# Patient Record
Sex: MALE | Race: BLACK OR AFRICAN AMERICAN | NOT HISPANIC OR LATINO | URBAN - METROPOLITAN AREA
[De-identification: names, ages, dates, MRNs, and addresses within clinical notes are randomized per-mention and may not be internally consistent; named-entity substitution may affect disease eponyms.]

---

## 2021-01-01 ENCOUNTER — INPATIENT (INPATIENT)
Facility: HOSPITAL | Age: 81
LOS: 8 days | DRG: 377 | End: 2021-06-25
Attending: STUDENT IN AN ORGANIZED HEALTH CARE EDUCATION/TRAINING PROGRAM | Admitting: STUDENT IN AN ORGANIZED HEALTH CARE EDUCATION/TRAINING PROGRAM
Payer: MEDICAID

## 2021-01-01 VITALS
TEMPERATURE: 98 F | HEART RATE: 76 BPM | SYSTOLIC BLOOD PRESSURE: 118 MMHG | RESPIRATION RATE: 35 BRPM | OXYGEN SATURATION: 100 % | WEIGHT: 198.64 LBS | DIASTOLIC BLOOD PRESSURE: 76 MMHG | HEIGHT: 71 IN

## 2021-01-01 DIAGNOSIS — E87.2 ACIDOSIS: ICD-10-CM

## 2021-01-01 DIAGNOSIS — Z87.19 PERSONAL HISTORY OF OTHER DISEASES OF THE DIGESTIVE SYSTEM: Chronic | ICD-10-CM

## 2021-01-01 DIAGNOSIS — Z95.828 PRESENCE OF OTHER VASCULAR IMPLANTS AND GRAFTS: Chronic | ICD-10-CM

## 2021-01-01 DIAGNOSIS — K29.00 ACUTE GASTRITIS WITHOUT BLEEDING: ICD-10-CM

## 2021-01-01 DIAGNOSIS — Z98.890 OTHER SPECIFIED POSTPROCEDURAL STATES: Chronic | ICD-10-CM

## 2021-01-01 DIAGNOSIS — Z51.81 ENCOUNTER FOR THERAPEUTIC DRUG LEVEL MONITORING: ICD-10-CM

## 2021-01-01 DIAGNOSIS — R80.9 PROTEINURIA, UNSPECIFIED: ICD-10-CM

## 2021-01-01 DIAGNOSIS — K92.2 GASTROINTESTINAL HEMORRHAGE, UNSPECIFIED: ICD-10-CM

## 2021-01-01 DIAGNOSIS — N17.9 ACUTE KIDNEY FAILURE, UNSPECIFIED: ICD-10-CM

## 2021-01-01 LAB
ALBUMIN SERPL ELPH-MCNC: 1.3 G/DL — LOW (ref 3.3–5)
ALBUMIN SERPL ELPH-MCNC: 1.6 G/DL — LOW (ref 3.3–5)
ALBUMIN SERPL ELPH-MCNC: 1.6 G/DL — LOW (ref 3.3–5)
ALBUMIN SERPL ELPH-MCNC: 1.7 G/DL — LOW (ref 3.3–5)
ALBUMIN SERPL ELPH-MCNC: 1.8 G/DL — LOW (ref 3.3–5)
ALBUMIN SERPL ELPH-MCNC: 1.9 G/DL — LOW (ref 3.3–5)
ALBUMIN SERPL ELPH-MCNC: 2 G/DL — LOW (ref 3.3–5)
ALBUMIN SERPL ELPH-MCNC: 2.2 G/DL — LOW (ref 3.3–5)
ALP SERPL-CCNC: 129 U/L — HIGH (ref 40–120)
ALP SERPL-CCNC: 176 U/L — HIGH (ref 40–120)
ALP SERPL-CCNC: 183 U/L — HIGH (ref 40–120)
ALP SERPL-CCNC: 35 U/L — LOW (ref 40–120)
ALP SERPL-CCNC: 36 U/L — LOW (ref 40–120)
ALP SERPL-CCNC: 38 U/L — LOW (ref 40–120)
ALP SERPL-CCNC: 40 U/L — SIGNIFICANT CHANGE UP (ref 40–120)
ALP SERPL-CCNC: 40 U/L — SIGNIFICANT CHANGE UP (ref 40–120)
ALP SERPL-CCNC: 43 U/L — SIGNIFICANT CHANGE UP (ref 40–120)
ALP SERPL-CCNC: 44 U/L — SIGNIFICANT CHANGE UP (ref 40–120)
ALP SERPL-CCNC: 47 U/L — SIGNIFICANT CHANGE UP (ref 40–120)
ALP SERPL-CCNC: 49 U/L — SIGNIFICANT CHANGE UP (ref 40–120)
ALP SERPL-CCNC: 50 U/L — SIGNIFICANT CHANGE UP (ref 40–120)
ALP SERPL-CCNC: 52 U/L — SIGNIFICANT CHANGE UP (ref 40–120)
ALP SERPL-CCNC: 62 U/L — SIGNIFICANT CHANGE UP (ref 40–120)
ALP SERPL-CCNC: 68 U/L — SIGNIFICANT CHANGE UP (ref 40–120)
ALP SERPL-CCNC: 75 U/L — SIGNIFICANT CHANGE UP (ref 40–120)
ALP SERPL-CCNC: 89 U/L — SIGNIFICANT CHANGE UP (ref 40–120)
ALT FLD-CCNC: 11 U/L — SIGNIFICANT CHANGE UP (ref 10–45)
ALT FLD-CCNC: 14 U/L — SIGNIFICANT CHANGE UP (ref 10–45)
ALT FLD-CCNC: 247 U/L — HIGH (ref 10–45)
ALT FLD-CCNC: 5 U/L — LOW (ref 10–45)
ALT FLD-CCNC: 6 U/L — LOW (ref 10–45)
ALT FLD-CCNC: 6 U/L — LOW (ref 10–45)
ALT FLD-CCNC: 7 U/L — LOW (ref 10–45)
ALT FLD-CCNC: 7 U/L — LOW (ref 10–45)
ALT FLD-CCNC: 8 U/L — LOW (ref 10–45)
ALT FLD-CCNC: 9 U/L — LOW (ref 10–45)
ANION GAP SERPL CALC-SCNC: 11 MMOL/L — SIGNIFICANT CHANGE UP (ref 5–17)
ANION GAP SERPL CALC-SCNC: 12 MMOL/L — SIGNIFICANT CHANGE UP (ref 5–17)
ANION GAP SERPL CALC-SCNC: 13 MMOL/L — SIGNIFICANT CHANGE UP (ref 5–17)
ANION GAP SERPL CALC-SCNC: 13 MMOL/L — SIGNIFICANT CHANGE UP (ref 5–17)
ANION GAP SERPL CALC-SCNC: 14 MMOL/L — SIGNIFICANT CHANGE UP (ref 5–17)
ANION GAP SERPL CALC-SCNC: 15 MMOL/L — SIGNIFICANT CHANGE UP (ref 5–17)
ANION GAP SERPL CALC-SCNC: 16 MMOL/L — SIGNIFICANT CHANGE UP (ref 5–17)
ANION GAP SERPL CALC-SCNC: 17 MMOL/L — SIGNIFICANT CHANGE UP (ref 5–17)
ANION GAP SERPL CALC-SCNC: 18 MMOL/L — HIGH (ref 5–17)
ANION GAP SERPL CALC-SCNC: 19 MMOL/L — HIGH (ref 5–17)
ANION GAP SERPL CALC-SCNC: 19 MMOL/L — HIGH (ref 5–17)
ANION GAP SERPL CALC-SCNC: 21 MMOL/L — HIGH (ref 5–17)
APPEARANCE UR: ABNORMAL
APTT BLD: 30.9 SEC — SIGNIFICANT CHANGE UP (ref 27.5–35.5)
APTT BLD: 31.1 SEC — SIGNIFICANT CHANGE UP (ref 27.5–35.5)
APTT BLD: 34.4 SEC — SIGNIFICANT CHANGE UP (ref 27.5–35.5)
APTT BLD: 35.3 SEC — SIGNIFICANT CHANGE UP (ref 27.5–35.5)
APTT BLD: 35.9 SEC — HIGH (ref 27.5–35.5)
APTT BLD: 36.3 SEC — HIGH (ref 27.5–35.5)
APTT BLD: 37.1 SEC — HIGH (ref 27.5–35.5)
APTT BLD: 39.2 SEC — HIGH (ref 27.5–35.5)
APTT BLD: 40 SEC — HIGH (ref 27.5–35.5)
APTT BLD: 41.3 SEC — HIGH (ref 27.5–35.5)
APTT BLD: 42.2 SEC — HIGH (ref 27.5–35.5)
AST SERPL-CCNC: 1038 U/L — HIGH (ref 10–40)
AST SERPL-CCNC: 13 U/L — SIGNIFICANT CHANGE UP (ref 10–40)
AST SERPL-CCNC: 15 U/L — SIGNIFICANT CHANGE UP (ref 10–40)
AST SERPL-CCNC: 16 U/L — SIGNIFICANT CHANGE UP (ref 10–40)
AST SERPL-CCNC: 17 U/L — SIGNIFICANT CHANGE UP (ref 10–40)
AST SERPL-CCNC: 17 U/L — SIGNIFICANT CHANGE UP (ref 10–40)
AST SERPL-CCNC: 18 U/L — SIGNIFICANT CHANGE UP (ref 10–40)
AST SERPL-CCNC: 20 U/L — SIGNIFICANT CHANGE UP (ref 10–40)
AST SERPL-CCNC: 22 U/L — SIGNIFICANT CHANGE UP (ref 10–40)
AST SERPL-CCNC: 22 U/L — SIGNIFICANT CHANGE UP (ref 10–40)
AST SERPL-CCNC: 23 U/L — SIGNIFICANT CHANGE UP (ref 10–40)
AST SERPL-CCNC: 25 U/L — SIGNIFICANT CHANGE UP (ref 10–40)
AST SERPL-CCNC: 25 U/L — SIGNIFICANT CHANGE UP (ref 10–40)
AST SERPL-CCNC: 26 U/L — SIGNIFICANT CHANGE UP (ref 10–40)
AST SERPL-CCNC: 26 U/L — SIGNIFICANT CHANGE UP (ref 10–40)
AST SERPL-CCNC: 31 U/L — SIGNIFICANT CHANGE UP (ref 10–40)
BACTERIA # UR AUTO: NEGATIVE — SIGNIFICANT CHANGE UP
BASE EXCESS BLDV CALC-SCNC: -11.3 MMOL/L — LOW (ref -2–2)
BASOPHILS # BLD AUTO: 0.01 K/UL — SIGNIFICANT CHANGE UP (ref 0–0.2)
BASOPHILS # BLD AUTO: 0.02 K/UL — SIGNIFICANT CHANGE UP (ref 0–0.2)
BASOPHILS NFR BLD AUTO: 0.1 % — SIGNIFICANT CHANGE UP (ref 0–2)
BASOPHILS NFR BLD AUTO: 0.2 % — SIGNIFICANT CHANGE UP (ref 0–2)
BILIRUB SERPL-MCNC: 0.2 MG/DL — SIGNIFICANT CHANGE UP (ref 0.2–1.2)
BILIRUB SERPL-MCNC: 0.3 MG/DL — SIGNIFICANT CHANGE UP (ref 0.2–1.2)
BILIRUB SERPL-MCNC: 0.4 MG/DL — SIGNIFICANT CHANGE UP (ref 0.2–1.2)
BILIRUB SERPL-MCNC: 0.5 MG/DL — SIGNIFICANT CHANGE UP (ref 0.2–1.2)
BILIRUB SERPL-MCNC: 0.6 MG/DL — SIGNIFICANT CHANGE UP (ref 0.2–1.2)
BILIRUB SERPL-MCNC: 0.6 MG/DL — SIGNIFICANT CHANGE UP (ref 0.2–1.2)
BILIRUB SERPL-MCNC: 0.7 MG/DL — SIGNIFICANT CHANGE UP (ref 0.2–1.2)
BILIRUB SERPL-MCNC: 2 MG/DL — HIGH (ref 0.2–1.2)
BILIRUB UR-MCNC: NEGATIVE — SIGNIFICANT CHANGE UP
BLD GP AB SCN SERPL QL: NEGATIVE — SIGNIFICANT CHANGE UP
BUN SERPL-MCNC: 17 MG/DL — SIGNIFICANT CHANGE UP (ref 7–23)
BUN SERPL-MCNC: 18 MG/DL — SIGNIFICANT CHANGE UP (ref 7–23)
BUN SERPL-MCNC: 20 MG/DL — SIGNIFICANT CHANGE UP (ref 7–23)
BUN SERPL-MCNC: 22 MG/DL — SIGNIFICANT CHANGE UP (ref 7–23)
BUN SERPL-MCNC: 24 MG/DL — HIGH (ref 7–23)
BUN SERPL-MCNC: 27 MG/DL — HIGH (ref 7–23)
BUN SERPL-MCNC: 28 MG/DL — HIGH (ref 7–23)
BUN SERPL-MCNC: 28 MG/DL — HIGH (ref 7–23)
BUN SERPL-MCNC: 30 MG/DL — HIGH (ref 7–23)
BUN SERPL-MCNC: 31 MG/DL — HIGH (ref 7–23)
BUN SERPL-MCNC: 37 MG/DL — HIGH (ref 7–23)
BUN SERPL-MCNC: 41 MG/DL — HIGH (ref 7–23)
BUN SERPL-MCNC: 46 MG/DL — HIGH (ref 7–23)
BUN SERPL-MCNC: 48 MG/DL — HIGH (ref 7–23)
BUN SERPL-MCNC: 49 MG/DL — HIGH (ref 7–23)
BUN SERPL-MCNC: 50 MG/DL — HIGH (ref 7–23)
BUN SERPL-MCNC: 50 MG/DL — HIGH (ref 7–23)
BUN SERPL-MCNC: 52 MG/DL — HIGH (ref 7–23)
BUN SERPL-MCNC: 52 MG/DL — HIGH (ref 7–23)
BUN SERPL-MCNC: 53 MG/DL — HIGH (ref 7–23)
BUN SERPL-MCNC: 55 MG/DL — HIGH (ref 7–23)
BUN SERPL-MCNC: 55 MG/DL — HIGH (ref 7–23)
BUN SERPL-MCNC: 56 MG/DL — HIGH (ref 7–23)
BUN SERPL-MCNC: 57 MG/DL — HIGH (ref 7–23)
BUN SERPL-MCNC: 59 MG/DL — HIGH (ref 7–23)
BUN SERPL-MCNC: 59 MG/DL — HIGH (ref 7–23)
BUN SERPL-MCNC: 60 MG/DL — HIGH (ref 7–23)
BUN SERPL-MCNC: 60 MG/DL — HIGH (ref 7–23)
BUN SERPL-MCNC: 62 MG/DL — HIGH (ref 7–23)
BUN SERPL-MCNC: 63 MG/DL — HIGH (ref 7–23)
BUN SERPL-MCNC: 63 MG/DL — HIGH (ref 7–23)
BUN SERPL-MCNC: 65 MG/DL — HIGH (ref 7–23)
BUN SERPL-MCNC: 67 MG/DL — HIGH (ref 7–23)
BUN SERPL-MCNC: 67 MG/DL — HIGH (ref 7–23)
CA-I SERPL-SCNC: 1.08 MMOL/L — LOW (ref 1.12–1.3)
CALCIUM SERPL-MCNC: 4.8 MG/DL — CRITICAL LOW (ref 8.4–10.5)
CALCIUM SERPL-MCNC: 6.8 MG/DL — LOW (ref 8.4–10.5)
CALCIUM SERPL-MCNC: 6.8 MG/DL — LOW (ref 8.4–10.5)
CALCIUM SERPL-MCNC: 6.9 MG/DL — LOW (ref 8.4–10.5)
CALCIUM SERPL-MCNC: 6.9 MG/DL — LOW (ref 8.4–10.5)
CALCIUM SERPL-MCNC: 7 MG/DL — LOW (ref 8.4–10.5)
CALCIUM SERPL-MCNC: 7.1 MG/DL — LOW (ref 8.4–10.5)
CALCIUM SERPL-MCNC: 7.2 MG/DL — LOW (ref 8.4–10.5)
CALCIUM SERPL-MCNC: 7.3 MG/DL — LOW (ref 8.4–10.5)
CALCIUM SERPL-MCNC: 7.4 MG/DL — LOW (ref 8.4–10.5)
CALCIUM SERPL-MCNC: 7.5 MG/DL — LOW (ref 8.4–10.5)
CALCIUM SERPL-MCNC: 7.5 MG/DL — LOW (ref 8.4–10.5)
CALCIUM SERPL-MCNC: 7.9 MG/DL — LOW (ref 8.4–10.5)
CHLORIDE BLDV-SCNC: >120 MMOL/L — HIGH (ref 96–108)
CHLORIDE SERPL-SCNC: 105 MMOL/L — SIGNIFICANT CHANGE UP (ref 96–108)
CHLORIDE SERPL-SCNC: 107 MMOL/L — SIGNIFICANT CHANGE UP (ref 96–108)
CHLORIDE SERPL-SCNC: 108 MMOL/L — SIGNIFICANT CHANGE UP (ref 96–108)
CHLORIDE SERPL-SCNC: 109 MMOL/L — HIGH (ref 96–108)
CHLORIDE SERPL-SCNC: 109 MMOL/L — HIGH (ref 96–108)
CHLORIDE SERPL-SCNC: 110 MMOL/L — HIGH (ref 96–108)
CHLORIDE SERPL-SCNC: 110 MMOL/L — HIGH (ref 96–108)
CHLORIDE SERPL-SCNC: 111 MMOL/L — HIGH (ref 96–108)
CHLORIDE SERPL-SCNC: 112 MMOL/L — HIGH (ref 96–108)
CHLORIDE SERPL-SCNC: 112 MMOL/L — HIGH (ref 96–108)
CHLORIDE SERPL-SCNC: 113 MMOL/L — HIGH (ref 96–108)
CHLORIDE SERPL-SCNC: 115 MMOL/L — HIGH (ref 96–108)
CHLORIDE SERPL-SCNC: 116 MMOL/L — HIGH (ref 96–108)
CHLORIDE SERPL-SCNC: 117 MMOL/L — HIGH (ref 96–108)
CHLORIDE SERPL-SCNC: 118 MMOL/L — HIGH (ref 96–108)
CHLORIDE SERPL-SCNC: 118 MMOL/L — HIGH (ref 96–108)
CHLORIDE SERPL-SCNC: 119 MMOL/L — HIGH (ref 96–108)
CHLORIDE SERPL-SCNC: 120 MMOL/L — HIGH (ref 96–108)
CHLORIDE SERPL-SCNC: 121 MMOL/L — HIGH (ref 96–108)
CHLORIDE SERPL-SCNC: 129 MMOL/L — HIGH (ref 96–108)
CO2 BLDV-SCNC: 17 MMOL/L — LOW (ref 22–30)
CO2 SERPL-SCNC: 10 MMOL/L — CRITICAL LOW (ref 22–31)
CO2 SERPL-SCNC: 12 MMOL/L — LOW (ref 22–31)
CO2 SERPL-SCNC: 13 MMOL/L — LOW (ref 22–31)
CO2 SERPL-SCNC: 14 MMOL/L — LOW (ref 22–31)
CO2 SERPL-SCNC: 15 MMOL/L — LOW (ref 22–31)
CO2 SERPL-SCNC: 16 MMOL/L — LOW (ref 22–31)
CO2 SERPL-SCNC: 16 MMOL/L — LOW (ref 22–31)
CO2 SERPL-SCNC: 17 MMOL/L — LOW (ref 22–31)
CO2 SERPL-SCNC: 18 MMOL/L — LOW (ref 22–31)
CO2 SERPL-SCNC: 19 MMOL/L — LOW (ref 22–31)
CO2 SERPL-SCNC: 19 MMOL/L — LOW (ref 22–31)
COLOR SPEC: YELLOW — SIGNIFICANT CHANGE UP
COMMENT - URINE: SIGNIFICANT CHANGE UP
COMMENT - URINE: SIGNIFICANT CHANGE UP
COVID-19 SPIKE DOMAIN AB INTERP: POSITIVE
COVID-19 SPIKE DOMAIN ANTIBODY RESULT: 69.7 U/ML — HIGH
CREAT ?TM UR-MCNC: 55 MG/DL — SIGNIFICANT CHANGE UP
CREAT SERPL-MCNC: 1.4 MG/DL — HIGH (ref 0.5–1.3)
CREAT SERPL-MCNC: 1.44 MG/DL — HIGH (ref 0.5–1.3)
CREAT SERPL-MCNC: 1.45 MG/DL — HIGH (ref 0.5–1.3)
CREAT SERPL-MCNC: 1.49 MG/DL — HIGH (ref 0.5–1.3)
CREAT SERPL-MCNC: 1.65 MG/DL — HIGH (ref 0.5–1.3)
CREAT SERPL-MCNC: 1.75 MG/DL — HIGH (ref 0.5–1.3)
CREAT SERPL-MCNC: 1.84 MG/DL — HIGH (ref 0.5–1.3)
CREAT SERPL-MCNC: 1.86 MG/DL — HIGH (ref 0.5–1.3)
CREAT SERPL-MCNC: 1.92 MG/DL — HIGH (ref 0.5–1.3)
CREAT SERPL-MCNC: 1.96 MG/DL — HIGH (ref 0.5–1.3)
CREAT SERPL-MCNC: 2.27 MG/DL — HIGH (ref 0.5–1.3)
CREAT SERPL-MCNC: 2.61 MG/DL — HIGH (ref 0.5–1.3)
CREAT SERPL-MCNC: 2.65 MG/DL — HIGH (ref 0.5–1.3)
CREAT SERPL-MCNC: 2.71 MG/DL — HIGH (ref 0.5–1.3)
CREAT SERPL-MCNC: 2.72 MG/DL — HIGH (ref 0.5–1.3)
CREAT SERPL-MCNC: 2.77 MG/DL — HIGH (ref 0.5–1.3)
CREAT SERPL-MCNC: 2.79 MG/DL — HIGH (ref 0.5–1.3)
CREAT SERPL-MCNC: 2.8 MG/DL — HIGH (ref 0.5–1.3)
CREAT SERPL-MCNC: 2.82 MG/DL — HIGH (ref 0.5–1.3)
CREAT SERPL-MCNC: 2.92 MG/DL — HIGH (ref 0.5–1.3)
CREAT SERPL-MCNC: 2.93 MG/DL — HIGH (ref 0.5–1.3)
CREAT SERPL-MCNC: 3.01 MG/DL — HIGH (ref 0.5–1.3)
CREAT SERPL-MCNC: 3.17 MG/DL — HIGH (ref 0.5–1.3)
CREAT SERPL-MCNC: 3.23 MG/DL — HIGH (ref 0.5–1.3)
CREAT SERPL-MCNC: 3.29 MG/DL — HIGH (ref 0.5–1.3)
CREAT SERPL-MCNC: 3.33 MG/DL — HIGH (ref 0.5–1.3)
CREAT SERPL-MCNC: 3.36 MG/DL — HIGH (ref 0.5–1.3)
CREAT SERPL-MCNC: 3.5 MG/DL — HIGH (ref 0.5–1.3)
CREAT SERPL-MCNC: 3.51 MG/DL — HIGH (ref 0.5–1.3)
CREAT SERPL-MCNC: 3.58 MG/DL — HIGH (ref 0.5–1.3)
CREAT SERPL-MCNC: 3.85 MG/DL — HIGH (ref 0.5–1.3)
CREAT SERPL-MCNC: 3.86 MG/DL — HIGH (ref 0.5–1.3)
CREAT SERPL-MCNC: 3.98 MG/DL — HIGH (ref 0.5–1.3)
CREAT SERPL-MCNC: 4.07 MG/DL — HIGH (ref 0.5–1.3)
CULTURE RESULTS: SIGNIFICANT CHANGE UP
DIFF PNL FLD: ABNORMAL
EOSINOPHIL # BLD AUTO: 0.01 K/UL — SIGNIFICANT CHANGE UP (ref 0–0.5)
EOSINOPHIL # BLD AUTO: 0.1 K/UL — SIGNIFICANT CHANGE UP (ref 0–0.5)
EOSINOPHIL NFR BLD AUTO: 0 % — SIGNIFICANT CHANGE UP (ref 0–6)
EOSINOPHIL NFR BLD AUTO: 1.8 % — SIGNIFICANT CHANGE UP (ref 0–6)
EPI CELLS # UR: 6 — HIGH
FIBRINOGEN PPP-MCNC: 281 MG/DL — LOW (ref 290–520)
GAS PNL BLDA: SIGNIFICANT CHANGE UP
GAS PNL BLDV: 143 MMOL/L — SIGNIFICANT CHANGE UP (ref 135–145)
GAS PNL BLDV: SIGNIFICANT CHANGE UP
GASTRIN SERPL-MCNC: 54 PG/ML — SIGNIFICANT CHANGE UP (ref 0–115)
GLUCOSE BLDC GLUCOMTR-MCNC: 100 MG/DL — HIGH (ref 70–99)
GLUCOSE BLDC GLUCOMTR-MCNC: 101 MG/DL — HIGH (ref 70–99)
GLUCOSE BLDC GLUCOMTR-MCNC: 101 MG/DL — HIGH (ref 70–99)
GLUCOSE BLDC GLUCOMTR-MCNC: 109 MG/DL — HIGH (ref 70–99)
GLUCOSE BLDC GLUCOMTR-MCNC: 113 MG/DL — HIGH (ref 70–99)
GLUCOSE BLDC GLUCOMTR-MCNC: 115 MG/DL — HIGH (ref 70–99)
GLUCOSE BLDC GLUCOMTR-MCNC: 123 MG/DL — HIGH (ref 70–99)
GLUCOSE BLDC GLUCOMTR-MCNC: 129 MG/DL — HIGH (ref 70–99)
GLUCOSE BLDC GLUCOMTR-MCNC: 133 MG/DL — HIGH (ref 70–99)
GLUCOSE BLDC GLUCOMTR-MCNC: 145 MG/DL — HIGH (ref 70–99)
GLUCOSE BLDC GLUCOMTR-MCNC: 154 MG/DL — HIGH (ref 70–99)
GLUCOSE BLDC GLUCOMTR-MCNC: 156 MG/DL — HIGH (ref 70–99)
GLUCOSE BLDC GLUCOMTR-MCNC: 158 MG/DL — HIGH (ref 70–99)
GLUCOSE BLDC GLUCOMTR-MCNC: 16 MG/DL — CRITICAL LOW (ref 70–99)
GLUCOSE BLDC GLUCOMTR-MCNC: 163 MG/DL — HIGH (ref 70–99)
GLUCOSE BLDC GLUCOMTR-MCNC: 165 MG/DL — HIGH (ref 70–99)
GLUCOSE BLDC GLUCOMTR-MCNC: 167 MG/DL — HIGH (ref 70–99)
GLUCOSE BLDC GLUCOMTR-MCNC: 22 MG/DL — CRITICAL LOW (ref 70–99)
GLUCOSE BLDC GLUCOMTR-MCNC: 45 MG/DL — CRITICAL LOW (ref 70–99)
GLUCOSE BLDC GLUCOMTR-MCNC: 57 MG/DL — LOW (ref 70–99)
GLUCOSE BLDC GLUCOMTR-MCNC: 80 MG/DL — SIGNIFICANT CHANGE UP (ref 70–99)
GLUCOSE BLDC GLUCOMTR-MCNC: 82 MG/DL — SIGNIFICANT CHANGE UP (ref 70–99)
GLUCOSE BLDC GLUCOMTR-MCNC: 87 MG/DL — SIGNIFICANT CHANGE UP (ref 70–99)
GLUCOSE BLDC GLUCOMTR-MCNC: 88 MG/DL — SIGNIFICANT CHANGE UP (ref 70–99)
GLUCOSE BLDC GLUCOMTR-MCNC: 88 MG/DL — SIGNIFICANT CHANGE UP (ref 70–99)
GLUCOSE BLDC GLUCOMTR-MCNC: 90 MG/DL — SIGNIFICANT CHANGE UP (ref 70–99)
GLUCOSE BLDC GLUCOMTR-MCNC: 91 MG/DL — SIGNIFICANT CHANGE UP (ref 70–99)
GLUCOSE BLDC GLUCOMTR-MCNC: 91 MG/DL — SIGNIFICANT CHANGE UP (ref 70–99)
GLUCOSE BLDC GLUCOMTR-MCNC: 93 MG/DL — SIGNIFICANT CHANGE UP (ref 70–99)
GLUCOSE BLDC GLUCOMTR-MCNC: 96 MG/DL — SIGNIFICANT CHANGE UP (ref 70–99)
GLUCOSE BLDC GLUCOMTR-MCNC: 97 MG/DL — SIGNIFICANT CHANGE UP (ref 70–99)
GLUCOSE BLDC GLUCOMTR-MCNC: 98 MG/DL — SIGNIFICANT CHANGE UP (ref 70–99)
GLUCOSE BLDC GLUCOMTR-MCNC: 98 MG/DL — SIGNIFICANT CHANGE UP (ref 70–99)
GLUCOSE BLDV-MCNC: 125 MG/DL — HIGH (ref 70–99)
GLUCOSE SERPL-MCNC: 102 MG/DL — HIGH (ref 70–99)
GLUCOSE SERPL-MCNC: 105 MG/DL — HIGH (ref 70–99)
GLUCOSE SERPL-MCNC: 111 MG/DL — HIGH (ref 70–99)
GLUCOSE SERPL-MCNC: 113 MG/DL — HIGH (ref 70–99)
GLUCOSE SERPL-MCNC: 115 MG/DL — HIGH (ref 70–99)
GLUCOSE SERPL-MCNC: 127 MG/DL — HIGH (ref 70–99)
GLUCOSE SERPL-MCNC: 128 MG/DL — HIGH (ref 70–99)
GLUCOSE SERPL-MCNC: 130 MG/DL — HIGH (ref 70–99)
GLUCOSE SERPL-MCNC: 130 MG/DL — HIGH (ref 70–99)
GLUCOSE SERPL-MCNC: 135 MG/DL — HIGH (ref 70–99)
GLUCOSE SERPL-MCNC: 143 MG/DL — HIGH (ref 70–99)
GLUCOSE SERPL-MCNC: 146 MG/DL — HIGH (ref 70–99)
GLUCOSE SERPL-MCNC: 146 MG/DL — HIGH (ref 70–99)
GLUCOSE SERPL-MCNC: 149 MG/DL — HIGH (ref 70–99)
GLUCOSE SERPL-MCNC: 150 MG/DL — HIGH (ref 70–99)
GLUCOSE SERPL-MCNC: 151 MG/DL — HIGH (ref 70–99)
GLUCOSE SERPL-MCNC: 163 MG/DL — HIGH (ref 70–99)
GLUCOSE SERPL-MCNC: 166 MG/DL — HIGH (ref 70–99)
GLUCOSE SERPL-MCNC: 172 MG/DL — HIGH (ref 70–99)
GLUCOSE SERPL-MCNC: 180 MG/DL — HIGH (ref 70–99)
GLUCOSE SERPL-MCNC: 181 MG/DL — HIGH (ref 70–99)
GLUCOSE SERPL-MCNC: 193 MG/DL — HIGH (ref 70–99)
GLUCOSE SERPL-MCNC: 193 MG/DL — HIGH (ref 70–99)
GLUCOSE SERPL-MCNC: 217 MG/DL — HIGH (ref 70–99)
GLUCOSE SERPL-MCNC: 234 MG/DL — HIGH (ref 70–99)
GLUCOSE SERPL-MCNC: 78 MG/DL — SIGNIFICANT CHANGE UP (ref 70–99)
GLUCOSE SERPL-MCNC: 86 MG/DL — SIGNIFICANT CHANGE UP (ref 70–99)
GLUCOSE SERPL-MCNC: 89 MG/DL — SIGNIFICANT CHANGE UP (ref 70–99)
GLUCOSE SERPL-MCNC: 93 MG/DL — SIGNIFICANT CHANGE UP (ref 70–99)
GLUCOSE SERPL-MCNC: 93 MG/DL — SIGNIFICANT CHANGE UP (ref 70–99)
GLUCOSE SERPL-MCNC: 95 MG/DL — SIGNIFICANT CHANGE UP (ref 70–99)
GLUCOSE SERPL-MCNC: 96 MG/DL — SIGNIFICANT CHANGE UP (ref 70–99)
GLUCOSE SERPL-MCNC: 98 MG/DL — SIGNIFICANT CHANGE UP (ref 70–99)
GLUCOSE SERPL-MCNC: 99 MG/DL — SIGNIFICANT CHANGE UP (ref 70–99)
GLUCOSE UR QL: NEGATIVE — SIGNIFICANT CHANGE UP
GRAM STN FLD: SIGNIFICANT CHANGE UP
H PYLORI AB SER-ACNC: 9 UNITS — SIGNIFICANT CHANGE UP
HCO3 BLDV-SCNC: 15 MMOL/L — LOW (ref 21–29)
HCT VFR BLD CALC: 19.7 % — CRITICAL LOW (ref 39–50)
HCT VFR BLD CALC: 20.6 % — CRITICAL LOW (ref 39–50)
HCT VFR BLD CALC: 20.8 % — CRITICAL LOW (ref 39–50)
HCT VFR BLD CALC: 20.8 % — CRITICAL LOW (ref 39–50)
HCT VFR BLD CALC: 21 % — CRITICAL LOW (ref 39–50)
HCT VFR BLD CALC: 21.4 % — LOW (ref 39–50)
HCT VFR BLD CALC: 21.5 % — LOW (ref 39–50)
HCT VFR BLD CALC: 21.6 % — LOW (ref 39–50)
HCT VFR BLD CALC: 21.8 % — LOW (ref 39–50)
HCT VFR BLD CALC: 21.9 % — LOW (ref 39–50)
HCT VFR BLD CALC: 22.1 % — LOW (ref 39–50)
HCT VFR BLD CALC: 22.1 % — LOW (ref 39–50)
HCT VFR BLD CALC: 22.3 % — LOW (ref 39–50)
HCT VFR BLD CALC: 22.5 % — LOW (ref 39–50)
HCT VFR BLD CALC: 22.5 % — LOW (ref 39–50)
HCT VFR BLD CALC: 22.7 % — LOW (ref 39–50)
HCT VFR BLD CALC: 22.9 % — LOW (ref 39–50)
HCT VFR BLD CALC: 23.2 % — LOW (ref 39–50)
HCT VFR BLD CALC: 23.5 % — LOW (ref 39–50)
HCT VFR BLD CALC: 23.7 % — LOW (ref 39–50)
HCT VFR BLD CALC: 23.7 % — LOW (ref 39–50)
HCT VFR BLD CALC: 23.8 % — LOW (ref 39–50)
HCT VFR BLD CALC: 24.1 % — LOW (ref 39–50)
HCT VFR BLD CALC: 24.3 % — LOW (ref 39–50)
HCT VFR BLD CALC: 24.4 % — LOW (ref 39–50)
HCT VFR BLD CALC: 25.3 % — LOW (ref 39–50)
HCT VFR BLD CALC: 25.6 % — LOW (ref 39–50)
HCT VFR BLD CALC: 25.7 % — LOW (ref 39–50)
HCT VFR BLD CALC: 25.9 % — LOW (ref 39–50)
HCT VFR BLD CALC: 26.5 % — LOW (ref 39–50)
HCT VFR BLD CALC: 26.7 % — LOW (ref 39–50)
HCT VFR BLD CALC: 26.8 % — LOW (ref 39–50)
HCT VFR BLD CALC: 26.9 % — LOW (ref 39–50)
HCT VFR BLD CALC: 27 % — LOW (ref 39–50)
HCT VFR BLD CALC: 28.4 % — LOW (ref 39–50)
HCT VFR BLD CALC: 29.3 % — LOW (ref 39–50)
HCT VFR BLD CALC: 30 % — LOW (ref 39–50)
HCT VFR BLD CALC: 31.3 % — LOW (ref 39–50)
HCT VFR BLDA CALC: 26 % — LOW (ref 39–50)
HEPARINASE TEG R TIME: 4.5 MIN — SIGNIFICANT CHANGE UP (ref 4.3–8.3)
HGB BLD CALC-MCNC: 8.2 G/DL — LOW (ref 13–17)
HGB BLD-MCNC: 10 G/DL — LOW (ref 13–17)
HGB BLD-MCNC: 6.6 G/DL — CRITICAL LOW (ref 13–17)
HGB BLD-MCNC: 6.7 G/DL — CRITICAL LOW (ref 13–17)
HGB BLD-MCNC: 6.9 G/DL — CRITICAL LOW (ref 13–17)
HGB BLD-MCNC: 7 G/DL — CRITICAL LOW (ref 13–17)
HGB BLD-MCNC: 7.1 G/DL — LOW (ref 13–17)
HGB BLD-MCNC: 7.1 G/DL — LOW (ref 13–17)
HGB BLD-MCNC: 7.2 G/DL — LOW (ref 13–17)
HGB BLD-MCNC: 7.3 G/DL — LOW (ref 13–17)
HGB BLD-MCNC: 7.4 G/DL — LOW (ref 13–17)
HGB BLD-MCNC: 7.5 G/DL — LOW (ref 13–17)
HGB BLD-MCNC: 7.5 G/DL — LOW (ref 13–17)
HGB BLD-MCNC: 7.6 G/DL — LOW (ref 13–17)
HGB BLD-MCNC: 7.7 G/DL — LOW (ref 13–17)
HGB BLD-MCNC: 7.7 G/DL — LOW (ref 13–17)
HGB BLD-MCNC: 7.8 G/DL — LOW (ref 13–17)
HGB BLD-MCNC: 7.9 G/DL — LOW (ref 13–17)
HGB BLD-MCNC: 8 G/DL — LOW (ref 13–17)
HGB BLD-MCNC: 8 G/DL — LOW (ref 13–17)
HGB BLD-MCNC: 8.2 G/DL — LOW (ref 13–17)
HGB BLD-MCNC: 8.2 G/DL — LOW (ref 13–17)
HGB BLD-MCNC: 8.4 G/DL — LOW (ref 13–17)
HGB BLD-MCNC: 8.4 G/DL — LOW (ref 13–17)
HGB BLD-MCNC: 8.5 G/DL — LOW (ref 13–17)
HGB BLD-MCNC: 8.5 G/DL — LOW (ref 13–17)
HGB BLD-MCNC: 8.8 G/DL — LOW (ref 13–17)
HGB BLD-MCNC: 9 G/DL — LOW (ref 13–17)
HGB BLD-MCNC: 9.3 G/DL — LOW (ref 13–17)
HGB BLD-MCNC: 9.5 G/DL — LOW (ref 13–17)
HGB BLD-MCNC: 9.7 G/DL — LOW (ref 13–17)
HYALINE CASTS # UR AUTO: 11 /LPF — HIGH (ref 0–7)
IMM GRANULOCYTES NFR BLD AUTO: 0.7 % — SIGNIFICANT CHANGE UP (ref 0–1.5)
IMM GRANULOCYTES NFR BLD AUTO: 0.8 % — SIGNIFICANT CHANGE UP (ref 0–1.5)
INR BLD: 1.14 RATIO — SIGNIFICANT CHANGE UP (ref 0.88–1.16)
INR BLD: 1.23 RATIO — HIGH (ref 0.88–1.16)
INR BLD: 1.31 RATIO — HIGH (ref 0.88–1.16)
INR BLD: 1.31 RATIO — HIGH (ref 0.88–1.16)
INR BLD: 1.33 RATIO — HIGH (ref 0.88–1.16)
INR BLD: 1.36 RATIO — HIGH (ref 0.88–1.16)
INR BLD: 1.38 RATIO — HIGH (ref 0.88–1.16)
INR BLD: 1.39 RATIO — HIGH (ref 0.88–1.16)
INR BLD: 1.41 RATIO — HIGH (ref 0.88–1.16)
INR BLD: 1.43 RATIO — HIGH (ref 0.88–1.16)
INR BLD: 1.94 RATIO — HIGH (ref 0.88–1.16)
KETONES UR-MCNC: NEGATIVE — SIGNIFICANT CHANGE UP
LACTATE BLDV-MCNC: 0.8 MMOL/L — SIGNIFICANT CHANGE UP (ref 0.7–2)
LEUKOCYTE ESTERASE UR-ACNC: ABNORMAL
LYMPHOCYTES # BLD AUTO: 0.59 K/UL — LOW (ref 1–3.3)
LYMPHOCYTES # BLD AUTO: 0.82 K/UL — LOW (ref 1–3.3)
LYMPHOCYTES # BLD AUTO: 15.1 % — SIGNIFICANT CHANGE UP (ref 13–44)
LYMPHOCYTES # BLD AUTO: 2.7 % — LOW (ref 13–44)
MAGNESIUM SERPL-MCNC: 1.2 MG/DL — LOW (ref 1.6–2.6)
MAGNESIUM SERPL-MCNC: 1.6 MG/DL — SIGNIFICANT CHANGE UP (ref 1.6–2.6)
MAGNESIUM SERPL-MCNC: 1.7 MG/DL — SIGNIFICANT CHANGE UP (ref 1.6–2.6)
MAGNESIUM SERPL-MCNC: 1.8 MG/DL — SIGNIFICANT CHANGE UP (ref 1.6–2.6)
MAGNESIUM SERPL-MCNC: 1.9 MG/DL — SIGNIFICANT CHANGE UP (ref 1.6–2.6)
MAGNESIUM SERPL-MCNC: 2 MG/DL — SIGNIFICANT CHANGE UP (ref 1.6–2.6)
MAGNESIUM SERPL-MCNC: 2.1 MG/DL — SIGNIFICANT CHANGE UP (ref 1.6–2.6)
MAGNESIUM SERPL-MCNC: 2.1 MG/DL — SIGNIFICANT CHANGE UP (ref 1.6–2.6)
MAGNESIUM SERPL-MCNC: 2.2 MG/DL — SIGNIFICANT CHANGE UP (ref 1.6–2.6)
MAGNESIUM SERPL-MCNC: 2.3 MG/DL — SIGNIFICANT CHANGE UP (ref 1.6–2.6)
MCHC RBC-ENTMCNC: 27 PG — SIGNIFICANT CHANGE UP (ref 27–34)
MCHC RBC-ENTMCNC: 27 PG — SIGNIFICANT CHANGE UP (ref 27–34)
MCHC RBC-ENTMCNC: 27.1 PG — SIGNIFICANT CHANGE UP (ref 27–34)
MCHC RBC-ENTMCNC: 27.4 PG — SIGNIFICANT CHANGE UP (ref 27–34)
MCHC RBC-ENTMCNC: 27.4 PG — SIGNIFICANT CHANGE UP (ref 27–34)
MCHC RBC-ENTMCNC: 27.7 PG — SIGNIFICANT CHANGE UP (ref 27–34)
MCHC RBC-ENTMCNC: 28 PG — SIGNIFICANT CHANGE UP (ref 27–34)
MCHC RBC-ENTMCNC: 28.8 PG — SIGNIFICANT CHANGE UP (ref 27–34)
MCHC RBC-ENTMCNC: 28.9 PG — SIGNIFICANT CHANGE UP (ref 27–34)
MCHC RBC-ENTMCNC: 29.1 PG — SIGNIFICANT CHANGE UP (ref 27–34)
MCHC RBC-ENTMCNC: 29.2 PG — SIGNIFICANT CHANGE UP (ref 27–34)
MCHC RBC-ENTMCNC: 29.3 PG — SIGNIFICANT CHANGE UP (ref 27–34)
MCHC RBC-ENTMCNC: 29.4 PG — SIGNIFICANT CHANGE UP (ref 27–34)
MCHC RBC-ENTMCNC: 29.5 PG — SIGNIFICANT CHANGE UP (ref 27–34)
MCHC RBC-ENTMCNC: 29.6 PG — SIGNIFICANT CHANGE UP (ref 27–34)
MCHC RBC-ENTMCNC: 29.7 PG — SIGNIFICANT CHANGE UP (ref 27–34)
MCHC RBC-ENTMCNC: 29.7 PG — SIGNIFICANT CHANGE UP (ref 27–34)
MCHC RBC-ENTMCNC: 29.8 PG — SIGNIFICANT CHANGE UP (ref 27–34)
MCHC RBC-ENTMCNC: 30 PG — SIGNIFICANT CHANGE UP (ref 27–34)
MCHC RBC-ENTMCNC: 30.1 PG — SIGNIFICANT CHANGE UP (ref 27–34)
MCHC RBC-ENTMCNC: 30.2 PG — SIGNIFICANT CHANGE UP (ref 27–34)
MCHC RBC-ENTMCNC: 30.2 PG — SIGNIFICANT CHANGE UP (ref 27–34)
MCHC RBC-ENTMCNC: 30.5 PG — SIGNIFICANT CHANGE UP (ref 27–34)
MCHC RBC-ENTMCNC: 30.5 PG — SIGNIFICANT CHANGE UP (ref 27–34)
MCHC RBC-ENTMCNC: 30.6 PG — SIGNIFICANT CHANGE UP (ref 27–34)
MCHC RBC-ENTMCNC: 30.6 PG — SIGNIFICANT CHANGE UP (ref 27–34)
MCHC RBC-ENTMCNC: 30.7 GM/DL — LOW (ref 32–36)
MCHC RBC-ENTMCNC: 30.9 GM/DL — LOW (ref 32–36)
MCHC RBC-ENTMCNC: 31.1 GM/DL — LOW (ref 32–36)
MCHC RBC-ENTMCNC: 31.1 GM/DL — LOW (ref 32–36)
MCHC RBC-ENTMCNC: 31.4 GM/DL — LOW (ref 32–36)
MCHC RBC-ENTMCNC: 31.5 GM/DL — LOW (ref 32–36)
MCHC RBC-ENTMCNC: 31.6 GM/DL — LOW (ref 32–36)
MCHC RBC-ENTMCNC: 31.6 GM/DL — LOW (ref 32–36)
MCHC RBC-ENTMCNC: 31.7 GM/DL — LOW (ref 32–36)
MCHC RBC-ENTMCNC: 31.8 GM/DL — LOW (ref 32–36)
MCHC RBC-ENTMCNC: 31.9 GM/DL — LOW (ref 32–36)
MCHC RBC-ENTMCNC: 32.1 GM/DL — SIGNIFICANT CHANGE UP (ref 32–36)
MCHC RBC-ENTMCNC: 32.1 GM/DL — SIGNIFICANT CHANGE UP (ref 32–36)
MCHC RBC-ENTMCNC: 32.3 GM/DL — SIGNIFICANT CHANGE UP (ref 32–36)
MCHC RBC-ENTMCNC: 32.4 GM/DL — SIGNIFICANT CHANGE UP (ref 32–36)
MCHC RBC-ENTMCNC: 32.6 GM/DL — SIGNIFICANT CHANGE UP (ref 32–36)
MCHC RBC-ENTMCNC: 32.7 GM/DL — SIGNIFICANT CHANGE UP (ref 32–36)
MCHC RBC-ENTMCNC: 32.8 GM/DL — SIGNIFICANT CHANGE UP (ref 32–36)
MCHC RBC-ENTMCNC: 32.9 GM/DL — SIGNIFICANT CHANGE UP (ref 32–36)
MCHC RBC-ENTMCNC: 33.2 GM/DL — SIGNIFICANT CHANGE UP (ref 32–36)
MCHC RBC-ENTMCNC: 33.2 GM/DL — SIGNIFICANT CHANGE UP (ref 32–36)
MCHC RBC-ENTMCNC: 33.3 GM/DL — SIGNIFICANT CHANGE UP (ref 32–36)
MCHC RBC-ENTMCNC: 33.3 GM/DL — SIGNIFICANT CHANGE UP (ref 32–36)
MCHC RBC-ENTMCNC: 33.6 GM/DL — SIGNIFICANT CHANGE UP (ref 32–36)
MCHC RBC-ENTMCNC: 33.9 GM/DL — SIGNIFICANT CHANGE UP (ref 32–36)
MCHC RBC-ENTMCNC: 34 GM/DL — SIGNIFICANT CHANGE UP (ref 32–36)
MCHC RBC-ENTMCNC: 34.1 GM/DL — SIGNIFICANT CHANGE UP (ref 32–36)
MCHC RBC-ENTMCNC: 34.5 GM/DL — SIGNIFICANT CHANGE UP (ref 32–36)
MCHC RBC-ENTMCNC: 34.6 GM/DL — SIGNIFICANT CHANGE UP (ref 32–36)
MCHC RBC-ENTMCNC: 34.9 GM/DL — SIGNIFICANT CHANGE UP (ref 32–36)
MCV RBC AUTO: 85.3 FL — SIGNIFICANT CHANGE UP (ref 80–100)
MCV RBC AUTO: 86.8 FL — SIGNIFICANT CHANGE UP (ref 80–100)
MCV RBC AUTO: 86.9 FL — SIGNIFICANT CHANGE UP (ref 80–100)
MCV RBC AUTO: 87 FL — SIGNIFICANT CHANGE UP (ref 80–100)
MCV RBC AUTO: 87.1 FL — SIGNIFICANT CHANGE UP (ref 80–100)
MCV RBC AUTO: 87.4 FL — SIGNIFICANT CHANGE UP (ref 80–100)
MCV RBC AUTO: 87.9 FL — SIGNIFICANT CHANGE UP (ref 80–100)
MCV RBC AUTO: 88.1 FL — SIGNIFICANT CHANGE UP (ref 80–100)
MCV RBC AUTO: 88.2 FL — SIGNIFICANT CHANGE UP (ref 80–100)
MCV RBC AUTO: 88.3 FL — SIGNIFICANT CHANGE UP (ref 80–100)
MCV RBC AUTO: 88.3 FL — SIGNIFICANT CHANGE UP (ref 80–100)
MCV RBC AUTO: 88.4 FL — SIGNIFICANT CHANGE UP (ref 80–100)
MCV RBC AUTO: 88.4 FL — SIGNIFICANT CHANGE UP (ref 80–100)
MCV RBC AUTO: 88.9 FL — SIGNIFICANT CHANGE UP (ref 80–100)
MCV RBC AUTO: 89 FL — SIGNIFICANT CHANGE UP (ref 80–100)
MCV RBC AUTO: 89 FL — SIGNIFICANT CHANGE UP (ref 80–100)
MCV RBC AUTO: 89.3 FL — SIGNIFICANT CHANGE UP (ref 80–100)
MCV RBC AUTO: 89.6 FL — SIGNIFICANT CHANGE UP (ref 80–100)
MCV RBC AUTO: 89.6 FL — SIGNIFICANT CHANGE UP (ref 80–100)
MCV RBC AUTO: 90.1 FL — SIGNIFICANT CHANGE UP (ref 80–100)
MCV RBC AUTO: 90.2 FL — SIGNIFICANT CHANGE UP (ref 80–100)
MCV RBC AUTO: 90.4 FL — SIGNIFICANT CHANGE UP (ref 80–100)
MCV RBC AUTO: 90.5 FL — SIGNIFICANT CHANGE UP (ref 80–100)
MCV RBC AUTO: 90.9 FL — SIGNIFICANT CHANGE UP (ref 80–100)
MCV RBC AUTO: 90.9 FL — SIGNIFICANT CHANGE UP (ref 80–100)
MCV RBC AUTO: 91 FL — SIGNIFICANT CHANGE UP (ref 80–100)
MCV RBC AUTO: 91 FL — SIGNIFICANT CHANGE UP (ref 80–100)
MCV RBC AUTO: 91.1 FL — SIGNIFICANT CHANGE UP (ref 80–100)
MCV RBC AUTO: 91.2 FL — SIGNIFICANT CHANGE UP (ref 80–100)
MCV RBC AUTO: 91.4 FL — SIGNIFICANT CHANGE UP (ref 80–100)
MCV RBC AUTO: 91.8 FL — SIGNIFICANT CHANGE UP (ref 80–100)
MCV RBC AUTO: 91.9 FL — SIGNIFICANT CHANGE UP (ref 80–100)
MCV RBC AUTO: 92.1 FL — SIGNIFICANT CHANGE UP (ref 80–100)
MCV RBC AUTO: 92.3 FL — SIGNIFICANT CHANGE UP (ref 80–100)
MCV RBC AUTO: 92.4 FL — SIGNIFICANT CHANGE UP (ref 80–100)
MCV RBC AUTO: 93 FL — SIGNIFICANT CHANGE UP (ref 80–100)
MONOCYTES # BLD AUTO: 0.72 K/UL — SIGNIFICANT CHANGE UP (ref 0–0.9)
MONOCYTES # BLD AUTO: 3.85 K/UL — HIGH (ref 0–0.9)
MONOCYTES NFR BLD AUTO: 13.3 % — SIGNIFICANT CHANGE UP (ref 2–14)
MONOCYTES NFR BLD AUTO: 17.6 % — HIGH (ref 2–14)
NEUTROPHILS # BLD AUTO: 17.23 K/UL — HIGH (ref 1.8–7.4)
NEUTROPHILS # BLD AUTO: 3.74 K/UL — SIGNIFICANT CHANGE UP (ref 1.8–7.4)
NEUTROPHILS NFR BLD AUTO: 68.9 % — SIGNIFICANT CHANGE UP (ref 43–77)
NEUTROPHILS NFR BLD AUTO: 78.8 % — HIGH (ref 43–77)
NITRITE UR-MCNC: NEGATIVE — SIGNIFICANT CHANGE UP
NRBC # BLD: 0 /100 WBCS — SIGNIFICANT CHANGE UP (ref 0–0)
NRBC # BLD: 1 /100 WBCS — HIGH (ref 0–0)
NRBC # BLD: 2 /100 WBCS — HIGH (ref 0–0)
NRBC # BLD: 3 /100 WBCS — HIGH (ref 0–0)
NRBC # BLD: 4 /100 WBCS — HIGH (ref 0–0)
NRBC # BLD: 4 /100 WBCS — HIGH (ref 0–0)
NRBC # BLD: 5 /100 WBCS — HIGH (ref 0–0)
NRBC # BLD: 6 /100 WBCS — HIGH (ref 0–0)
OTHER CELLS CSF MANUAL: 9 ML/DL — LOW (ref 18–22)
PCO2 BLDV: 40 MMHG — SIGNIFICANT CHANGE UP (ref 35–50)
PH BLDV: 7.21 — LOW (ref 7.35–7.45)
PH UR: 5.5 — SIGNIFICANT CHANGE UP (ref 5–8)
PHOSPHATE SERPL-MCNC: 1.3 MG/DL — LOW (ref 2.5–4.5)
PHOSPHATE SERPL-MCNC: 1.5 MG/DL — LOW (ref 2.5–4.5)
PHOSPHATE SERPL-MCNC: 1.6 MG/DL — LOW (ref 2.5–4.5)
PHOSPHATE SERPL-MCNC: 2.2 MG/DL — LOW (ref 2.5–4.5)
PHOSPHATE SERPL-MCNC: 2.6 MG/DL — SIGNIFICANT CHANGE UP (ref 2.5–4.5)
PHOSPHATE SERPL-MCNC: 2.6 MG/DL — SIGNIFICANT CHANGE UP (ref 2.5–4.5)
PHOSPHATE SERPL-MCNC: 2.7 MG/DL — SIGNIFICANT CHANGE UP (ref 2.5–4.5)
PHOSPHATE SERPL-MCNC: 2.8 MG/DL — SIGNIFICANT CHANGE UP (ref 2.5–4.5)
PHOSPHATE SERPL-MCNC: 3.2 MG/DL — SIGNIFICANT CHANGE UP (ref 2.5–4.5)
PHOSPHATE SERPL-MCNC: 3.4 MG/DL — SIGNIFICANT CHANGE UP (ref 2.5–4.5)
PHOSPHATE SERPL-MCNC: 3.5 MG/DL — SIGNIFICANT CHANGE UP (ref 2.5–4.5)
PHOSPHATE SERPL-MCNC: 3.6 MG/DL — SIGNIFICANT CHANGE UP (ref 2.5–4.5)
PHOSPHATE SERPL-MCNC: 3.8 MG/DL — SIGNIFICANT CHANGE UP (ref 2.5–4.5)
PHOSPHATE SERPL-MCNC: 3.9 MG/DL — SIGNIFICANT CHANGE UP (ref 2.5–4.5)
PHOSPHATE SERPL-MCNC: 4 MG/DL — SIGNIFICANT CHANGE UP (ref 2.5–4.5)
PHOSPHATE SERPL-MCNC: 4.2 MG/DL — SIGNIFICANT CHANGE UP (ref 2.5–4.5)
PHOSPHATE SERPL-MCNC: 4.3 MG/DL — SIGNIFICANT CHANGE UP (ref 2.5–4.5)
PHOSPHATE SERPL-MCNC: 4.3 MG/DL — SIGNIFICANT CHANGE UP (ref 2.5–4.5)
PHOSPHATE SERPL-MCNC: 5 MG/DL — HIGH (ref 2.5–4.5)
PHOSPHATE SERPL-MCNC: 5.2 MG/DL — HIGH (ref 2.5–4.5)
PHOSPHATE SERPL-MCNC: 5.2 MG/DL — HIGH (ref 2.5–4.5)
PHOSPHATE SERPL-MCNC: 5.5 MG/DL — HIGH (ref 2.5–4.5)
PHOSPHATE SERPL-MCNC: 5.6 MG/DL — HIGH (ref 2.5–4.5)
PHOSPHATE SERPL-MCNC: 5.7 MG/DL — HIGH (ref 2.5–4.5)
PHOSPHATE SERPL-MCNC: 5.7 MG/DL — HIGH (ref 2.5–4.5)
PHOSPHATE SERPL-MCNC: 5.8 MG/DL — HIGH (ref 2.5–4.5)
PHOSPHATE SERPL-MCNC: 5.8 MG/DL — HIGH (ref 2.5–4.5)
PHOSPHATE SERPL-MCNC: 5.9 MG/DL — HIGH (ref 2.5–4.5)
PHOSPHATE SERPL-MCNC: 6 MG/DL — HIGH (ref 2.5–4.5)
PHOSPHATE SERPL-MCNC: 6 MG/DL — HIGH (ref 2.5–4.5)
PLATELET # BLD AUTO: 101 K/UL — LOW (ref 150–400)
PLATELET # BLD AUTO: 30 K/UL — LOW (ref 150–400)
PLATELET # BLD AUTO: 33 K/UL — LOW (ref 150–400)
PLATELET # BLD AUTO: 33 K/UL — LOW (ref 150–400)
PLATELET # BLD AUTO: 34 K/UL — LOW (ref 150–400)
PLATELET # BLD AUTO: 34 K/UL — LOW (ref 150–400)
PLATELET # BLD AUTO: 35 K/UL — LOW (ref 150–400)
PLATELET # BLD AUTO: 37 K/UL — LOW (ref 150–400)
PLATELET # BLD AUTO: 38 K/UL — LOW (ref 150–400)
PLATELET # BLD AUTO: 40 K/UL — LOW (ref 150–400)
PLATELET # BLD AUTO: 41 K/UL — LOW (ref 150–400)
PLATELET # BLD AUTO: 42 K/UL — LOW (ref 150–400)
PLATELET # BLD AUTO: 42 K/UL — LOW (ref 150–400)
PLATELET # BLD AUTO: 46 K/UL — LOW (ref 150–400)
PLATELET # BLD AUTO: 49 K/UL — LOW (ref 150–400)
PLATELET # BLD AUTO: 53 K/UL — LOW (ref 150–400)
PLATELET # BLD AUTO: 58 K/UL — LOW (ref 150–400)
PLATELET # BLD AUTO: 58 K/UL — LOW (ref 150–400)
PLATELET # BLD AUTO: 59 K/UL — LOW (ref 150–400)
PLATELET # BLD AUTO: 61 K/UL — LOW (ref 150–400)
PLATELET # BLD AUTO: 61 K/UL — LOW (ref 150–400)
PLATELET # BLD AUTO: 64 K/UL — LOW (ref 150–400)
PLATELET # BLD AUTO: 64 K/UL — LOW (ref 150–400)
PLATELET # BLD AUTO: 65 K/UL — LOW (ref 150–400)
PLATELET # BLD AUTO: 66 K/UL — LOW (ref 150–400)
PLATELET # BLD AUTO: 69 K/UL — LOW (ref 150–400)
PLATELET # BLD AUTO: 75 K/UL — LOW (ref 150–400)
PLATELET # BLD AUTO: 85 K/UL — LOW (ref 150–400)
PLATELET # BLD AUTO: 86 K/UL — LOW (ref 150–400)
PLATELET # BLD AUTO: 92 K/UL — LOW (ref 150–400)
PLATELET # BLD AUTO: 94 K/UL — LOW (ref 150–400)
PO2 BLDV: 45 MMHG — SIGNIFICANT CHANGE UP (ref 25–45)
POTASSIUM BLDV-SCNC: 4.3 MMOL/L — SIGNIFICANT CHANGE UP (ref 3.5–5.3)
POTASSIUM SERPL-MCNC: 3.2 MMOL/L — LOW (ref 3.5–5.3)
POTASSIUM SERPL-MCNC: 3.2 MMOL/L — LOW (ref 3.5–5.3)
POTASSIUM SERPL-MCNC: 3.3 MMOL/L — LOW (ref 3.5–5.3)
POTASSIUM SERPL-MCNC: 3.5 MMOL/L — SIGNIFICANT CHANGE UP (ref 3.5–5.3)
POTASSIUM SERPL-MCNC: 3.6 MMOL/L — SIGNIFICANT CHANGE UP (ref 3.5–5.3)
POTASSIUM SERPL-MCNC: 3.7 MMOL/L — SIGNIFICANT CHANGE UP (ref 3.5–5.3)
POTASSIUM SERPL-MCNC: 3.9 MMOL/L — SIGNIFICANT CHANGE UP (ref 3.5–5.3)
POTASSIUM SERPL-MCNC: 3.9 MMOL/L — SIGNIFICANT CHANGE UP (ref 3.5–5.3)
POTASSIUM SERPL-MCNC: 4 MMOL/L — SIGNIFICANT CHANGE UP (ref 3.5–5.3)
POTASSIUM SERPL-MCNC: 4.1 MMOL/L — SIGNIFICANT CHANGE UP (ref 3.5–5.3)
POTASSIUM SERPL-MCNC: 4.2 MMOL/L — SIGNIFICANT CHANGE UP (ref 3.5–5.3)
POTASSIUM SERPL-MCNC: 4.3 MMOL/L — SIGNIFICANT CHANGE UP (ref 3.5–5.3)
POTASSIUM SERPL-MCNC: 4.4 MMOL/L — SIGNIFICANT CHANGE UP (ref 3.5–5.3)
POTASSIUM SERPL-MCNC: 4.4 MMOL/L — SIGNIFICANT CHANGE UP (ref 3.5–5.3)
POTASSIUM SERPL-MCNC: 4.5 MMOL/L — SIGNIFICANT CHANGE UP (ref 3.5–5.3)
POTASSIUM SERPL-MCNC: 4.6 MMOL/L — SIGNIFICANT CHANGE UP (ref 3.5–5.3)
POTASSIUM SERPL-MCNC: 4.7 MMOL/L — SIGNIFICANT CHANGE UP (ref 3.5–5.3)
POTASSIUM SERPL-MCNC: 4.7 MMOL/L — SIGNIFICANT CHANGE UP (ref 3.5–5.3)
POTASSIUM SERPL-MCNC: 4.8 MMOL/L — SIGNIFICANT CHANGE UP (ref 3.5–5.3)
POTASSIUM SERPL-SCNC: 3.2 MMOL/L — LOW (ref 3.5–5.3)
POTASSIUM SERPL-SCNC: 3.2 MMOL/L — LOW (ref 3.5–5.3)
POTASSIUM SERPL-SCNC: 3.3 MMOL/L — LOW (ref 3.5–5.3)
POTASSIUM SERPL-SCNC: 3.5 MMOL/L — SIGNIFICANT CHANGE UP (ref 3.5–5.3)
POTASSIUM SERPL-SCNC: 3.6 MMOL/L — SIGNIFICANT CHANGE UP (ref 3.5–5.3)
POTASSIUM SERPL-SCNC: 3.7 MMOL/L — SIGNIFICANT CHANGE UP (ref 3.5–5.3)
POTASSIUM SERPL-SCNC: 3.9 MMOL/L — SIGNIFICANT CHANGE UP (ref 3.5–5.3)
POTASSIUM SERPL-SCNC: 3.9 MMOL/L — SIGNIFICANT CHANGE UP (ref 3.5–5.3)
POTASSIUM SERPL-SCNC: 4 MMOL/L — SIGNIFICANT CHANGE UP (ref 3.5–5.3)
POTASSIUM SERPL-SCNC: 4.1 MMOL/L — SIGNIFICANT CHANGE UP (ref 3.5–5.3)
POTASSIUM SERPL-SCNC: 4.2 MMOL/L — SIGNIFICANT CHANGE UP (ref 3.5–5.3)
POTASSIUM SERPL-SCNC: 4.3 MMOL/L — SIGNIFICANT CHANGE UP (ref 3.5–5.3)
POTASSIUM SERPL-SCNC: 4.4 MMOL/L — SIGNIFICANT CHANGE UP (ref 3.5–5.3)
POTASSIUM SERPL-SCNC: 4.4 MMOL/L — SIGNIFICANT CHANGE UP (ref 3.5–5.3)
POTASSIUM SERPL-SCNC: 4.5 MMOL/L — SIGNIFICANT CHANGE UP (ref 3.5–5.3)
POTASSIUM SERPL-SCNC: 4.6 MMOL/L — SIGNIFICANT CHANGE UP (ref 3.5–5.3)
POTASSIUM SERPL-SCNC: 4.7 MMOL/L — SIGNIFICANT CHANGE UP (ref 3.5–5.3)
POTASSIUM SERPL-SCNC: 4.7 MMOL/L — SIGNIFICANT CHANGE UP (ref 3.5–5.3)
POTASSIUM SERPL-SCNC: 4.8 MMOL/L — SIGNIFICANT CHANGE UP (ref 3.5–5.3)
PROCALCITONIN SERPL-MCNC: 0.68 NG/ML — HIGH (ref 0.02–0.1)
PROT ?TM UR-MCNC: 142 MG/DL — HIGH (ref 0–12)
PROT SERPL-MCNC: 3 G/DL — LOW (ref 6–8.3)
PROT SERPL-MCNC: 3.8 G/DL — LOW (ref 6–8.3)
PROT SERPL-MCNC: 4 G/DL — LOW (ref 6–8.3)
PROT SERPL-MCNC: 4.1 G/DL — LOW (ref 6–8.3)
PROT SERPL-MCNC: 4.1 G/DL — LOW (ref 6–8.3)
PROT SERPL-MCNC: 4.2 G/DL — LOW (ref 6–8.3)
PROT SERPL-MCNC: 4.3 G/DL — LOW (ref 6–8.3)
PROT SERPL-MCNC: 4.4 G/DL — LOW (ref 6–8.3)
PROT SERPL-MCNC: 4.6 G/DL — LOW (ref 6–8.3)
PROT SERPL-MCNC: 5.1 G/DL — LOW (ref 6–8.3)
PROT SERPL-MCNC: 5.2 G/DL — LOW (ref 6–8.3)
PROT UR-MCNC: 100 — SIGNIFICANT CHANGE UP
PROT/CREAT UR-RTO: 2.6 RATIO — HIGH (ref 0–0.2)
PROTHROM AB SERPL-ACNC: 13.6 SEC — SIGNIFICANT CHANGE UP (ref 10.6–13.6)
PROTHROM AB SERPL-ACNC: 14.6 SEC — HIGH (ref 10.6–13.6)
PROTHROM AB SERPL-ACNC: 15.5 SEC — HIGH (ref 10.6–13.6)
PROTHROM AB SERPL-ACNC: 15.5 SEC — HIGH (ref 10.6–13.6)
PROTHROM AB SERPL-ACNC: 15.7 SEC — HIGH (ref 10.6–13.6)
PROTHROM AB SERPL-ACNC: 16.1 SEC — HIGH (ref 10.6–13.6)
PROTHROM AB SERPL-ACNC: 16.3 SEC — HIGH (ref 10.6–13.6)
PROTHROM AB SERPL-ACNC: 16.4 SEC — HIGH (ref 10.6–13.6)
PROTHROM AB SERPL-ACNC: 16.7 SEC — HIGH (ref 10.6–13.6)
PROTHROM AB SERPL-ACNC: 16.9 SEC — HIGH (ref 10.6–13.6)
PROTHROM AB SERPL-ACNC: 22.6 SEC — HIGH (ref 10.6–13.6)
RAPIDTEG MAXIMUM AMPLITUDE: 54.7 MM — SIGNIFICANT CHANGE UP (ref 52–70)
RBC # BLD: 2.27 M/UL — LOW (ref 4.2–5.8)
RBC # BLD: 2.3 M/UL — LOW (ref 4.2–5.8)
RBC # BLD: 2.33 M/UL — LOW (ref 4.2–5.8)
RBC # BLD: 2.33 M/UL — LOW (ref 4.2–5.8)
RBC # BLD: 2.36 M/UL — LOW (ref 4.2–5.8)
RBC # BLD: 2.37 M/UL — LOW (ref 4.2–5.8)
RBC # BLD: 2.4 M/UL — LOW (ref 4.2–5.8)
RBC # BLD: 2.43 M/UL — LOW (ref 4.2–5.8)
RBC # BLD: 2.46 M/UL — LOW (ref 4.2–5.8)
RBC # BLD: 2.47 M/UL — LOW (ref 4.2–5.8)
RBC # BLD: 2.52 M/UL — LOW (ref 4.2–5.8)
RBC # BLD: 2.52 M/UL — LOW (ref 4.2–5.8)
RBC # BLD: 2.53 M/UL — LOW (ref 4.2–5.8)
RBC # BLD: 2.53 M/UL — LOW (ref 4.2–5.8)
RBC # BLD: 2.55 M/UL — LOW (ref 4.2–5.8)
RBC # BLD: 2.55 M/UL — LOW (ref 4.2–5.8)
RBC # BLD: 2.58 M/UL — LOW (ref 4.2–5.8)
RBC # BLD: 2.59 M/UL — LOW (ref 4.2–5.8)
RBC # BLD: 2.63 M/UL — LOW (ref 4.2–5.8)
RBC # BLD: 2.65 M/UL — LOW (ref 4.2–5.8)
RBC # BLD: 2.67 M/UL — LOW (ref 4.2–5.8)
RBC # BLD: 2.67 M/UL — LOW (ref 4.2–5.8)
RBC # BLD: 2.68 M/UL — LOW (ref 4.2–5.8)
RBC # BLD: 2.69 M/UL — LOW (ref 4.2–5.8)
RBC # BLD: 2.7 M/UL — LOW (ref 4.2–5.8)
RBC # BLD: 2.72 M/UL — LOW (ref 4.2–5.8)
RBC # BLD: 2.84 M/UL — LOW (ref 4.2–5.8)
RBC # BLD: 2.87 M/UL — LOW (ref 4.2–5.8)
RBC # BLD: 2.89 M/UL — LOW (ref 4.2–5.8)
RBC # BLD: 2.91 M/UL — LOW (ref 4.2–5.8)
RBC # BLD: 2.93 M/UL — LOW (ref 4.2–5.8)
RBC # BLD: 2.96 M/UL — LOW (ref 4.2–5.8)
RBC # BLD: 2.99 M/UL — LOW (ref 4.2–5.8)
RBC # BLD: 3.07 M/UL — LOW (ref 4.2–5.8)
RBC # BLD: 3.12 M/UL — LOW (ref 4.2–5.8)
RBC # BLD: 3.25 M/UL — LOW (ref 4.2–5.8)
RBC # BLD: 3.33 M/UL — LOW (ref 4.2–5.8)
RBC # BLD: 3.46 M/UL — LOW (ref 4.2–5.8)
RBC # FLD: 15.3 % — HIGH (ref 10.3–14.5)
RBC # FLD: 15.4 % — HIGH (ref 10.3–14.5)
RBC # FLD: 15.6 % — HIGH (ref 10.3–14.5)
RBC # FLD: 15.6 % — HIGH (ref 10.3–14.5)
RBC # FLD: 15.9 % — HIGH (ref 10.3–14.5)
RBC # FLD: 15.9 % — HIGH (ref 10.3–14.5)
RBC # FLD: 16 % — HIGH (ref 10.3–14.5)
RBC # FLD: 16.2 % — HIGH (ref 10.3–14.5)
RBC # FLD: 16.3 % — HIGH (ref 10.3–14.5)
RBC # FLD: 16.3 % — HIGH (ref 10.3–14.5)
RBC # FLD: 16.4 % — HIGH (ref 10.3–14.5)
RBC # FLD: 16.4 % — HIGH (ref 10.3–14.5)
RBC # FLD: 16.5 % — HIGH (ref 10.3–14.5)
RBC # FLD: 16.6 % — HIGH (ref 10.3–14.5)
RBC # FLD: 16.6 % — HIGH (ref 10.3–14.5)
RBC # FLD: 16.7 % — HIGH (ref 10.3–14.5)
RBC # FLD: 16.7 % — HIGH (ref 10.3–14.5)
RBC # FLD: 16.8 % — HIGH (ref 10.3–14.5)
RBC # FLD: 16.9 % — HIGH (ref 10.3–14.5)
RBC # FLD: 16.9 % — HIGH (ref 10.3–14.5)
RBC # FLD: 17.3 % — HIGH (ref 10.3–14.5)
RBC # FLD: 17.4 % — HIGH (ref 10.3–14.5)
RBC # FLD: 18 % — HIGH (ref 10.3–14.5)
RBC # FLD: 18.6 % — HIGH (ref 10.3–14.5)
RBC # FLD: 18.8 % — HIGH (ref 10.3–14.5)
RBC # FLD: 18.9 % — HIGH (ref 10.3–14.5)
RBC # FLD: 19 % — HIGH (ref 10.3–14.5)
RBC # FLD: 19.6 % — HIGH (ref 10.3–14.5)
RBC # FLD: 19.6 % — HIGH (ref 10.3–14.5)
RBC CASTS # UR COMP ASSIST: 7 /HPF — HIGH (ref 0–4)
RH IG SCN BLD-IMP: POSITIVE — SIGNIFICANT CHANGE UP
SAO2 % BLDV: 82 % — SIGNIFICANT CHANGE UP (ref 67–88)
SARS-COV-2 IGG+IGM SERPL QL IA: 69.7 U/ML — HIGH
SARS-COV-2 IGG+IGM SERPL QL IA: POSITIVE
SODIUM SERPL-SCNC: 138 MMOL/L — SIGNIFICANT CHANGE UP (ref 135–145)
SODIUM SERPL-SCNC: 138 MMOL/L — SIGNIFICANT CHANGE UP (ref 135–145)
SODIUM SERPL-SCNC: 139 MMOL/L — SIGNIFICANT CHANGE UP (ref 135–145)
SODIUM SERPL-SCNC: 139 MMOL/L — SIGNIFICANT CHANGE UP (ref 135–145)
SODIUM SERPL-SCNC: 140 MMOL/L — SIGNIFICANT CHANGE UP (ref 135–145)
SODIUM SERPL-SCNC: 141 MMOL/L — SIGNIFICANT CHANGE UP (ref 135–145)
SODIUM SERPL-SCNC: 142 MMOL/L — SIGNIFICANT CHANGE UP (ref 135–145)
SODIUM SERPL-SCNC: 143 MMOL/L — SIGNIFICANT CHANGE UP (ref 135–145)
SODIUM SERPL-SCNC: 143 MMOL/L — SIGNIFICANT CHANGE UP (ref 135–145)
SODIUM SERPL-SCNC: 144 MMOL/L — SIGNIFICANT CHANGE UP (ref 135–145)
SODIUM SERPL-SCNC: 144 MMOL/L — SIGNIFICANT CHANGE UP (ref 135–145)
SODIUM SERPL-SCNC: 145 MMOL/L — SIGNIFICANT CHANGE UP (ref 135–145)
SODIUM SERPL-SCNC: 146 MMOL/L — HIGH (ref 135–145)
SODIUM SERPL-SCNC: 147 MMOL/L — HIGH (ref 135–145)
SODIUM SERPL-SCNC: 147 MMOL/L — HIGH (ref 135–145)
SODIUM SERPL-SCNC: 148 MMOL/L — HIGH (ref 135–145)
SODIUM SERPL-SCNC: 150 MMOL/L — HIGH (ref 135–145)
SP GR SPEC: 1.02 — SIGNIFICANT CHANGE UP (ref 1.01–1.02)
SPECIMEN SOURCE: SIGNIFICANT CHANGE UP
TEG FUNCTIONAL FIBRINOGEN: 16.4 MM — SIGNIFICANT CHANGE UP (ref 15–32)
TEG MAXIMUM AMPLITUDE: 56.1 MM — SIGNIFICANT CHANGE UP (ref 52–69)
TEG REACTION TIME: 4.7 MIN — SIGNIFICANT CHANGE UP (ref 4.6–9.1)
TROPONIN T, HIGH SENSITIVITY RESULT: 1211 NG/L — HIGH (ref 0–51)
TROPONIN T, HIGH SENSITIVITY RESULT: 1305 NG/L — HIGH (ref 0–51)
TROPONIN T, HIGH SENSITIVITY RESULT: 1506 NG/L — HIGH (ref 0–51)
TROPONIN T, HIGH SENSITIVITY RESULT: 1576 NG/L — HIGH (ref 0–51)
TROPONIN T, HIGH SENSITIVITY RESULT: 1669 NG/L — HIGH (ref 0–51)
TROPONIN T, HIGH SENSITIVITY RESULT: 1858 NG/L — HIGH (ref 0–51)
TROPONIN T, HIGH SENSITIVITY RESULT: 775 NG/L — HIGH (ref 0–51)
UROBILINOGEN FLD QL: NEGATIVE — SIGNIFICANT CHANGE UP
WBC # BLD: 14.16 K/UL — HIGH (ref 3.8–10.5)
WBC # BLD: 14.74 K/UL — HIGH (ref 3.8–10.5)
WBC # BLD: 14.78 K/UL — HIGH (ref 3.8–10.5)
WBC # BLD: 15 K/UL — HIGH (ref 3.8–10.5)
WBC # BLD: 15.28 K/UL — HIGH (ref 3.8–10.5)
WBC # BLD: 15.37 K/UL — HIGH (ref 3.8–10.5)
WBC # BLD: 15.55 K/UL — HIGH (ref 3.8–10.5)
WBC # BLD: 15.58 K/UL — HIGH (ref 3.8–10.5)
WBC # BLD: 15.62 K/UL — HIGH (ref 3.8–10.5)
WBC # BLD: 16.02 K/UL — HIGH (ref 3.8–10.5)
WBC # BLD: 16.45 K/UL — HIGH (ref 3.8–10.5)
WBC # BLD: 16.46 K/UL — HIGH (ref 3.8–10.5)
WBC # BLD: 16.73 K/UL — HIGH (ref 3.8–10.5)
WBC # BLD: 16.76 K/UL — HIGH (ref 3.8–10.5)
WBC # BLD: 17.03 K/UL — HIGH (ref 3.8–10.5)
WBC # BLD: 17.21 K/UL — HIGH (ref 3.8–10.5)
WBC # BLD: 17.88 K/UL — HIGH (ref 3.8–10.5)
WBC # BLD: 18.55 K/UL — HIGH (ref 3.8–10.5)
WBC # BLD: 18.96 K/UL — HIGH (ref 3.8–10.5)
WBC # BLD: 19.74 K/UL — HIGH (ref 3.8–10.5)
WBC # BLD: 19.89 K/UL — HIGH (ref 3.8–10.5)
WBC # BLD: 19.96 K/UL — HIGH (ref 3.8–10.5)
WBC # BLD: 21.07 K/UL — HIGH (ref 3.8–10.5)
WBC # BLD: 21.87 K/UL — HIGH (ref 3.8–10.5)
WBC # BLD: 21.87 K/UL — HIGH (ref 3.8–10.5)
WBC # BLD: 22.18 K/UL — HIGH (ref 3.8–10.5)
WBC # BLD: 22.56 K/UL — HIGH (ref 3.8–10.5)
WBC # BLD: 23.26 K/UL — HIGH (ref 3.8–10.5)
WBC # BLD: 26.61 K/UL — HIGH (ref 3.8–10.5)
WBC # BLD: 4.64 K/UL — SIGNIFICANT CHANGE UP (ref 3.8–10.5)
WBC # BLD: 4.95 K/UL — SIGNIFICANT CHANGE UP (ref 3.8–10.5)
WBC # BLD: 5.01 K/UL — SIGNIFICANT CHANGE UP (ref 3.8–10.5)
WBC # BLD: 5.43 K/UL — SIGNIFICANT CHANGE UP (ref 3.8–10.5)
WBC # BLD: 6.36 K/UL — SIGNIFICANT CHANGE UP (ref 3.8–10.5)
WBC # BLD: 6.4 K/UL — SIGNIFICANT CHANGE UP (ref 3.8–10.5)
WBC # BLD: 6.62 K/UL — SIGNIFICANT CHANGE UP (ref 3.8–10.5)
WBC # BLD: 7.94 K/UL — SIGNIFICANT CHANGE UP (ref 3.8–10.5)
WBC # BLD: 9.88 K/UL — SIGNIFICANT CHANGE UP (ref 3.8–10.5)
WBC # FLD AUTO: 14.16 K/UL — HIGH (ref 3.8–10.5)
WBC # FLD AUTO: 14.74 K/UL — HIGH (ref 3.8–10.5)
WBC # FLD AUTO: 14.78 K/UL — HIGH (ref 3.8–10.5)
WBC # FLD AUTO: 15 K/UL — HIGH (ref 3.8–10.5)
WBC # FLD AUTO: 15.28 K/UL — HIGH (ref 3.8–10.5)
WBC # FLD AUTO: 15.37 K/UL — HIGH (ref 3.8–10.5)
WBC # FLD AUTO: 15.55 K/UL — HIGH (ref 3.8–10.5)
WBC # FLD AUTO: 15.58 K/UL — HIGH (ref 3.8–10.5)
WBC # FLD AUTO: 15.62 K/UL — HIGH (ref 3.8–10.5)
WBC # FLD AUTO: 16.02 K/UL — HIGH (ref 3.8–10.5)
WBC # FLD AUTO: 16.45 K/UL — HIGH (ref 3.8–10.5)
WBC # FLD AUTO: 16.46 K/UL — HIGH (ref 3.8–10.5)
WBC # FLD AUTO: 16.73 K/UL — HIGH (ref 3.8–10.5)
WBC # FLD AUTO: 16.76 K/UL — HIGH (ref 3.8–10.5)
WBC # FLD AUTO: 17.03 K/UL — HIGH (ref 3.8–10.5)
WBC # FLD AUTO: 17.21 K/UL — HIGH (ref 3.8–10.5)
WBC # FLD AUTO: 17.88 K/UL — HIGH (ref 3.8–10.5)
WBC # FLD AUTO: 18.55 K/UL — HIGH (ref 3.8–10.5)
WBC # FLD AUTO: 18.96 K/UL — HIGH (ref 3.8–10.5)
WBC # FLD AUTO: 19.74 K/UL — HIGH (ref 3.8–10.5)
WBC # FLD AUTO: 19.89 K/UL — HIGH (ref 3.8–10.5)
WBC # FLD AUTO: 19.96 K/UL — HIGH (ref 3.8–10.5)
WBC # FLD AUTO: 21.07 K/UL — HIGH (ref 3.8–10.5)
WBC # FLD AUTO: 21.87 K/UL — HIGH (ref 3.8–10.5)
WBC # FLD AUTO: 21.87 K/UL — HIGH (ref 3.8–10.5)
WBC # FLD AUTO: 22.18 K/UL — HIGH (ref 3.8–10.5)
WBC # FLD AUTO: 22.56 K/UL — HIGH (ref 3.8–10.5)
WBC # FLD AUTO: 23.26 K/UL — HIGH (ref 3.8–10.5)
WBC # FLD AUTO: 26.61 K/UL — HIGH (ref 3.8–10.5)
WBC # FLD AUTO: 4.64 K/UL — SIGNIFICANT CHANGE UP (ref 3.8–10.5)
WBC # FLD AUTO: 4.95 K/UL — SIGNIFICANT CHANGE UP (ref 3.8–10.5)
WBC # FLD AUTO: 5.01 K/UL — SIGNIFICANT CHANGE UP (ref 3.8–10.5)
WBC # FLD AUTO: 5.43 K/UL — SIGNIFICANT CHANGE UP (ref 3.8–10.5)
WBC # FLD AUTO: 6.36 K/UL — SIGNIFICANT CHANGE UP (ref 3.8–10.5)
WBC # FLD AUTO: 6.4 K/UL — SIGNIFICANT CHANGE UP (ref 3.8–10.5)
WBC # FLD AUTO: 6.62 K/UL — SIGNIFICANT CHANGE UP (ref 3.8–10.5)
WBC # FLD AUTO: 7.94 K/UL — SIGNIFICANT CHANGE UP (ref 3.8–10.5)
WBC # FLD AUTO: 9.88 K/UL — SIGNIFICANT CHANGE UP (ref 3.8–10.5)
WBC UR QL: 15 /HPF — HIGH (ref 0–5)

## 2021-01-01 PROCEDURE — 99291 CRITICAL CARE FIRST HOUR: CPT

## 2021-01-01 PROCEDURE — 36556 INSERT NON-TUNNEL CV CATH: CPT

## 2021-01-01 PROCEDURE — 85730 THROMBOPLASTIN TIME PARTIAL: CPT

## 2021-01-01 PROCEDURE — 71045 X-RAY EXAM CHEST 1 VIEW: CPT | Mod: 26

## 2021-01-01 PROCEDURE — 84484 ASSAY OF TROPONIN QUANT: CPT

## 2021-01-01 PROCEDURE — 93970 EXTREMITY STUDY: CPT | Mod: 26

## 2021-01-01 PROCEDURE — 85396 CLOTTING ASSAY WHOLE BLOOD: CPT

## 2021-01-01 PROCEDURE — 81001 URINALYSIS AUTO W/SCOPE: CPT

## 2021-01-01 PROCEDURE — 99233 SBSQ HOSP IP/OBS HIGH 50: CPT | Mod: GC,25

## 2021-01-01 PROCEDURE — 87086 URINE CULTURE/COLONY COUNT: CPT

## 2021-01-01 PROCEDURE — 82941 ASSAY OF GASTRIN: CPT

## 2021-01-01 PROCEDURE — 83605 ASSAY OF LACTIC ACID: CPT

## 2021-01-01 PROCEDURE — 86901 BLOOD TYPING SEROLOGIC RH(D): CPT

## 2021-01-01 PROCEDURE — 74174 CTA ABD&PLVS W/CONTRAST: CPT

## 2021-01-01 PROCEDURE — 74018 RADEX ABDOMEN 1 VIEW: CPT

## 2021-01-01 PROCEDURE — 82962 GLUCOSE BLOOD TEST: CPT

## 2021-01-01 PROCEDURE — 83735 ASSAY OF MAGNESIUM: CPT

## 2021-01-01 PROCEDURE — 74174 CTA ABD&PLVS W/CONTRAST: CPT | Mod: 26

## 2021-01-01 PROCEDURE — 99232 SBSQ HOSP IP/OBS MODERATE 35: CPT | Mod: GC

## 2021-01-01 PROCEDURE — 82565 ASSAY OF CREATININE: CPT

## 2021-01-01 PROCEDURE — 86850 RBC ANTIBODY SCREEN: CPT

## 2021-01-01 PROCEDURE — 99291 CRITICAL CARE FIRST HOUR: CPT | Mod: 25

## 2021-01-01 PROCEDURE — 93306 TTE W/DOPPLER COMPLETE: CPT | Mod: 26

## 2021-01-01 PROCEDURE — 91110 GI TRC IMG INTRAL ESOPH-ILE: CPT | Mod: 26,GC

## 2021-01-01 PROCEDURE — 84145 PROCALCITONIN (PCT): CPT

## 2021-01-01 PROCEDURE — 36430 TRANSFUSION BLD/BLD COMPNT: CPT

## 2021-01-01 PROCEDURE — 86677 HELICOBACTER PYLORI ANTIBODY: CPT

## 2021-01-01 PROCEDURE — 99223 1ST HOSP IP/OBS HIGH 75: CPT | Mod: GC

## 2021-01-01 PROCEDURE — 93005 ELECTROCARDIOGRAM TRACING: CPT

## 2021-01-01 PROCEDURE — 85025 COMPLETE CBC W/AUTO DIFF WBC: CPT

## 2021-01-01 PROCEDURE — P9037: CPT

## 2021-01-01 PROCEDURE — P9016: CPT

## 2021-01-01 PROCEDURE — 86900 BLOOD TYPING SEROLOGIC ABO: CPT

## 2021-01-01 PROCEDURE — 80053 COMPREHEN METABOLIC PANEL: CPT

## 2021-01-01 PROCEDURE — 84295 ASSAY OF SERUM SODIUM: CPT

## 2021-01-01 PROCEDURE — 99232 SBSQ HOSP IP/OBS MODERATE 35: CPT

## 2021-01-01 PROCEDURE — 85610 PROTHROMBIN TIME: CPT

## 2021-01-01 PROCEDURE — P9059: CPT

## 2021-01-01 PROCEDURE — 86769 SARS-COV-2 COVID-19 ANTIBODY: CPT

## 2021-01-01 PROCEDURE — 82570 ASSAY OF URINE CREATININE: CPT

## 2021-01-01 PROCEDURE — 85018 HEMOGLOBIN: CPT

## 2021-01-01 PROCEDURE — 82330 ASSAY OF CALCIUM: CPT

## 2021-01-01 PROCEDURE — 71045 X-RAY EXAM CHEST 1 VIEW: CPT

## 2021-01-01 PROCEDURE — 94002 VENT MGMT INPAT INIT DAY: CPT

## 2021-01-01 PROCEDURE — 86923 COMPATIBILITY TEST ELECTRIC: CPT

## 2021-01-01 PROCEDURE — 70450 CT HEAD/BRAIN W/O DYE: CPT | Mod: 26

## 2021-01-01 PROCEDURE — 84100 ASSAY OF PHOSPHORUS: CPT

## 2021-01-01 PROCEDURE — 36620 INSERTION CATHETER ARTERY: CPT | Mod: GC

## 2021-01-01 PROCEDURE — 74018 RADEX ABDOMEN 1 VIEW: CPT | Mod: 26

## 2021-01-01 PROCEDURE — 93010 ELECTROCARDIOGRAM REPORT: CPT

## 2021-01-01 PROCEDURE — 99232 SBSQ HOSP IP/OBS MODERATE 35: CPT | Mod: GC,25

## 2021-01-01 PROCEDURE — 36556 INSERT NON-TUNNEL CV CATH: CPT | Mod: GC

## 2021-01-01 PROCEDURE — 45378 DIAGNOSTIC COLONOSCOPY: CPT | Mod: GC

## 2021-01-01 PROCEDURE — 99223 1ST HOSP IP/OBS HIGH 75: CPT | Mod: GC,25

## 2021-01-01 PROCEDURE — P9047: CPT

## 2021-01-01 PROCEDURE — 90945 DIALYSIS ONE EVALUATION: CPT | Mod: GC

## 2021-01-01 PROCEDURE — 85014 HEMATOCRIT: CPT

## 2021-01-01 PROCEDURE — 82803 BLOOD GASES ANY COMBINATION: CPT

## 2021-01-01 PROCEDURE — 93306 TTE W/DOPPLER COMPLETE: CPT

## 2021-01-01 PROCEDURE — 71045 X-RAY EXAM CHEST 1 VIEW: CPT | Mod: 26,77

## 2021-01-01 PROCEDURE — 87040 BLOOD CULTURE FOR BACTERIA: CPT

## 2021-01-01 PROCEDURE — 99221 1ST HOSP IP/OBS SF/LOW 40: CPT

## 2021-01-01 PROCEDURE — 85384 FIBRINOGEN ACTIVITY: CPT

## 2021-01-01 PROCEDURE — 93970 EXTREMITY STUDY: CPT

## 2021-01-01 PROCEDURE — 84132 ASSAY OF SERUM POTASSIUM: CPT

## 2021-01-01 PROCEDURE — 84156 ASSAY OF PROTEIN URINE: CPT

## 2021-01-01 PROCEDURE — 82435 ASSAY OF BLOOD CHLORIDE: CPT

## 2021-01-01 PROCEDURE — 44360 SMALL BOWEL ENDOSCOPY: CPT | Mod: GC

## 2021-01-01 PROCEDURE — 31500 INSERT EMERGENCY AIRWAY: CPT | Mod: GC

## 2021-01-01 PROCEDURE — 99233 SBSQ HOSP IP/OBS HIGH 50: CPT

## 2021-01-01 PROCEDURE — 99233 SBSQ HOSP IP/OBS HIGH 50: CPT | Mod: GC

## 2021-01-01 PROCEDURE — 85027 COMPLETE CBC AUTOMATED: CPT

## 2021-01-01 PROCEDURE — 87070 CULTURE OTHR SPECIMN AEROBIC: CPT

## 2021-01-01 PROCEDURE — 94003 VENT MGMT INPAT SUBQ DAY: CPT

## 2021-01-01 PROCEDURE — 80048 BASIC METABOLIC PNL TOTAL CA: CPT

## 2021-01-01 PROCEDURE — 82947 ASSAY GLUCOSE BLOOD QUANT: CPT

## 2021-01-01 PROCEDURE — 70450 CT HEAD/BRAIN W/O DYE: CPT

## 2021-01-01 RX ORDER — METOPROLOL TARTRATE 50 MG
0 TABLET ORAL
Qty: 0 | Refills: 3 | DISCHARGE

## 2021-01-01 RX ORDER — DEXMEDETOMIDINE HYDROCHLORIDE IN 0.9% SODIUM CHLORIDE 4 UG/ML
0.5 INJECTION INTRAVENOUS
Qty: 200 | Refills: 0 | Status: DISCONTINUED | OUTPATIENT
Start: 2021-01-01 | End: 2021-01-01

## 2021-01-01 RX ORDER — SODIUM BICARBONATE 1 MEQ/ML
50 SYRINGE (ML) INTRAVENOUS ONCE
Refills: 0 | Status: COMPLETED | OUTPATIENT
Start: 2021-01-01 | End: 2021-01-01

## 2021-01-01 RX ORDER — ALBUMIN HUMAN 25 %
50 VIAL (ML) INTRAVENOUS EVERY 6 HOURS
Refills: 0 | Status: DISCONTINUED | OUTPATIENT
Start: 2021-01-01 | End: 2021-01-01

## 2021-01-01 RX ORDER — DEXTROSE 50 % IN WATER 50 %
25 SYRINGE (ML) INTRAVENOUS ONCE
Refills: 0 | Status: COMPLETED | OUTPATIENT
Start: 2021-01-01 | End: 2021-01-01

## 2021-01-01 RX ORDER — VANCOMYCIN HCL 1 G
1000 VIAL (EA) INTRAVENOUS ONCE
Refills: 0 | Status: COMPLETED | OUTPATIENT
Start: 2021-01-01 | End: 2021-01-01

## 2021-01-01 RX ORDER — PIPERACILLIN AND TAZOBACTAM 4; .5 G/20ML; G/20ML
3.38 INJECTION, POWDER, LYOPHILIZED, FOR SOLUTION INTRAVENOUS EVERY 12 HOURS
Refills: 0 | Status: DISCONTINUED | OUTPATIENT
Start: 2021-01-01 | End: 2021-01-01

## 2021-01-01 RX ORDER — CALCIUM GLUCONATE 100 MG/ML
2 VIAL (ML) INTRAVENOUS ONCE
Refills: 0 | Status: COMPLETED | OUTPATIENT
Start: 2021-01-01 | End: 2021-01-01

## 2021-01-01 RX ORDER — DEXTROSE 10 % IN WATER 10 %
1000 INTRAVENOUS SOLUTION INTRAVENOUS
Refills: 0 | Status: DISCONTINUED | OUTPATIENT
Start: 2021-01-01 | End: 2021-01-01

## 2021-01-01 RX ORDER — POTASSIUM CHLORIDE 20 MEQ
20 PACKET (EA) ORAL ONCE
Refills: 0 | Status: COMPLETED | OUTPATIENT
Start: 2021-01-01 | End: 2021-01-01

## 2021-01-01 RX ORDER — VASOPRESSIN 20 [USP'U]/ML
0.04 INJECTION INTRAVENOUS
Qty: 50 | Refills: 0 | Status: DISCONTINUED | OUTPATIENT
Start: 2021-01-01 | End: 2021-01-01

## 2021-01-01 RX ORDER — DEXTROSE 10 % IN WATER 10 %
500 INTRAVENOUS SOLUTION INTRAVENOUS
Refills: 0 | Status: DISCONTINUED | OUTPATIENT
Start: 2021-01-01 | End: 2021-01-01

## 2021-01-01 RX ORDER — SODIUM,POTASSIUM PHOSPHATES 278-250MG
1 POWDER IN PACKET (EA) ORAL ONCE
Refills: 0 | Status: COMPLETED | OUTPATIENT
Start: 2021-01-01 | End: 2021-01-01

## 2021-01-01 RX ORDER — PREGABALIN 225 MG/1
1000 CAPSULE ORAL DAILY
Refills: 0 | Status: DISCONTINUED | OUTPATIENT
Start: 2021-01-01 | End: 2021-01-01

## 2021-01-01 RX ORDER — CHLORHEXIDINE GLUCONATE 213 G/1000ML
1 SOLUTION TOPICAL
Refills: 0 | Status: DISCONTINUED | OUTPATIENT
Start: 2021-01-01 | End: 2021-01-01

## 2021-01-01 RX ORDER — SODIUM CHLORIDE 9 MG/ML
500 INJECTION, SOLUTION INTRAVENOUS ONCE
Refills: 0 | Status: COMPLETED | OUTPATIENT
Start: 2021-01-01 | End: 2021-01-01

## 2021-01-01 RX ORDER — PIPERACILLIN AND TAZOBACTAM 4; .5 G/20ML; G/20ML
3.38 INJECTION, POWDER, LYOPHILIZED, FOR SOLUTION INTRAVENOUS ONCE
Refills: 0 | Status: COMPLETED | OUTPATIENT
Start: 2021-01-01 | End: 2021-01-01

## 2021-01-01 RX ORDER — FINASTERIDE 5 MG/1
5 TABLET, FILM COATED ORAL DAILY
Refills: 0 | Status: DISCONTINUED | OUTPATIENT
Start: 2021-01-01 | End: 2021-01-01

## 2021-01-01 RX ORDER — ACETAMINOPHEN 500 MG
1000 TABLET ORAL ONCE
Refills: 0 | Status: COMPLETED | OUTPATIENT
Start: 2021-01-01 | End: 2021-01-01

## 2021-01-01 RX ORDER — CEFTRIAXONE 500 MG/1
2000 INJECTION, POWDER, FOR SOLUTION INTRAMUSCULAR; INTRAVENOUS EVERY 24 HOURS
Refills: 0 | Status: DISCONTINUED | OUTPATIENT
Start: 2021-01-01 | End: 2021-01-01

## 2021-01-01 RX ORDER — AMLODIPINE BESYLATE 2.5 MG/1
1 TABLET ORAL
Qty: 0 | Refills: 0 | DISCHARGE

## 2021-01-01 RX ORDER — PANTOPRAZOLE SODIUM 20 MG/1
40 TABLET, DELAYED RELEASE ORAL EVERY 12 HOURS
Refills: 0 | Status: DISCONTINUED | OUTPATIENT
Start: 2021-01-01 | End: 2021-01-01

## 2021-01-01 RX ORDER — KETAMINE HYDROCHLORIDE 100 MG/ML
60 INJECTION INTRAMUSCULAR; INTRAVENOUS ONCE
Refills: 0 | Status: DISCONTINUED | OUTPATIENT
Start: 2021-01-01 | End: 2021-01-01

## 2021-01-01 RX ORDER — NOREPINEPHRINE BITARTRATE/D5W 8 MG/250ML
0.05 PLASTIC BAG, INJECTION (ML) INTRAVENOUS
Qty: 8 | Refills: 0 | Status: DISCONTINUED | OUTPATIENT
Start: 2021-01-01 | End: 2021-01-01

## 2021-01-01 RX ORDER — NOREPINEPHRINE BITARTRATE/D5W 8 MG/250ML
0.05 PLASTIC BAG, INJECTION (ML) INTRAVENOUS
Qty: 32 | Refills: 0 | Status: DISCONTINUED | OUTPATIENT
Start: 2021-01-01 | End: 2021-01-01

## 2021-01-01 RX ORDER — CALCIUM CHLORIDE
1000 POWDER (GRAM) MISCELLANEOUS ONCE
Refills: 0 | Status: COMPLETED | OUTPATIENT
Start: 2021-01-01 | End: 2021-01-01

## 2021-01-01 RX ORDER — MAGNESIUM SULFATE 500 MG/ML
2 VIAL (ML) INJECTION ONCE
Refills: 0 | Status: COMPLETED | OUTPATIENT
Start: 2021-01-01 | End: 2021-01-01

## 2021-01-01 RX ORDER — PHENYLEPHRINE HYDROCHLORIDE 10 MG/ML
0.6 INJECTION INTRAVENOUS
Qty: 40 | Refills: 0 | Status: DISCONTINUED | OUTPATIENT
Start: 2021-01-01 | End: 2021-01-01

## 2021-01-01 RX ORDER — POTASSIUM PHOSPHATE, MONOBASIC POTASSIUM PHOSPHATE, DIBASIC 236; 224 MG/ML; MG/ML
30 INJECTION, SOLUTION INTRAVENOUS ONCE
Refills: 0 | Status: COMPLETED | OUTPATIENT
Start: 2021-01-01 | End: 2021-01-01

## 2021-01-01 RX ORDER — PREGABALIN 225 MG/1
0 CAPSULE ORAL
Qty: 0 | Refills: 0 | DISCHARGE

## 2021-01-01 RX ORDER — MEROPENEM 1 G/30ML
INJECTION INTRAVENOUS
Refills: 0 | Status: DISCONTINUED | OUTPATIENT
Start: 2021-01-01 | End: 2021-01-01

## 2021-01-01 RX ORDER — FOLIC ACID 0.8 MG
1 TABLET ORAL DAILY
Refills: 0 | Status: DISCONTINUED | OUTPATIENT
Start: 2021-01-01 | End: 2021-01-01

## 2021-01-01 RX ORDER — SODIUM BICARBONATE 1 MEQ/ML
0.08 SYRINGE (ML) INTRAVENOUS
Qty: 150 | Refills: 0 | Status: DISCONTINUED | OUTPATIENT
Start: 2021-01-01 | End: 2021-01-01

## 2021-01-01 RX ORDER — FENTANYL CITRATE 50 UG/ML
100 INJECTION INTRAVENOUS ONCE
Refills: 0 | Status: DISCONTINUED | OUTPATIENT
Start: 2021-01-01 | End: 2021-01-01

## 2021-01-01 RX ORDER — ATORVASTATIN CALCIUM 80 MG/1
1 TABLET, FILM COATED ORAL
Qty: 0 | Refills: 0 | DISCHARGE

## 2021-01-01 RX ORDER — FERROUS SULFATE 325(65) MG
1 TABLET ORAL
Qty: 0 | Refills: 0 | DISCHARGE

## 2021-01-01 RX ORDER — MIDAZOLAM HYDROCHLORIDE 1 MG/ML
2 INJECTION, SOLUTION INTRAMUSCULAR; INTRAVENOUS ONCE
Refills: 0 | Status: DISCONTINUED | OUTPATIENT
Start: 2021-01-01 | End: 2021-01-01

## 2021-01-01 RX ORDER — CHLORHEXIDINE GLUCONATE 213 G/1000ML
15 SOLUTION TOPICAL EVERY 12 HOURS
Refills: 0 | Status: DISCONTINUED | OUTPATIENT
Start: 2021-01-01 | End: 2021-01-01

## 2021-01-01 RX ORDER — PHENYLEPHRINE HYDROCHLORIDE 10 MG/ML
4 INJECTION INTRAVENOUS
Qty: 40 | Refills: 0 | Status: DISCONTINUED | OUTPATIENT
Start: 2021-01-01 | End: 2021-01-01

## 2021-01-01 RX ORDER — SODIUM CHLORIDE 9 MG/ML
1000 INJECTION, SOLUTION INTRAVENOUS
Refills: 0 | Status: DISCONTINUED | OUTPATIENT
Start: 2021-01-01 | End: 2021-01-01

## 2021-01-01 RX ORDER — DEXTROSE 50 % IN WATER 50 %
25 SYRINGE (ML) INTRAVENOUS ONCE
Refills: 0 | Status: DISCONTINUED | OUTPATIENT
Start: 2021-01-01 | End: 2021-01-01

## 2021-01-01 RX ORDER — NOREPINEPHRINE BITARTRATE/D5W 8 MG/250ML
0.05 PLASTIC BAG, INJECTION (ML) INTRAVENOUS
Qty: 16 | Refills: 0 | Status: DISCONTINUED | OUTPATIENT
Start: 2021-01-01 | End: 2021-01-01

## 2021-01-01 RX ORDER — ALBUMIN HUMAN 25 %
50 VIAL (ML) INTRAVENOUS EVERY 8 HOURS
Refills: 0 | Status: COMPLETED | OUTPATIENT
Start: 2021-01-01 | End: 2021-01-01

## 2021-01-01 RX ORDER — INSULIN LISPRO 100/ML
VIAL (ML) SUBCUTANEOUS EVERY 6 HOURS
Refills: 0 | Status: DISCONTINUED | OUTPATIENT
Start: 2021-01-01 | End: 2021-01-01

## 2021-01-01 RX ORDER — FINASTERIDE 5 MG/1
0 TABLET, FILM COATED ORAL
Qty: 0 | Refills: 0 | DISCHARGE

## 2021-01-01 RX ORDER — FENTANYL CITRATE 50 UG/ML
200 INJECTION INTRAVENOUS ONCE
Refills: 0 | Status: DISCONTINUED | OUTPATIENT
Start: 2021-01-01 | End: 2021-01-01

## 2021-01-01 RX ORDER — CEFTRIAXONE 500 MG/1
1000 INJECTION, POWDER, FOR SOLUTION INTRAMUSCULAR; INTRAVENOUS EVERY 24 HOURS
Refills: 0 | Status: DISCONTINUED | OUTPATIENT
Start: 2021-01-01 | End: 2021-01-01

## 2021-01-01 RX ORDER — KETAMINE HYDROCHLORIDE 100 MG/ML
0.25 INJECTION INTRAMUSCULAR; INTRAVENOUS
Qty: 1000 | Refills: 0 | Status: DISCONTINUED | OUTPATIENT
Start: 2021-01-01 | End: 2021-01-01

## 2021-01-01 RX ORDER — SOD SULF/SODIUM/NAHCO3/KCL/PEG
2000 SOLUTION, RECONSTITUTED, ORAL ORAL ONCE
Refills: 0 | Status: COMPLETED | OUTPATIENT
Start: 2021-01-01 | End: 2021-01-01

## 2021-01-01 RX ORDER — TAMSULOSIN HYDROCHLORIDE 0.4 MG/1
0.4 CAPSULE ORAL AT BEDTIME
Refills: 0 | Status: DISCONTINUED | OUTPATIENT
Start: 2021-01-01 | End: 2021-01-01

## 2021-01-01 RX ORDER — TAMSULOSIN HYDROCHLORIDE 0.4 MG/1
0 CAPSULE ORAL
Qty: 0 | Refills: 0 | DISCHARGE

## 2021-01-01 RX ORDER — NOREPINEPHRINE BITARTRATE/D5W 8 MG/250ML
0.4 PLASTIC BAG, INJECTION (ML) INTRAVENOUS
Qty: 32 | Refills: 0 | Status: DISCONTINUED | OUTPATIENT
Start: 2021-01-01 | End: 2021-01-01

## 2021-01-01 RX ORDER — MIDAZOLAM HYDROCHLORIDE 1 MG/ML
4 INJECTION, SOLUTION INTRAMUSCULAR; INTRAVENOUS ONCE
Refills: 0 | Status: DISCONTINUED | OUTPATIENT
Start: 2021-01-01 | End: 2021-01-01

## 2021-01-01 RX ORDER — ATORVASTATIN CALCIUM 80 MG/1
20 TABLET, FILM COATED ORAL AT BEDTIME
Refills: 0 | Status: DISCONTINUED | OUTPATIENT
Start: 2021-01-01 | End: 2021-01-01

## 2021-01-01 RX ORDER — DEXTROSE 50 % IN WATER 50 %
50 SYRINGE (ML) INTRAVENOUS ONCE
Refills: 0 | Status: COMPLETED | OUTPATIENT
Start: 2021-01-01 | End: 2021-01-01

## 2021-01-01 RX ORDER — MEROPENEM 1 G/30ML
500 INJECTION INTRAVENOUS ONCE
Refills: 0 | Status: COMPLETED | OUTPATIENT
Start: 2021-01-01 | End: 2021-01-01

## 2021-01-01 RX ORDER — PANTOPRAZOLE SODIUM 20 MG/1
8 TABLET, DELAYED RELEASE ORAL
Qty: 80 | Refills: 0 | Status: DISCONTINUED | OUTPATIENT
Start: 2021-01-01 | End: 2021-01-01

## 2021-01-01 RX ORDER — ACETAMINOPHEN 500 MG
650 TABLET ORAL ONCE
Refills: 0 | Status: DISCONTINUED | OUTPATIENT
Start: 2021-01-01 | End: 2021-01-01

## 2021-01-01 RX ORDER — SUCRALFATE 1 G
1 TABLET ORAL
Qty: 0 | Refills: 0 | DISCHARGE

## 2021-01-01 RX ORDER — MEROPENEM 1 G/30ML
500 INJECTION INTRAVENOUS EVERY 12 HOURS
Refills: 0 | Status: DISCONTINUED | OUTPATIENT
Start: 2021-01-01 | End: 2021-01-01

## 2021-01-01 RX ORDER — MIDAZOLAM HYDROCHLORIDE 1 MG/ML
1 INJECTION, SOLUTION INTRAMUSCULAR; INTRAVENOUS ONCE
Refills: 0 | Status: DISCONTINUED | OUTPATIENT
Start: 2021-01-01 | End: 2021-01-01

## 2021-01-01 RX ORDER — DEXMEDETOMIDINE HYDROCHLORIDE IN 0.9% SODIUM CHLORIDE 4 UG/ML
0.2 INJECTION INTRAVENOUS
Qty: 200 | Refills: 0 | Status: DISCONTINUED | OUTPATIENT
Start: 2021-01-01 | End: 2021-01-01

## 2021-01-01 RX ORDER — FOLIC ACID 0.8 MG
0 TABLET ORAL
Qty: 0 | Refills: 0 | DISCHARGE

## 2021-01-01 RX ORDER — SODIUM CHLORIDE 9 MG/ML
10 INJECTION INTRAMUSCULAR; INTRAVENOUS; SUBCUTANEOUS
Refills: 0 | Status: DISCONTINUED | OUTPATIENT
Start: 2021-01-01 | End: 2021-01-01

## 2021-01-01 RX ORDER — FENTANYL CITRATE 50 UG/ML
0.5 INJECTION INTRAVENOUS
Qty: 5000 | Refills: 0 | Status: DISCONTINUED | OUTPATIENT
Start: 2021-01-01 | End: 2021-01-01

## 2021-01-01 RX ORDER — PHENYLEPHRINE HYDROCHLORIDE 10 MG/ML
2 INJECTION INTRAVENOUS
Qty: 160 | Refills: 0 | Status: DISCONTINUED | OUTPATIENT
Start: 2021-01-01 | End: 2021-01-01

## 2021-01-01 RX ADMIN — KETAMINE HYDROCHLORIDE 2.25 MG/KG/HR: 100 INJECTION INTRAMUSCULAR; INTRAVENOUS at 11:45

## 2021-01-01 RX ADMIN — KETAMINE HYDROCHLORIDE 2.25 MG/KG/HR: 100 INJECTION INTRAMUSCULAR; INTRAVENOUS at 03:00

## 2021-01-01 RX ADMIN — CHLORHEXIDINE GLUCONATE 1 APPLICATION(S): 213 SOLUTION TOPICAL at 06:13

## 2021-01-01 RX ADMIN — Medication 25 MILLILITER(S): at 04:00

## 2021-01-01 RX ADMIN — CHLORHEXIDINE GLUCONATE 1 APPLICATION(S): 213 SOLUTION TOPICAL at 05:21

## 2021-01-01 RX ADMIN — PANTOPRAZOLE SODIUM 10 MG/HR: 20 TABLET, DELAYED RELEASE ORAL at 22:08

## 2021-01-01 RX ADMIN — PANTOPRAZOLE SODIUM 40 MILLIGRAM(S): 20 TABLET, DELAYED RELEASE ORAL at 18:00

## 2021-01-01 RX ADMIN — DEXMEDETOMIDINE HYDROCHLORIDE IN 0.9% SODIUM CHLORIDE 11.3 MICROGRAM(S)/KG/HR: 4 INJECTION INTRAVENOUS at 18:15

## 2021-01-01 RX ADMIN — CHLORHEXIDINE GLUCONATE 1 APPLICATION(S): 213 SOLUTION TOPICAL at 05:16

## 2021-01-01 RX ADMIN — Medication 1: at 17:26

## 2021-01-01 RX ADMIN — PANTOPRAZOLE SODIUM 40 MILLIGRAM(S): 20 TABLET, DELAYED RELEASE ORAL at 06:00

## 2021-01-01 RX ADMIN — Medication 4.22 MICROGRAM(S)/KG/MIN: at 10:20

## 2021-01-01 RX ADMIN — DEXMEDETOMIDINE HYDROCHLORIDE IN 0.9% SODIUM CHLORIDE 4.51 MICROGRAM(S)/KG/HR: 4 INJECTION INTRAVENOUS at 10:21

## 2021-01-01 RX ADMIN — Medication 1: at 17:45

## 2021-01-01 RX ADMIN — CHLORHEXIDINE GLUCONATE 15 MILLILITER(S): 213 SOLUTION TOPICAL at 05:10

## 2021-01-01 RX ADMIN — PANTOPRAZOLE SODIUM 10 MG/HR: 20 TABLET, DELAYED RELEASE ORAL at 18:52

## 2021-01-01 RX ADMIN — KETAMINE HYDROCHLORIDE 2.25 MG/KG/HR: 100 INJECTION INTRAMUSCULAR; INTRAVENOUS at 07:42

## 2021-01-01 RX ADMIN — KETAMINE HYDROCHLORIDE 2.25 MG/KG/HR: 100 INJECTION INTRAMUSCULAR; INTRAVENOUS at 23:35

## 2021-01-01 RX ADMIN — FENTANYL CITRATE 100 MICROGRAM(S): 50 INJECTION INTRAVENOUS at 11:43

## 2021-01-01 RX ADMIN — VASOPRESSIN 2.4 UNIT(S)/MIN: 20 INJECTION INTRAVENOUS at 05:10

## 2021-01-01 RX ADMIN — Medication 1000 MILLIGRAM(S): at 05:08

## 2021-01-01 RX ADMIN — FENTANYL CITRATE 100 MICROGRAM(S): 50 INJECTION INTRAVENOUS at 15:00

## 2021-01-01 RX ADMIN — CHLORHEXIDINE GLUCONATE 1 APPLICATION(S): 213 SOLUTION TOPICAL at 05:10

## 2021-01-01 RX ADMIN — KETAMINE HYDROCHLORIDE 2.25 MG/KG/HR: 100 INJECTION INTRAMUSCULAR; INTRAVENOUS at 13:37

## 2021-01-01 RX ADMIN — Medication 8.45 MICROGRAM(S)/KG/MIN: at 15:30

## 2021-01-01 RX ADMIN — KETAMINE HYDROCHLORIDE 2.25 MG/KG/HR: 100 INJECTION INTRAMUSCULAR; INTRAVENOUS at 23:02

## 2021-01-01 RX ADMIN — MIDAZOLAM HYDROCHLORIDE 2 MILLIGRAM(S): 1 INJECTION, SOLUTION INTRAMUSCULAR; INTRAVENOUS at 18:00

## 2021-01-01 RX ADMIN — CHLORHEXIDINE GLUCONATE 15 MILLILITER(S): 213 SOLUTION TOPICAL at 05:17

## 2021-01-01 RX ADMIN — KETAMINE HYDROCHLORIDE 2.25 MG/KG/HR: 100 INJECTION INTRAMUSCULAR; INTRAVENOUS at 19:45

## 2021-01-01 RX ADMIN — KETAMINE HYDROCHLORIDE 2.25 MG/KG/HR: 100 INJECTION INTRAMUSCULAR; INTRAVENOUS at 22:52

## 2021-01-01 RX ADMIN — CHLORHEXIDINE GLUCONATE 1 APPLICATION(S): 213 SOLUTION TOPICAL at 05:27

## 2021-01-01 RX ADMIN — FENTANYL CITRATE 100 MICROGRAM(S): 50 INJECTION INTRAVENOUS at 11:44

## 2021-01-01 RX ADMIN — Medication 200 GRAM(S): at 09:02

## 2021-01-01 RX ADMIN — PIPERACILLIN AND TAZOBACTAM 25 GRAM(S): 4; .5 INJECTION, POWDER, LYOPHILIZED, FOR SOLUTION INTRAVENOUS at 06:01

## 2021-01-01 RX ADMIN — CHLORHEXIDINE GLUCONATE 15 MILLILITER(S): 213 SOLUTION TOPICAL at 17:34

## 2021-01-01 RX ADMIN — CHLORHEXIDINE GLUCONATE 15 MILLILITER(S): 213 SOLUTION TOPICAL at 17:47

## 2021-01-01 RX ADMIN — PANTOPRAZOLE SODIUM 10 MG/HR: 20 TABLET, DELAYED RELEASE ORAL at 01:22

## 2021-01-01 RX ADMIN — Medication 8.45 MICROGRAM(S)/KG/MIN: at 15:00

## 2021-01-01 RX ADMIN — CHLORHEXIDINE GLUCONATE 1 APPLICATION(S): 213 SOLUTION TOPICAL at 05:00

## 2021-01-01 RX ADMIN — Medication 1: at 17:02

## 2021-01-01 RX ADMIN — Medication 50 MILLILITER(S): at 10:16

## 2021-01-01 RX ADMIN — DEXMEDETOMIDINE HYDROCHLORIDE IN 0.9% SODIUM CHLORIDE 11.3 MICROGRAM(S)/KG/HR: 4 INJECTION INTRAVENOUS at 23:30

## 2021-01-01 RX ADMIN — FENTANYL CITRATE 200 MICROGRAM(S): 50 INJECTION INTRAVENOUS at 19:48

## 2021-01-01 RX ADMIN — Medication 2: at 00:18

## 2021-01-01 RX ADMIN — Medication 50 MILLILITER(S): at 17:50

## 2021-01-01 RX ADMIN — Medication 4.22 MICROGRAM(S)/KG/MIN: at 11:24

## 2021-01-01 RX ADMIN — KETAMINE HYDROCHLORIDE 2.25 MG/KG/HR: 100 INJECTION INTRAMUSCULAR; INTRAVENOUS at 08:52

## 2021-01-01 RX ADMIN — Medication 1 MILLIGRAM(S): at 22:00

## 2021-01-01 RX ADMIN — Medication 1 PACKET(S): at 10:23

## 2021-01-01 RX ADMIN — Medication 1000 MILLIGRAM(S): at 19:51

## 2021-01-01 RX ADMIN — Medication 1000 MILLIGRAM(S): at 09:19

## 2021-01-01 RX ADMIN — Medication 400 MILLIGRAM(S): at 03:45

## 2021-01-01 RX ADMIN — MEROPENEM 100 MILLIGRAM(S): 1 INJECTION INTRAVENOUS at 20:03

## 2021-01-01 RX ADMIN — FENTANYL CITRATE 2.25 MICROGRAM(S)/KG/HR: 50 INJECTION INTRAVENOUS at 08:38

## 2021-01-01 RX ADMIN — Medication 1 PACKET(S): at 02:30

## 2021-01-01 RX ADMIN — PANTOPRAZOLE SODIUM 40 MILLIGRAM(S): 20 TABLET, DELAYED RELEASE ORAL at 17:34

## 2021-01-01 RX ADMIN — Medication 50 MILLIEQUIVALENT(S): at 21:05

## 2021-01-01 RX ADMIN — CHLORHEXIDINE GLUCONATE 1 APPLICATION(S): 213 SOLUTION TOPICAL at 05:02

## 2021-01-01 RX ADMIN — Medication 200 GRAM(S): at 12:51

## 2021-01-01 RX ADMIN — FENTANYL CITRATE 2.25 MICROGRAM(S)/KG/HR: 50 INJECTION INTRAVENOUS at 01:24

## 2021-01-01 RX ADMIN — VASOPRESSIN 2.4 UNIT(S)/MIN: 20 INJECTION INTRAVENOUS at 04:00

## 2021-01-01 RX ADMIN — Medication 30 MILLILITER(S): at 08:00

## 2021-01-01 RX ADMIN — FENTANYL CITRATE 2.25 MICROGRAM(S)/KG/HR: 50 INJECTION INTRAVENOUS at 09:17

## 2021-01-01 RX ADMIN — Medication 8.45 MICROGRAM(S)/KG/MIN: at 12:36

## 2021-01-01 RX ADMIN — Medication 8.45 MICROGRAM(S)/KG/MIN: at 10:24

## 2021-01-01 RX ADMIN — Medication 4.22 MICROGRAM(S)/KG/MIN: at 03:05

## 2021-01-01 RX ADMIN — FENTANYL CITRATE 100 MICROGRAM(S): 50 INJECTION INTRAVENOUS at 10:36

## 2021-01-01 RX ADMIN — Medication 50 MILLIEQUIVALENT(S): at 21:10

## 2021-01-01 RX ADMIN — MIDAZOLAM HYDROCHLORIDE 1 MILLIGRAM(S): 1 INJECTION, SOLUTION INTRAMUSCULAR; INTRAVENOUS at 14:45

## 2021-01-01 RX ADMIN — KETAMINE HYDROCHLORIDE 60 MILLIGRAM(S): 100 INJECTION INTRAMUSCULAR; INTRAVENOUS at 19:47

## 2021-01-01 RX ADMIN — CHLORHEXIDINE GLUCONATE 1 APPLICATION(S): 213 SOLUTION TOPICAL at 16:41

## 2021-01-01 RX ADMIN — SODIUM CHLORIDE 500 MILLILITER(S): 9 INJECTION, SOLUTION INTRAVENOUS at 12:12

## 2021-01-01 RX ADMIN — PHENYLEPHRINE HYDROCHLORIDE 20.3 MICROGRAM(S)/KG/MIN: 10 INJECTION INTRAVENOUS at 04:47

## 2021-01-01 RX ADMIN — DEXMEDETOMIDINE HYDROCHLORIDE IN 0.9% SODIUM CHLORIDE 11.3 MICROGRAM(S)/KG/HR: 4 INJECTION INTRAVENOUS at 23:06

## 2021-01-01 RX ADMIN — Medication 1000 MILLIGRAM(S): at 12:40

## 2021-01-01 RX ADMIN — Medication 50 MILLIEQUIVALENT(S): at 19:47

## 2021-01-01 RX ADMIN — Medication 1: at 06:00

## 2021-01-01 RX ADMIN — PHENYLEPHRINE HYDROCHLORIDE 33.8 MICROGRAM(S)/KG/MIN: 10 INJECTION INTRAVENOUS at 02:15

## 2021-01-01 RX ADMIN — KETAMINE HYDROCHLORIDE 2.25 MG/KG/HR: 100 INJECTION INTRAMUSCULAR; INTRAVENOUS at 04:00

## 2021-01-01 RX ADMIN — Medication 4.22 MICROGRAM(S)/KG/MIN: at 01:21

## 2021-01-01 RX ADMIN — FENTANYL CITRATE 200 MICROGRAM(S): 50 INJECTION INTRAVENOUS at 20:32

## 2021-01-01 RX ADMIN — Medication 4.22 MICROGRAM(S)/KG/MIN: at 09:17

## 2021-01-01 RX ADMIN — Medication 8.45 MICROGRAM(S)/KG/MIN: at 18:58

## 2021-01-01 RX ADMIN — PIPERACILLIN AND TAZOBACTAM 200 GRAM(S): 4; .5 INJECTION, POWDER, LYOPHILIZED, FOR SOLUTION INTRAVENOUS at 23:05

## 2021-01-01 RX ADMIN — DEXMEDETOMIDINE HYDROCHLORIDE IN 0.9% SODIUM CHLORIDE 11.3 MICROGRAM(S)/KG/HR: 4 INJECTION INTRAVENOUS at 15:45

## 2021-01-01 RX ADMIN — CHLORHEXIDINE GLUCONATE 1 APPLICATION(S): 213 SOLUTION TOPICAL at 06:00

## 2021-01-01 RX ADMIN — Medication 4.22 MICROGRAM(S)/KG/MIN: at 22:48

## 2021-01-01 RX ADMIN — KETAMINE HYDROCHLORIDE 2.25 MG/KG/HR: 100 INJECTION INTRAMUSCULAR; INTRAVENOUS at 21:04

## 2021-01-01 RX ADMIN — Medication 50 MILLILITER(S): at 23:02

## 2021-01-01 RX ADMIN — Medication 200 GRAM(S): at 10:18

## 2021-01-01 RX ADMIN — CHLORHEXIDINE GLUCONATE 1 APPLICATION(S): 213 SOLUTION TOPICAL at 05:09

## 2021-01-01 RX ADMIN — SODIUM CHLORIDE 1000 MILLILITER(S): 9 INJECTION, SOLUTION INTRAVENOUS at 08:50

## 2021-01-01 RX ADMIN — Medication 25 MILLILITER(S): at 17:42

## 2021-01-01 RX ADMIN — CHLORHEXIDINE GLUCONATE 15 MILLILITER(S): 213 SOLUTION TOPICAL at 05:27

## 2021-01-01 RX ADMIN — KETAMINE HYDROCHLORIDE 2.25 MG/KG/HR: 100 INJECTION INTRAMUSCULAR; INTRAVENOUS at 21:30

## 2021-01-01 RX ADMIN — Medication 30 MILLILITER(S): at 04:15

## 2021-01-01 RX ADMIN — CHLORHEXIDINE GLUCONATE 15 MILLILITER(S): 213 SOLUTION TOPICAL at 17:01

## 2021-01-01 RX ADMIN — KETAMINE HYDROCHLORIDE 2.25 MG/KG/HR: 100 INJECTION INTRAMUSCULAR; INTRAVENOUS at 19:48

## 2021-01-01 RX ADMIN — DEXMEDETOMIDINE HYDROCHLORIDE IN 0.9% SODIUM CHLORIDE 11.3 MICROGRAM(S)/KG/HR: 4 INJECTION INTRAVENOUS at 20:45

## 2021-01-01 RX ADMIN — Medication 50 MILLILITER(S): at 16:18

## 2021-01-01 RX ADMIN — Medication 50 MILLILITER(S): at 02:20

## 2021-01-01 RX ADMIN — KETAMINE HYDROCHLORIDE 2.25 MG/KG/HR: 100 INJECTION INTRAMUSCULAR; INTRAVENOUS at 01:21

## 2021-01-01 RX ADMIN — MIDAZOLAM HYDROCHLORIDE 2 MILLIGRAM(S): 1 INJECTION, SOLUTION INTRAMUSCULAR; INTRAVENOUS at 20:15

## 2021-01-01 RX ADMIN — Medication 50 GRAM(S): at 23:35

## 2021-01-01 RX ADMIN — PANTOPRAZOLE SODIUM 40 MILLIGRAM(S): 20 TABLET, DELAYED RELEASE ORAL at 05:17

## 2021-01-01 RX ADMIN — KETAMINE HYDROCHLORIDE 2.25 MG/KG/HR: 100 INJECTION INTRAMUSCULAR; INTRAVENOUS at 15:45

## 2021-01-01 RX ADMIN — SODIUM CHLORIDE 75 MILLILITER(S): 9 INJECTION, SOLUTION INTRAVENOUS at 02:04

## 2021-01-01 RX ADMIN — DEXMEDETOMIDINE HYDROCHLORIDE IN 0.9% SODIUM CHLORIDE 11.3 MICROGRAM(S)/KG/HR: 4 INJECTION INTRAVENOUS at 01:14

## 2021-01-01 RX ADMIN — Medication 400 MILLIGRAM(S): at 08:23

## 2021-01-01 RX ADMIN — Medication 8.45 MICROGRAM(S)/KG/MIN: at 03:10

## 2021-01-01 RX ADMIN — Medication 2: at 20:27

## 2021-01-01 RX ADMIN — Medication 50 MILLILITER(S): at 23:20

## 2021-01-01 RX ADMIN — KETAMINE HYDROCHLORIDE 2.25 MG/KG/HR: 100 INJECTION INTRAMUSCULAR; INTRAVENOUS at 09:45

## 2021-01-01 RX ADMIN — Medication 50 GRAM(S): at 22:17

## 2021-01-01 RX ADMIN — KETAMINE HYDROCHLORIDE 2.25 MG/KG/HR: 100 INJECTION INTRAMUSCULAR; INTRAVENOUS at 00:30

## 2021-01-01 RX ADMIN — KETAMINE HYDROCHLORIDE 2.25 MG/KG/HR: 100 INJECTION INTRAMUSCULAR; INTRAVENOUS at 12:38

## 2021-01-01 RX ADMIN — Medication 30 MILLILITER(S): at 05:00

## 2021-01-01 RX ADMIN — KETAMINE HYDROCHLORIDE 2.25 MG/KG/HR: 100 INJECTION INTRAMUSCULAR; INTRAVENOUS at 09:54

## 2021-01-01 RX ADMIN — Medication 50 MILLILITER(S): at 21:04

## 2021-01-01 RX ADMIN — Medication 1: at 12:13

## 2021-01-01 RX ADMIN — KETAMINE HYDROCHLORIDE 2.25 MG/KG/HR: 100 INJECTION INTRAMUSCULAR; INTRAVENOUS at 02:25

## 2021-01-01 RX ADMIN — Medication 8.45 MICROGRAM(S)/KG/MIN: at 20:22

## 2021-01-01 RX ADMIN — CHLORHEXIDINE GLUCONATE 15 MILLILITER(S): 213 SOLUTION TOPICAL at 05:12

## 2021-01-01 RX ADMIN — Medication 4.22 MICROGRAM(S)/KG/MIN: at 04:10

## 2021-01-01 RX ADMIN — POTASSIUM PHOSPHATE, MONOBASIC POTASSIUM PHOSPHATE, DIBASIC 83.33 MILLIMOLE(S): 236; 224 INJECTION, SOLUTION INTRAVENOUS at 14:11

## 2021-01-01 RX ADMIN — Medication 30 MILLILITER(S): at 09:00

## 2021-01-01 RX ADMIN — Medication 30 MILLILITER(S): at 06:00

## 2021-01-01 RX ADMIN — MEROPENEM 100 MILLIGRAM(S): 1 INJECTION INTRAVENOUS at 09:17

## 2021-01-01 RX ADMIN — Medication 8.45 MICROGRAM(S)/KG/MIN: at 17:50

## 2021-01-01 RX ADMIN — Medication 30 MILLILITER(S): at 09:34

## 2021-01-01 RX ADMIN — Medication 8.45 MICROGRAM(S)/KG/MIN: at 04:21

## 2021-01-01 RX ADMIN — Medication 1 PACKET(S): at 12:19

## 2021-01-01 RX ADMIN — Medication 400 MILLIGRAM(S): at 12:16

## 2021-01-01 RX ADMIN — CHLORHEXIDINE GLUCONATE 15 MILLILITER(S): 213 SOLUTION TOPICAL at 05:00

## 2021-01-01 RX ADMIN — Medication 30 MILLILITER(S): at 22:10

## 2021-01-01 RX ADMIN — CHLORHEXIDINE GLUCONATE 15 MILLILITER(S): 213 SOLUTION TOPICAL at 06:00

## 2021-01-01 RX ADMIN — KETAMINE HYDROCHLORIDE 2.25 MG/KG/HR: 100 INJECTION INTRAMUSCULAR; INTRAVENOUS at 16:37

## 2021-01-01 RX ADMIN — CEFTRIAXONE 100 MILLIGRAM(S): 500 INJECTION, POWDER, FOR SOLUTION INTRAMUSCULAR; INTRAVENOUS at 10:54

## 2021-01-01 RX ADMIN — Medication 30 MILLILITER(S): at 07:00

## 2021-01-01 RX ADMIN — MIDAZOLAM HYDROCHLORIDE 4 MILLIGRAM(S): 1 INJECTION, SOLUTION INTRAMUSCULAR; INTRAVENOUS at 19:47

## 2021-01-01 RX ADMIN — CHLORHEXIDINE GLUCONATE 15 MILLILITER(S): 213 SOLUTION TOPICAL at 18:00

## 2021-01-01 RX ADMIN — FENTANYL CITRATE 100 MICROGRAM(S): 50 INJECTION INTRAVENOUS at 10:19

## 2021-01-01 RX ADMIN — MEROPENEM 100 MILLIGRAM(S): 1 INJECTION INTRAVENOUS at 08:29

## 2021-01-01 RX ADMIN — PANTOPRAZOLE SODIUM 40 MILLIGRAM(S): 20 TABLET, DELAYED RELEASE ORAL at 18:39

## 2021-01-01 RX ADMIN — MEROPENEM 100 MILLIGRAM(S): 1 INJECTION INTRAVENOUS at 20:21

## 2021-01-01 RX ADMIN — Medication 20 MILLIEQUIVALENT(S): at 12:38

## 2021-01-01 RX ADMIN — VASOPRESSIN 2.4 UNIT(S)/MIN: 20 INJECTION INTRAVENOUS at 03:01

## 2021-01-01 RX ADMIN — FENTANYL CITRATE 2.25 MICROGRAM(S)/KG/HR: 50 INJECTION INTRAVENOUS at 03:00

## 2021-01-01 RX ADMIN — VASOPRESSIN 2.4 UNIT(S)/MIN: 20 INJECTION INTRAVENOUS at 01:22

## 2021-01-01 RX ADMIN — MIDAZOLAM HYDROCHLORIDE 2 MILLIGRAM(S): 1 INJECTION, SOLUTION INTRAMUSCULAR; INTRAVENOUS at 14:44

## 2021-01-01 RX ADMIN — CHLORHEXIDINE GLUCONATE 15 MILLILITER(S): 213 SOLUTION TOPICAL at 18:39

## 2021-01-01 RX ADMIN — Medication 2000 MILLILITER(S): at 12:37

## 2021-01-01 RX ADMIN — MEROPENEM 100 MILLIGRAM(S): 1 INJECTION INTRAVENOUS at 08:39

## 2021-01-01 RX ADMIN — CHLORHEXIDINE GLUCONATE 15 MILLILITER(S): 213 SOLUTION TOPICAL at 05:19

## 2021-01-01 RX ADMIN — KETAMINE HYDROCHLORIDE 2.25 MG/KG/HR: 100 INJECTION INTRAMUSCULAR; INTRAVENOUS at 16:19

## 2021-01-01 RX ADMIN — Medication 8.45 MICROGRAM(S)/KG/MIN: at 19:00

## 2021-01-01 RX ADMIN — DEXMEDETOMIDINE HYDROCHLORIDE IN 0.9% SODIUM CHLORIDE 11.3 MICROGRAM(S)/KG/HR: 4 INJECTION INTRAVENOUS at 04:45

## 2021-01-01 RX ADMIN — Medication 50 MILLILITER(S): at 21:45

## 2021-01-01 RX ADMIN — Medication 250 MILLIGRAM(S): at 09:51

## 2021-01-01 RX ADMIN — Medication 100 MEQ/KG/HR: at 21:34

## 2021-01-01 RX ADMIN — KETAMINE HYDROCHLORIDE 2.25 MG/KG/HR: 100 INJECTION INTRAMUSCULAR; INTRAVENOUS at 07:04

## 2021-01-01 RX ADMIN — PANTOPRAZOLE SODIUM 40 MILLIGRAM(S): 20 TABLET, DELAYED RELEASE ORAL at 17:02

## 2021-01-01 RX ADMIN — KETAMINE HYDROCHLORIDE 2.25 MG/KG/HR: 100 INJECTION INTRAMUSCULAR; INTRAVENOUS at 20:22

## 2021-01-01 RX ADMIN — PANTOPRAZOLE SODIUM 40 MILLIGRAM(S): 20 TABLET, DELAYED RELEASE ORAL at 06:21

## 2021-01-01 RX ADMIN — Medication 8.45 MICROGRAM(S)/KG/MIN: at 22:28

## 2021-01-01 RX ADMIN — Medication 8.45 MICROGRAM(S)/KG/MIN: at 14:14

## 2021-01-01 RX ADMIN — Medication 8.45 MICROGRAM(S)/KG/MIN: at 19:48

## 2021-01-01 RX ADMIN — PANTOPRAZOLE SODIUM 10 MG/HR: 20 TABLET, DELAYED RELEASE ORAL at 05:02

## 2021-01-01 RX ADMIN — Medication 4.22 MICROGRAM(S)/KG/MIN: at 16:39

## 2021-01-01 RX ADMIN — KETAMINE HYDROCHLORIDE 2.25 MG/KG/HR: 100 INJECTION INTRAMUSCULAR; INTRAVENOUS at 06:53

## 2021-01-01 RX ADMIN — DEXMEDETOMIDINE HYDROCHLORIDE IN 0.9% SODIUM CHLORIDE 11.3 MICROGRAM(S)/KG/HR: 4 INJECTION INTRAVENOUS at 05:10

## 2021-01-01 RX ADMIN — Medication 200 GRAM(S): at 19:57

## 2021-01-01 RX ADMIN — CHLORHEXIDINE GLUCONATE 15 MILLILITER(S): 213 SOLUTION TOPICAL at 17:07

## 2021-01-01 RX ADMIN — FENTANYL CITRATE 100 MICROGRAM(S): 50 INJECTION INTRAVENOUS at 14:44

## 2021-01-01 RX ADMIN — Medication 8.45 MICROGRAM(S)/KG/MIN: at 08:52

## 2021-06-16 NOTE — H&P ADULT - ATTENDING COMMENTS
Patient seen and examined.  Agree with resident note as above.  Patient with hx as noted including dementia, recent perforated duodenal ulcer s/p amy patch and jtube, with episodes of recurrent bleeding, who presented to [facility] with another episode of acute bleeding and worsening anemia (one day after his most recent discharge).  While at [facility] patient had anemia and relative hypoTN that was refractory to blood resuscitation - hgb dropped further s/p 2U PRBC.  Now accepted to [facility] for further care and evaluation by advanced GI and IR.  3U PRBC total given at [facility].    On arrival to [facility] MICU patient had a large melenic stool, but remains alert and BP stable.  Plan to maintain 2 large IVs, serial CBC, transfuse to maintain hgb>7.5, INR<1.3, plt>50.  Protonix gtt.  GI consulted and they will come in the AM.  Appreciate surgical consultation.  NPO.  Patient will likely require endoscopy and possibly IR embolization.  Full plan as above and I have edited as appropriate.

## 2021-06-16 NOTE — H&P ADULT - NSHPREVIEWOFSYSTEMS_GEN_ALL_CORE
Unable as pt non-verbal at baseline. CONSTITUTIONAL: No fever, no chills  EYES: No eye pain, no visual disturbance  Mouth: no pain in mouth, no cuts  RESPIRATORY: No cough, No sob  CARDIOVASCULAR: No CP, no palpitations  GASTROINTESTINAL: no abdominal pain, no n/v/d  GENITOURINARY: No dysuria, no hematuria  NEUROLOGICAL: No headaches, no weakness  SKIN: No itching, no rashes  MUSCULOSKELETAL: No joint pain, no joint swelling  PSYCHIATRY: no insomnia, no irritability

## 2021-06-16 NOTE — H&P ADULT - HISTORY OF PRESENT ILLNESS
Phone call to patient.  She is requesting f/u appt with dr denson s/p carotid US.    Advised we are waiting for dr denson to open his schedule on Friday, 7/17/20 and we will call her back for appt.    Routed message to PSR.  Please schedule appt with dr denson on 7/17/20 am when schedule is opened.   Thank you.    81M PMH thrombocytopenia, anemia, AAA, SVT s/p IVC filter, duodenal perf 4/28/21 s/p amy patch and J-tube, DM, HTN, HLD admitted to [place] from 6/3 to 6/15 81M PMH thrombocytopenia, anemia, AAA, DVT s/p IVC filter, duodenal perforation 4/28/21 s/p amy patch plication and J-tube, DM, HTN, HLD, recent admission for GIB now presenting again for repeat GIB. Pt first presented on 6/3/21 for SOB and AMS and was admitted to [place] from 6/3 to 6/15 to the ICU for symptomatic anemia and GI bleeding. EGD performed during that admission did not show any active bleeding nor blood in esophagus, stomach, or duodenum but did show a large deep ulcer in the anterior duodenal bulb and large flat ulcer over the posterior duodenal bulb without visible vessel. He also had a tagged RBC scan which was negative. He did not have any further episodes of bleeding and was discharged on 6/15 with a Hgb of 8.4. Pt returned later on the same day 6/15 with repeat GIB and was found to have a Hgb of 6.4. CTA A/P w/o active bleeding. Pt received 2u pRBC however Hgb did not improve (repeat 6.2). He was transfused an additional unit and transferred to [facility] for further management.  81M PMH thrombocytopenia, anemia, AAA, DVT s/p IVC filter, duodenal perforation 4/28/21 s/p amy patch plication and J-tube, DM, HTN, HLD, severe dementia (AAOx1 at baseline) recent admission for GIB now presenting again for repeat GIB. Pt first presented on 6/3/21 for SOB and AMS and was admitted to [place] from 6/3 to 6/15 to the ICU for symptomatic anemia and GI bleeding. EGD performed during that admission did not show any active bleeding nor blood in esophagus, stomach, or duodenum but did show a large deep ulcer in the anterior duodenal bulb and large flat ulcer over the posterior duodenal bulb without visible vessel. He also had a tagged RBC scan which was negative. He did not have any further episodes of bleeding and was discharged on 6/15 with a Hgb of 8.4. Pt returned later on the same day 6/15 with repeat GIB and was found to have a Hgb of 6.4. CTA A/P w/o active bleeding. Pt received 2u pRBC however Hgb did not improve (repeat 6.2). He was transfused an additional unit and transferred to [facility] for further management.  81M PMH thrombocytopenia, anemia, AAA, DVT s/p IVC filter, duodenal perforation 4/28/21 s/p amy patch plication and J-tube, DM, HTN, HLD, severe dementia (AAOx1 at baseline) recent admission for GIB now presenting again for repeat GIB. Pt first presented on 6/3/21 for SOB and AMS and was admitted to [place] from 6/3 to 6/15 to the ICU for symptomatic anemia and GI bleeding. EGD performed during that admission did not show any active bleeding nor blood in esophagus, stomach, or duodenum but did show a large deep ulcer in the anterior duodenal bulb and large flat ulcer over the posterior duodenal bulb without visible vessel. He also had a tagged RBC scan which was negative. He did not have any further episodes of bleeding and was discharged on 6/15 with a Hgb of 8.4. Pt returned later on the same day 6/15 with repeat GIB and was found to have a Hgb of 6.4. CTA A/P w/o active bleeding. Pt received 2u pRBC however Hgb did not improve (repeat 6.2). He was transfused an additional unit (3u pRBC total) and transferred to [facility] for further management.  81M PMH thrombocytopenia, anemia, AAA, DVT s/p IVC filter, duodenal perforation 4/28/21 s/p amy patch plication and J-tube, H.pylori s/p triple therapy, DM, HTN, HLD, severe dementia (AAOx1 at baseline) recent admission for GIB now presenting again for repeat GIB. Pt first presented on 6/3/21 for SOB and AMS and was admitted to [place] from 6/3 to 6/15 to the ICU for symptomatic anemia and GI bleeding. EGD performed during that admission did not show any active bleeding nor blood in esophagus, stomach, or duodenum but did show a large deep ulcer in the anterior duodenal bulb and large flat ulcer over the posterior duodenal bulb without visible vessel. He also had a tagged RBC scan which was negative. He did not have any further episodes of bleeding and was discharged on 6/15 with a Hgb of 8.4. Pt returned later on the same day 6/15 with repeat GIB and was found to have a Hgb of 6.4. CTA A/P w/o active bleeding. Pt received 2u pRBC however Hgb did not improve (repeat 6.2). He was transfused an additional unit (3u pRBC total) and transferred to [facility] for further management.

## 2021-06-16 NOTE — CONSULT NOTE ADULT - ATTENDING COMMENTS
82 yo male with repair of duodenal ulcer on 4/28, now with GIB.  - IVF resuscitation, transfuse as necessary, trend lactate/pH.  - NPO. IV PPI.  - GI consult for GED and colonoscopy. Consider CTA for localization.  - No acute surgical intervention.  - Will follow.

## 2021-06-16 NOTE — CONSULT NOTE ADULT - SUBJECTIVE AND OBJECTIVE BOX
CC: Patient is a 81y old  Male who presents with a chief complaint of GIB (16 Jun 2021 20:46)    HPI:  81M PMH thrombocytopenia, anemia, AAA, DVT s/p IVC filter, duodenal perforation 4/28/21 s/p amy patch plication and J-tube, H.pylori s/p triple therapy, DM, HTN, HLD, severe dementia (AAOx1 at baseline) recent admission for GIB now presenting again for repeat GIB. Pt first presented on 6/3/21 for SOB and AMS and was admitted to [place] from 6/3 to 6/15 to the ICU for symptomatic anemia and GI bleeding. EGD performed during that admission did not show any active bleeding nor blood in esophagus, stomach, or duodenum but did show a large deep ulcer in the anterior duodenal bulb and large flat ulcer over the posterior duodenal bulb without visible vessel. He also had a tagged RBC scan which was negative. He did not have any further episodes of bleeding and was discharged on 6/15 with a Hgb of 8.4. Pt returned later on the same day 6/15 with repeat GIB and was found to have a Hgb of 6.4. CTA A/P w/o active bleeding. Pt received 2u pRBC however Hgb did not improve (repeat 6.2). He was transfused an additional unit (3u pRBC total) and transferred to [facility] for further management.  (16 Jun 2021 20:46)        PMH  Thrombocytopenia    Anemia    Abdominal aortic aneurysm    Deep vein thrombosis (DVT)    Diabetes mellitus    Hypertension    Hyperlipidemia    Duodenal ulcer      PSH  S/P IVC filter    H/O: duodenal ulcer    H/O exploratory laparotomy      MEDS    Allergies    No Known Allergies    Intolerances        Social unable to obtain        Physical Exam  T(C): 36.8 (06-16-21 @ 20:34), Max: 36.8 (06-16-21 @ 20:34)  HR: 70 (06-16-21 @ 22:00) (70 - 76)  BP: 136/90 (06-16-21 @ 22:00) (110/59 - 136/90)  RR: 32 (06-16-21 @ 22:00) (27 - 35)  SpO2: 100% (06-16-21 @ 22:00) (100% - 100%)  Wt(kg): --  Tmax: T(C): , Max: 36.8 (06-16-21 @ 20:34)  Wt(kg): --    Gen: NAD  HEENT: normocephalic, atraumatic, no scleral icterus  CV: S1, S2, RRR  Pulm: CTA B/L  Abd: Soft, ND, NTP, no rebound, no guarding, no palpable organomegaly/masses, healing laparotomy incision, J tube with no visible blood  Ext: warm, no edema, palp dp/pt    06-16-21  -  06-16-21  --------------------------------------------------------  IN:    Pantoprazole: 10 mL  Total IN: 10 mL    OUT:    Voided (mL): 100 mL  Total OUT: 100 mL    Total NET: -90 mL          Labs:                        7.9    7.94  )-----------( 86       ( 16 Jun 2021 21:38 )             25.6     06-16    140  |  112<H>  |  50<H>  ----------------------------<  113<H>  4.2   |  12<L>  |  2.61<H>    Ca    7.5<L>      16 Jun 2021 22:15  Phos  4.3     06-16  Mg     1.6     06-16    TPro  5.2<L>  /  Alb  1.9<L>  /  TBili  0.7  /  DBili  x   /  AST  15  /  ALT  5<L>  /  AlkPhos  50  06-16              Imaging  OSH as above

## 2021-06-16 NOTE — CONSULT NOTE ADULT - ASSESSMENT
81 year old man with recent history of laparotomy with amy patch repair of perforated duodenal ulcer 4/28 presented twice with GI bleeding, currently transferred to MICU at [facility] for tertiary care    Recommendations:  -continue resuscitation, correction of acidosis, transfuse to keep Hb>8, platelets as needed  -NPO, allow J tube to drain to gravity to evaluate for upper GI bleed  -IVF  -attempt localization, GI consult for upper and lower endoscopy, CTA, redo bleeding scan to localize  -No acute surgical intervention  -will follow    -d/w Dr. Fercho Armendariz, R3  # [phone]

## 2021-06-16 NOTE — H&P ADULT - NSHPPHYSICALEXAM_GEN_ALL_CORE
Vital Signs Last 24 Hrs  T(C): 36.8 (16 Jun 2021 20:34), Max: 36.8 (16 Jun 2021 20:34)  T(F): 98.2 (16 Jun 2021 20:34), Max: 98.2 (16 Jun 2021 20:34)  HR: 72 (16 Jun 2021 21:00) (72 - 76)  BP: 110/59 (16 Jun 2021 21:00) (110/59 - 118/76)  BP(mean): 76 (16 Jun 2021 21:00) (76 - 93)  RR: 27 (16 Jun 2021 21:00) (27 - 35)  SpO2: -- Vital Signs Last 24 Hrs  T(C): 36.8 (16 Jun 2021 20:34), Max: 36.8 (16 Jun 2021 20:34)  T(F): 98.2 (16 Jun 2021 20:34), Max: 98.2 (16 Jun 2021 20:34)  HR: 72 (16 Jun 2021 21:00) (72 - 76)  BP: 110/59 (16 Jun 2021 21:00) (110/59 - 118/76)  BP(mean): 76 (16 Jun 2021 21:00) (76 - 93)  RR: 27 (16 Jun 2021 21:00) (27 - 35)  SpO2: --    PHYSICAL EXAM:  GENERAL: NAD, lying in bed comfortably  HEENT:  NCAT, supple neck  CHEST/LUNG: Clear to auscultation bilaterally; No rales, rhonchi, wheezing, or rubs. Unlabored respirations  HEART: Regular rate and rhythm; S1, S2 present. No murmurs, rubs, or gallops  ABDOMEN: Bowel sounds present; Soft, Nontender, Nondistended. +mid abdominal scar. +J-tube LUQ.   EXTREMITIES:  +pedal edema bilat to mid calves. +venous stasis changes LE bilat.   NERVOUS SYSTEM:  AAOx2 to name and "hospital". No deficits.   MSK: FROM all 4 extremities, full and equal strength

## 2021-06-16 NOTE — H&P ADULT - NSICDXPASTMEDICALHX_GEN_ALL_CORE_FT
PAST MEDICAL HISTORY:  Abdominal aortic aneurysm     Anemia     Deep vein thrombosis (DVT) s/p IVC filter    Diabetes mellitus     Duodenal ulcer s/p amy plication and J-tube 4/2021    Hyperlipidemia     Hypertension     Thrombocytopenia

## 2021-06-16 NOTE — H&P ADULT - ASSESSMENT
81M PMH thrombocytopenia, anemia, AAA, DVT s/p IVC filter, duodenal perforation 4/28/21 s/p amy patch plication and J-tube, DM, HTN, HLD, severe dementia (AAOx1 at baseline), CKD, recent admission 6/3-6/15 for GIB now admitted again for repeat GIB.    NEURO  - non-verbal at baseline    PULM  - JANE    CV    GI  #UGI bleed possibly 2/2 duodenal ulcer  - s/p perforated duodenal ulcer s/p amy patch plication with J tube 4/28/21  - CTA A/P 6/15 negative for active bleeding. Recent EGD and tagged RBC scan in early 6/2021 w/o source of bleeding but did note large deep ulcer in the anterior duodenal bulb and large flat ulcer over the posterior duodenal bulb without visible vessel.  - GI and surgery consult  - may need IR consult for angiographic emobolization if continues to bleed   - continue PPI gtt  - NPO for now    RENAL    ENDO  ID  HEME 81M PMH thrombocytopenia, anemia, AAA, DVT s/p IVC filter, duodenal perforation 4/28/21 s/p amy patch plication and J-tube, DM, HTN, HLD, severe dementia (AAOx1 at baseline), CKD, recent admission 6/3-6/15 for GIB now admitted again for repeat GIB.    NEURO  - AAOx1, at baseline    PULM  - JANE    CV  #hx HTN, HLD  - hold PO meds  - baseline SBP 130s, monitor BP    GI  #UGI bleed possibly 2/2 duodenal ulcer  - s/p perforated duodenal ulcer s/p aym patch plication with J tube 4/28/21  - CTA A/P 6/15 negative for active bleeding. Recent EGD and tagged RBC scan in early 6/2021 w/o source of bleeding but did note large deep ulcer in the anterior duodenal bulb and large flat ulcer over the posterior duodenal bulb without visible vessel.  - GI and surgery consult  - may need IR consult for angiographic emobolization if continues to bleed profusely  - continue PPI gtt  - NPO for now    RENAL  #CKD  - SCr 2.5, at baseline  - monitor uop    ENDO  #DM  - ISS q6h, monitor FSG    ID  - JANE    HEME  # acute blood loss anemia 2/2 UGIB  - GIB plan as above  - transfuse for Hgb<7  - monitor CBC q6h    #hx DVT  - s/p IVC filter  - SCDs, no antithrombotics    #thrombocytopenia  - monitor plts, transfuse for plts<50 81M PMH thrombocytopenia, anemia, AAA, DVT s/p IVC filter, duodenal perforation 4/28/21 s/p amy patch plication and J-tube, DM, HTN, HLD, severe dementia (AAOx1 at baseline), CKD, recent admission 6/3-6/15 for GIB now admitted again for repeat GIB.    NEURO  - AAOx1, at baseline    PULM  - JANE    CV  #hx HTN, HLD  - hold PO meds  - baseline SBP 130s, monitor BP    GI  #UGI bleed possibly 2/2 duodenal ulcer  - s/p perforated duodenal ulcer s/p amy patch plication with J tube 4/28/21  - CTA A/P 6/15 negative for active bleeding. Recent EGD and tagged RBC scan in early 6/2021 w/o source of bleeding but did note large deep ulcer in the anterior duodenal bulb and large flat ulcer over the posterior duodenal bulb without visible vessel.  - GI and surgery consult  - may need IR consult for angiographic emobolization if continues to bleed profusely  - continue PPI gtt  - NPO for now pending possible EGD/colonoscopy    RENAL  #CKD  - SCr 2.5, at baseline  - monitor uop    ENDO  #DM  - ISS q6h, monitor FSG    ID  - JANE    HEME  # acute blood loss anemia 2/2 UGIB  - GIB plan as above  - transfuse for Hgb<7  - monitor CBC q6h    #hx DVT  - s/p IVC filter  - SCDs, no antithrombotics    #thrombocytopenia  - monitor plts, transfuse for plts<50 81M PMH thrombocytopenia, anemia, AAA, DVT s/p IVC filter, duodenal perforation 4/28/21 s/p amy patch plication and J-tube, H.pylori s/p triple therapy, DM, HTN, HLD, severe dementia (AAOx1 at baseline), CKD, recent admission 6/3-6/15 for GIB now admitted again for repeat GIB.    NEURO  - AAOx1, at baseline    PULM  - JANE    CV  #hx HTN, HLD  - hold PO meds  - baseline SBP 130s, monitor BP    GI  #UGI bleed possibly 2/2 duodenal ulcer  - s/p perforated duodenal ulcer s/p amy patch plication with J tube 4/28/21  - CTA A/P 6/15 negative for active bleeding. Recent EGD and tagged RBC scan in early 6/2021 w/o source of bleeding but did note large deep ulcer in the anterior duodenal bulb and large flat ulcer over the posterior duodenal bulb without visible vessel.  - GI and surgery consult  - may need IR consult for angiographic emobolization if continues to bleed profusely  - continue PPI gtt  - NPO for now pending possible EGD/colonoscopy    RENAL  #CKD  - SCr 2.5, at baseline  - monitor uop    ENDO  #DM  - ISS q6h, monitor FSG    ID  - JANE    HEME  # acute blood loss anemia 2/2 UGIB  - GIB plan as above  - transfuse for Hgb<7  - monitor CBC q6h    #hx DVT  - s/p IVC filter  - SCDs, no antithrombotics    #thrombocytopenia  - monitor plts, transfuse for plts<50 81M PMH thrombocytopenia, anemia, AAA, DVT s/p IVC filter, duodenal perforation 4/28/21 s/p amy patch plication and J-tube, H.pylori s/p triple therapy, DM, HTN, HLD, severe dementia (AAOx1 at baseline), CKD, recent admission 6/3-6/15 for GIB now admitted again for repeat GIB.    NEURO  - AAOx1, at baseline    PULM  - JANE    CV  #hx HTN, HLD  - hold PO meds  - baseline SBP 130s, monitor BP    GI  #UGI bleed possibly 2/2 duodenal ulcer  - s/p perforated duodenal ulcer s/p amy patch plication with J tube 4/28/21  - CTA A/P 6/15 negative for active bleeding. Recent EGD and tagged RBC scan in early 6/2021 w/o source of bleeding but did note large deep ulcer in the anterior duodenal bulb and large flat ulcer over the posterior duodenal bulb without visible vessel.  - GI and surgery consult  - may need IR consult for angiographic emobolization if continues to bleed profusely  - continue PPI gtt  - NPO for now pending possible EGD/colonoscopy    RENAL  #CKD  - SCr 2.5, at baseline  - monitor uop    ENDO  #DM  - ISS q6h, monitor FSG    ID  - JANE    HEME  # acute blood loss anemia 2/2 UGIB  - GIB plan as above  - transfuse for Hgb<7  - monitor CBC q6h    #hx DVT  - s/p IVC filter  - SCDs, no antithrombotics    #thrombocytopenia  - monitor plts, transfuse for plts<50    ETHICS  - full code  - next of kin daughter, Melissa Burns 81M PMH thrombocytopenia, anemia, AAA, DVT s/p IVC filter, duodenal perforation 4/28/21 s/p amy patch plication and J-tube, H.pylori s/p triple therapy, DM, HTN, HLD, severe dementia (AAOx1 at baseline), CKD, recent admission 6/3-6/15 for GIB now admitted again for repeat GIB.    NEURO  - AAOx1, at baseline    PULM  - JANE    CV  #hx HTN, HLD  - hold PO meds  - baseline SBP 130s, monitor BP    GI  #UGI bleed possibly 2/2 duodenal ulcer  - s/p perforated duodenal ulcer s/p amy patch plication with J tube 4/28/21  - CTA A/P 6/15 negative for active bleeding. Recent EGD and tagged RBC scan in early 6/2021 w/o source of bleeding but did note large deep ulcer in the anterior duodenal bulb and large flat ulcer over the posterior duodenal bulb without visible vessel.  - GI and surgery consult  - may need IR consult for angiographic emobolization if continues to bleed profusely  - continue PPI gtt  - NPO for now pending possible EGD/colonoscopy    RENAL  #CKD  - SCr 2.5, at baseline  - monitor uop    ENDO  #DM  - ISS q6h, monitor FSG    ID  - JANE    HEME  # acute blood loss anemia 2/2 UGIB  - GIB plan as above  - s/p 3u pRBC, transfuse for Hgb<7  - monitor CBC q6h    #hx DVT  - s/p IVC filter  - SCDs, no antithrombotics    #thrombocytopenia  - monitor plts, transfuse for plts<50    ETHICS  - full code  - next of kin daughter, Melissa Burns 81M PMH thrombocytopenia, anemia, AAA, DVT s/p IVC filter, duodenal perforation 4/28/21 s/p amy patch plication and J-tube, H.pylori s/p triple therapy, DM, HTN, HLD, severe dementia (AAOx1 at baseline), CKD, recent admission 6/3-6/15 for GIB now admitted again for repeat GIB.    NEURO  - AAOx1, at baseline    PULM  - JANE    CV  #hx HTN, HLD  - hold PO meds  - baseline SBP 130s, monitor BP  -relative hypoTN improved s/p blood resuscitation    GI  #UGI bleed possibly 2/2 duodenal ulcer  - s/p perforated duodenal ulcer s/p amy patch plication with J tube 4/28/21  - CTA A/P 6/15 negative for active bleeding. Recent EGD and tagged RBC scan in early 6/2021 w/o source of bleeding but did note large deep ulcer in the anterior duodenal bulb and large flat ulcer over the posterior duodenal bulb without visible vessel.  - GI and surgery consult  - may need IR consult for angiographic emobolization if continues to bleed profusely  - continue PPI gtt  - NPO for now pending possible EGD/colonoscopy    RENAL  #CKD  - SCr 2.5, at baseline  - monitor uop    ENDO  #DM  - ISS q6h, monitor FSG    ID  - JANE    HEME  # acute blood loss anemia 2/2 UGIB  - GIB plan as above  - s/p 3u pRBC, transfuse for Hgb<7  - monitor CBC q6h    #hx DVT  - s/p IVC filter  - SCDs, no antithrombotics    #chronic thrombocytopenia  - monitor plts, transfuse for plts<50    ETHICS  - full code  - next of kin daughterMelissa

## 2021-06-16 NOTE — H&P ADULT - NSICDXPASTSURGICALHX_GEN_ALL_CORE_FT
PAST SURGICAL HISTORY:  H/O: duodenal ulcer s/p amy plication and J-tube 4/2021    S/P IVC filter      PAST SURGICAL HISTORY:  H/O exploratory laparotomy 4/2021 for duodenal perforation    H/O: duodenal ulcer s/p amy plication and J-tube 4/2021    S/P IVC filter

## 2021-06-16 NOTE — H&P ADULT - NSHPLABSRESULTS_GEN_ALL_CORE
CTA Abdomen and Pelvis:  1. Infrarenal abdominal aortic aneurysm as well as large bilateral common iliac artery aneurysms as previously described.  2. There is no aortoenteric fistula  3. Findings again noted suggesting a duodenal ulcer.    CT Abdomen and Pelvis without contrast:  Duodenal wall thickening and periduodenal fat stranding again noted. Suspect focal duodenal ulcer. Diverticulosis. Question mild cecal diverticulitis. Aortobiliac artery aneurysms as described. Cannot assess for aortoenteric fistula on a noncontrast CT. Bilateral DVT. IVC filter noted.    CT Brain:  Involutional changes and microvascular white matter changes. No evidence of intracranial hemorrhage, mass lesion, or acute territorial infarct noted.     XRay Chest:  Patchy right basilar infiltrate. Linear atelectasis left lower lobe. No free air under diaphragm.    Xray Abdomen/pelvis:  No bowel obstruction. IVC filter. LABS:                        7.9    7.94  )-----------( 86       ( 16 Jun 2021 21:38 )             25.6     06-16    140  |  112<H>  |  50<H>  ----------------------------<  113<H>  4.2   |  12<L>  |  2.61<H>    Ca    7.5<L>      16 Jun 2021 22:15  Phos  4.3     06-16  Mg     1.6     06-16    TPro  5.2<L>  /  Alb  1.9<L>  /  TBili  0.7  /  DBili  x   /  AST  15  /  ALT  5<L>  /  AlkPhos  50  06-16    RADIOLOGY/ADDITIONAL TESTS:    CTA Abdomen and Pelvis:  1. Infrarenal abdominal aortic aneurysm as well as large bilateral common iliac artery aneurysms as previously described.  2. There is no aortoenteric fistula  3. Findings again noted suggesting a duodenal ulcer.    CT Abdomen and Pelvis without contrast:  Duodenal wall thickening and periduodenal fat stranding again noted. Suspect focal duodenal ulcer. Diverticulosis. Question mild cecal diverticulitis. Aortobiliac artery aneurysms as described. Cannot assess for aortoenteric fistula on a noncontrast CT. Bilateral DVT. IVC filter noted.    CT Brain:  Involutional changes and microvascular white matter changes. No evidence of intracranial hemorrhage, mass lesion, or acute territorial infarct noted.     XRay Chest:  Patchy right basilar infiltrate. Linear atelectasis left lower lobe. No free air under diaphragm.    Xray Abdomen/pelvis:  No bowel obstruction. IVC filter. LABS:                        7.9    7.94  )-----------( 86       ( 16 Jun 2021 21:38 )             25.6     06-16    140  |  112<H>  |  50<H>  ----------------------------<  113<H>  4.2   |  12<L>  |  2.61<H>    Ca    7.5<L>      16 Jun 2021 22:15  Phos  4.3     06-16  Mg     1.6     06-16    TPro  5.2<L>  /  Alb  1.9<L>  /  TBili  0.7  /  DBili  x   /  AST  15  /  ALT  5<L>  /  AlkPhos  50  06-16    Prothrombin Time and INR, Plasma in AM (06.16.21 @ 23:39)    Prothrombin Time, Plasma: 14.6 sec    INR: 1.23:   Activated Partial Thromboplastin Time: 30.9 sec (06.16.21 @ 23:39)    Blood Gas Profile - Arterial (06.16.21 @ 23:35)    pH, Arterial: 7.40    pCO2, Arterial: 29 mmHg    pO2, Arterial: 123 mmHg    HCO3, Arterial: 17 mmoL/L    Base Excess, Arterial: -6.2 mmol/L    Oxygen Saturation, Arterial: 99 %    Total CO2, Arterial: 18 mmoL/L    FIO2, Arterial: 32    Blood Gas Source Arterial: Arterial    Blood Gas Arterial, Lactate: 0.6 mmol/L (06.16.21 @ 23:35)    RADIOLOGY/ADDITIONAL TESTS:    CTA Abdomen and Pelvis:  1. Infrarenal abdominal aortic aneurysm as well as large bilateral common iliac artery aneurysms as previously described.  2. There is no aortoenteric fistula  3. Findings again noted suggesting a duodenal ulcer.    CT Abdomen and Pelvis without contrast:  Duodenal wall thickening and periduodenal fat stranding again noted. Suspect focal duodenal ulcer. Diverticulosis. Question mild cecal diverticulitis. Aortobiliac artery aneurysms as described. Cannot assess for aortoenteric fistula on a noncontrast CT. Bilateral DVT. IVC filter noted.    CT Brain:  Involutional changes and microvascular white matter changes. No evidence of intracranial hemorrhage, mass lesion, or acute territorial infarct noted.     XRay Chest:  Patchy right basilar infiltrate. Linear atelectasis left lower lobe. No free air under diaphragm.    Xray Abdomen/pelvis:  No bowel obstruction. IVC filter.

## 2021-06-17 NOTE — PROGRESS NOTE ADULT - SUBJECTIVE AND OBJECTIVE BOX
GENERAL SURGERY DAILY PROGRESS NOTE:    Subjective:  Patient seen and examined. Reports pain is well controlled. Denies N/V. Melena overnight per RN    Vital Signs Last 24 Hrs  T(C): 36.3 (17 Jun 2021 12:00), Max: 36.8 (16 Jun 2021 20:34)  T(F): 97.4 (17 Jun 2021 12:00), Max: 98.2 (16 Jun 2021 20:34)  HR: 56 (17 Jun 2021 15:00) (56 - 76)  BP: 103/66 (17 Jun 2021 15:00) (95/51 - 181/74)  BP(mean): 81 (17 Jun 2021 15:00) (68 - 108)  RR: 14 (17 Jun 2021 15:00) (14 - 48)  SpO2: 100% (17 Jun 2021 15:00) (90% - 100%)    Exam:  Gen: NAD, resting in bed, alert and responding appropriately  Resp: Airway patent, non-labored respirations  Abd: Soft, ND, NT, no rebound or guarding, J-tube in place, with minimal drainage  Ext: No edema, WWP    I&O's Detail    16 Jun 2021 07:01  -  17 Jun 2021 07:00  --------------------------------------------------------  IN:    IV PiggyBack: 100 mL    Pantoprazole: 100 mL    PRBCs (Packed Red Blood Cells): 300 mL  Total IN: 500 mL    OUT:    Incontinent per Condom Catheter (mL): 300 mL  Total OUT: 300 mL    Total NET: 200 mL      17 Jun 2021 07:01  -  17 Jun 2021 15:17  --------------------------------------------------------  IN:    Pantoprazole: 80 mL    PRBCs (Packed Red Blood Cells): 300 mL  Total IN: 380 mL    OUT:    Incontinent per Condom Catheter (mL): 400 mL  Total OUT: 400 mL    Total NET: -20 mL    Daily Height in cm: 180.34 (16 Jun 2021 20:34)    Daily     MEDICATIONS  (STANDING):  chlorhexidine 4% Liquid 1 Application(s) Topical <User Schedule>  insulin lispro (ADMELOG) corrective regimen sliding scale   SubCutaneous every 6 hours  pantoprazole Infusion 8 mG/Hr (10 mL/Hr) IV Continuous <Continuous>    MEDICATIONS  (PRN):      LABS:                        7.0    5.01  )-----------( 86       ( 17 Jun 2021 09:27 )             22.1     06-16    140  |  112<H>  |  50<H>  ----------------------------<  113<H>  4.2   |  12<L>  |  2.61<H>    Ca    7.5<L>      16 Jun 2021 22:15  Phos  4.3     06-16  Mg     1.6     06-16    TPro  5.2<L>  /  Alb  1.9<L>  /  TBili  0.7  /  DBili  x   /  AST  15  /  ALT  5<L>  /  AlkPhos  50  06-16    PT/INR - ( 16 Jun 2021 23:39 )   PT: 14.6 sec;   INR: 1.23 ratio         PTT - ( 16 Jun 2021 23:39 )  PTT:30.9 sec

## 2021-06-17 NOTE — DIETITIAN INITIAL EVALUATION ADULT. - REASON INDICATOR FOR ASSESSMENT
Last seen 11/5/19  Late cancel 12/11/19  Next appointment 12/16/19    Received prescription renewal request for Vyvanse 30mg.   Verified dosage(s) against last provider appt note, last renewed on 12/6/19 for 6 day supply by covering provider.  PDMP report indicates last refill dispensed on 12/6/19 for 6 day supply.    Is the patient due for new prescription of this medication(s): yes  PDMP review:  [x] Criteria MET. Rx pended and sent to provider for approval.  [] Criteria NOT MET.    Forwarded to provider for further review and decision on medication(s) requested.    Length Of Stay

## 2021-06-17 NOTE — PROGRESS NOTE ADULT - SUBJECTIVE AND OBJECTIVE BOX
CHIEF COMPLAINT: Patient is a 81y old  Male who presents with a chief complaint of GIB (16 Jun 2021 23:11)    Interval Events: bilat hip/groin pain overnight, given IV Tylenol with benefit.     REVIEW OF SYSTEMS:  [x ] All other systems negative      OBJECTIVE:  ICU Vital Signs Last 24 Hrs  T(C): 36.7 (17 Jun 2021 04:00), Max: 36.8 (16 Jun 2021 20:34)  T(F): 98 (17 Jun 2021 04:00), Max: 98.2 (16 Jun 2021 20:34)  HR: 65 (17 Jun 2021 04:00) (65 - 76)  BP: 126/64 (17 Jun 2021 04:00) (110/59 - 136/90)  BP(mean): 90 (17 Jun 2021 04:00) (76 - 108)  ABP: --  ABP(mean): --  RR: 27 (17 Jun 2021 04:00) (23 - 36)  SpO2: 100% (17 Jun 2021 04:00) (90% - 100%)        06-16 @ 07:01  -  06-17 @ 05:04  --------------------------------------------------------  IN: 480 mL / OUT: 300 mL / NET: 180 mL      CAPILLARY BLOOD GLUCOSE    PHYSICAL EXAM:  General: NAD  HEENT: PERRLA, EOMI, moist mucous membranes  Neurology: A&Ox2, nonfocal, MACEDO x 4  Respiratory: decreased breath sounds, normal respiratory effort, no wheezes, crackles, rales  CV: RRR, S1S2, no murmurs, rubs or gallops  Abdominal: Soft, NT, ND +BS, +mid abdominal   Extremities: No edema, + peripheral pulses  Incisions:   Tubes:    HOSPITAL MEDICATIONS:  MEDICATIONS  (STANDING):  chlorhexidine 4% Liquid 1 Application(s) Topical <User Schedule>  insulin lispro (ADMELOG) corrective regimen sliding scale   SubCutaneous every 6 hours  pantoprazole Infusion 8 mG/Hr (10 mL/Hr) IV Continuous <Continuous>    MEDICATIONS  (PRN):      LABS:  (06-17 @ 03:06)                        6.9  6.40 )-----------( 92                 21.9    Neutrophils = -- (--%)  Lymphocytes = -- (--%)  Eosinophils = -- (--%)  Basophils = -- (--%)  Monocytes = -- (--%)  Bands = --%    WBC Trend: 6.40<--, 7.94<--  Hb Trend: 6.9<--, 7.9<--  Plt Trend: 92<--, 86<--  06-16    140  |  112<H>  |  50<H>  ----------------------------<  113<H>  4.2   |  12<L>  |  2.61<H>    Ca    7.5<L>      16 Jun 2021 22:15  Phos  4.3     06-16  Mg     1.6     06-16    TPro  5.2<L>  /  Alb  1.9<L>  /  TBili  0.7  /  DBili  x   /  AST  15  /  ALT  5<L>  /  AlkPhos  50  06-16    Creatinine Trend: 2.61<--  PT/INR - ( 16 Jun 2021 23:39 )   PT: 14.6 sec;   INR: 1.23 ratio         PTT - ( 16 Jun 2021 23:39 )  PTT:30.9 sec    Arterial Blood Gas:  06-16 @ 23:35  7.40/29/123/17/99/-6.2  ABG lactate: --            MICROBIOLOGY:   Blood Cx:  Urine Cx:  Sputum Cx:  Legionella:  RVP:    RADIOLOGY:  X Ray:  CT:  MRI:  Ultrasound:  [ ] Reviewed and interpreted by me    EKG:   CHIEF COMPLAINT: Patient is a 81y old  Male who presents with a chief complaint of GIB (16 Jun 2021 23:11)    Interval Events: bilat hip/groin pain overnight, given IV Tylenol with benefit.     REVIEW OF SYSTEMS:  [x ] All other systems negative      OBJECTIVE:  ICU Vital Signs Last 24 Hrs  T(C): 36.7 (17 Jun 2021 04:00), Max: 36.8 (16 Jun 2021 20:34)  T(F): 98 (17 Jun 2021 04:00), Max: 98.2 (16 Jun 2021 20:34)  HR: 65 (17 Jun 2021 04:00) (65 - 76)  BP: 126/64 (17 Jun 2021 04:00) (110/59 - 136/90)  BP(mean): 90 (17 Jun 2021 04:00) (76 - 108)  ABP: --  ABP(mean): --  RR: 27 (17 Jun 2021 04:00) (23 - 36)  SpO2: 100% (17 Jun 2021 04:00) (90% - 100%)        06-16 @ 07:01  -  06-17 @ 05:04  --------------------------------------------------------  IN: 480 mL / OUT: 300 mL / NET: 180 mL      CAPILLARY BLOOD GLUCOSE    PHYSICAL EXAM:  General: NAD  HEENT: PERRLA, EOMI, moist mucous membranes  Neurology: A&Ox2, nonfocal, MACEDO x 4  Respiratory: decreased breath sounds, normal respiratory effort, no wheezes, crackles, rales  CV: RRR, S1S2, no murmurs, rubs or gallops  Abdominal: Soft, NT, ND +BS, +mid abdominal scar, LUQ J-tube  Extremities: +pedal edema to mid calves, + peripheral pulses      HOSPITAL MEDICATIONS:  MEDICATIONS  (STANDING):  chlorhexidine 4% Liquid 1 Application(s) Topical <User Schedule>  insulin lispro (ADMELOG) corrective regimen sliding scale   SubCutaneous every 6 hours  pantoprazole Infusion 8 mG/Hr (10 mL/Hr) IV Continuous <Continuous>    MEDICATIONS  (PRN):      LABS:  (06-17 @ 03:06)                        6.9  6.40 )-----------( 92                 21.9    Neutrophils = -- (--%)  Lymphocytes = -- (--%)  Eosinophils = -- (--%)  Basophils = -- (--%)  Monocytes = -- (--%)  Bands = --%    WBC Trend: 6.40<--, 7.94<--  Hb Trend: 6.9<--, 7.9<--  Plt Trend: 92<--, 86<--  06-16    140  |  112<H>  |  50<H>  ----------------------------<  113<H>  4.2   |  12<L>  |  2.61<H>    Ca    7.5<L>      16 Jun 2021 22:15  Phos  4.3     06-16  Mg     1.6     06-16    TPro  5.2<L>  /  Alb  1.9<L>  /  TBili  0.7  /  DBili  x   /  AST  15  /  ALT  5<L>  /  AlkPhos  50  06-16    Creatinine Trend: 2.61<--  PT/INR - ( 16 Jun 2021 23:39 )   PT: 14.6 sec;   INR: 1.23 ratio         PTT - ( 16 Jun 2021 23:39 )  PTT:30.9 sec    Arterial Blood Gas:  06-16 @ 23:35  7.40/29/123/17/99/-6.2  ABG lactate: --     CHIEF COMPLAINT: Patient is a 81y old  Male who presents with a chief complaint of GIB (16 Jun 2021 23:11)    Interval Events: bilat hip/groin pain overnight, given IV Tylenol with benefit. Hgb 6.8 this AM, given 1 uprbc.    REVIEW OF SYSTEMS:  [x ] All other systems negative      OBJECTIVE:  ICU Vital Signs Last 24 Hrs  T(C): 36.7 (17 Jun 2021 04:00), Max: 36.8 (16 Jun 2021 20:34)  T(F): 98 (17 Jun 2021 04:00), Max: 98.2 (16 Jun 2021 20:34)  HR: 65 (17 Jun 2021 04:00) (65 - 76)  BP: 126/64 (17 Jun 2021 04:00) (110/59 - 136/90)  BP(mean): 90 (17 Jun 2021 04:00) (76 - 108)  ABP: --  ABP(mean): --  RR: 27 (17 Jun 2021 04:00) (23 - 36)  SpO2: 100% (17 Jun 2021 04:00) (90% - 100%)        06-16 @ 07:01  -  06-17 @ 05:04  --------------------------------------------------------  IN: 480 mL / OUT: 300 mL / NET: 180 mL      CAPILLARY BLOOD GLUCOSE    PHYSICAL EXAM:  General: NAD  HEENT: PERRLA, EOMI, moist mucous membranes  Neurology: A&Ox2, nonfocal, MACEDO x 4  Respiratory: decreased breath sounds, normal respiratory effort, no wheezes, crackles, rales  CV: RRR, S1S2, no murmurs, rubs or gallops  Abdominal: Soft, NT, ND +BS, +mid abdominal scar, LUQ J-tube  Extremities: +pedal edema to mid calves, + peripheral pulses      HOSPITAL MEDICATIONS:  MEDICATIONS  (STANDING):  chlorhexidine 4% Liquid 1 Application(s) Topical <User Schedule>  insulin lispro (ADMELOG) corrective regimen sliding scale   SubCutaneous every 6 hours  pantoprazole Infusion 8 mG/Hr (10 mL/Hr) IV Continuous <Continuous>    MEDICATIONS  (PRN):      LABS:  (06-17 @ 03:06)                        6.9  6.40 )-----------( 92                 21.9    Neutrophils = -- (--%)  Lymphocytes = -- (--%)  Eosinophils = -- (--%)  Basophils = -- (--%)  Monocytes = -- (--%)  Bands = --%    WBC Trend: 6.40<--, 7.94<--  Hb Trend: 6.9<--, 7.9<--  Plt Trend: 92<--, 86<--  06-16    140  |  112<H>  |  50<H>  ----------------------------<  113<H>  4.2   |  12<L>  |  2.61<H>    Ca    7.5<L>      16 Jun 2021 22:15  Phos  4.3     06-16  Mg     1.6     06-16    TPro  5.2<L>  /  Alb  1.9<L>  /  TBili  0.7  /  DBili  x   /  AST  15  /  ALT  5<L>  /  AlkPhos  50  06-16    Creatinine Trend: 2.61<--  PT/INR - ( 16 Jun 2021 23:39 )   PT: 14.6 sec;   INR: 1.23 ratio         PTT - ( 16 Jun 2021 23:39 )  PTT:30.9 sec    Arterial Blood Gas:  06-16 @ 23:35  7.40/29/123/17/99/-6.2  ABG lactate: --     CHIEF COMPLAINT: Patient is a 81y old  Male who presents with a chief complaint of GIB (16 Jun 2021 23:11)    Interval Events: bilat hip/groin pain overnight, given IV Tylenol with benefit. Hgb 6.9 this AM, given 1 uprbc.    REVIEW OF SYSTEMS:  [x ] All other systems negative      OBJECTIVE:  ICU Vital Signs Last 24 Hrs  T(C): 36.7 (17 Jun 2021 04:00), Max: 36.8 (16 Jun 2021 20:34)  T(F): 98 (17 Jun 2021 04:00), Max: 98.2 (16 Jun 2021 20:34)  HR: 65 (17 Jun 2021 04:00) (65 - 76)  BP: 126/64 (17 Jun 2021 04:00) (110/59 - 136/90)  BP(mean): 90 (17 Jun 2021 04:00) (76 - 108)  ABP: --  ABP(mean): --  RR: 27 (17 Jun 2021 04:00) (23 - 36)  SpO2: 100% (17 Jun 2021 04:00) (90% - 100%)        06-16 @ 07:01  -  06-17 @ 05:04  --------------------------------------------------------  IN: 480 mL / OUT: 300 mL / NET: 180 mL      CAPILLARY BLOOD GLUCOSE    PHYSICAL EXAM:  General: NAD  HEENT: PERRLA, EOMI, moist mucous membranes  Neurology: A&Ox2, nonfocal, MACEDO x 4  Respiratory: decreased breath sounds, normal respiratory effort, no wheezes, crackles, rales  CV: RRR, S1S2, no murmurs, rubs or gallops  Abdominal: Soft, NT, ND +BS, +mid abdominal scar, LUQ J-tube  Extremities: +pedal edema to mid calves, + peripheral pulses      HOSPITAL MEDICATIONS:  MEDICATIONS  (STANDING):  chlorhexidine 4% Liquid 1 Application(s) Topical <User Schedule>  insulin lispro (ADMELOG) corrective regimen sliding scale   SubCutaneous every 6 hours  pantoprazole Infusion 8 mG/Hr (10 mL/Hr) IV Continuous <Continuous>    MEDICATIONS  (PRN):      LABS:  (06-17 @ 03:06)                        6.9  6.40 )-----------( 92                 21.9    Neutrophils = -- (--%)  Lymphocytes = -- (--%)  Eosinophils = -- (--%)  Basophils = -- (--%)  Monocytes = -- (--%)  Bands = --%    WBC Trend: 6.40<--, 7.94<--  Hb Trend: 6.9<--, 7.9<--  Plt Trend: 92<--, 86<--  06-16    140  |  112<H>  |  50<H>  ----------------------------<  113<H>  4.2   |  12<L>  |  2.61<H>    Ca    7.5<L>      16 Jun 2021 22:15  Phos  4.3     06-16  Mg     1.6     06-16    TPro  5.2<L>  /  Alb  1.9<L>  /  TBili  0.7  /  DBili  x   /  AST  15  /  ALT  5<L>  /  AlkPhos  50  06-16    Creatinine Trend: 2.61<--  PT/INR - ( 16 Jun 2021 23:39 )   PT: 14.6 sec;   INR: 1.23 ratio         PTT - ( 16 Jun 2021 23:39 )  PTT:30.9 sec    Arterial Blood Gas:  06-16 @ 23:35  7.40/29/123/17/99/-6.2  ABG lactate: --

## 2021-06-17 NOTE — DIETITIAN INITIAL EVALUATION ADULT. - ENTERAL
When tube feeds can be initiated, recommend Vital 1.5 goal 85cc/hr x 18 hrs to provide 2295 kcals, 103gm protein, 1169cc free water for 25 kcals/kg, 1.1 protein/kg  dosing wt of 90.1kg

## 2021-06-17 NOTE — CONSULT NOTE ADULT - ATTENDING COMMENTS
Patient seen and examined, agree with above. Patient was transferred from [facility] for GI bleeding, had one EGD there showing non-bleeding ulcers, these ulcers are still on top of differential. Would repeat EGD today, continue PPI and keep NPO.

## 2021-06-17 NOTE — CONSULT NOTE ADULT - ASSESSMENT
Impression:  81M PMH thrombocytopenia, anemia, AAA, DVT s/p IVC filter, duodenal perforation 4/28/21 s/p amy patch plication and J-tube, H.pylori s/p triple therapy, DM, HTN, HLD, severe dementia (AAOx1 at baseline) recent admission for GIB at OSH  #GIB - with continued dropping hemoglobin and blood in stool    Recommendations:   - IV PPI gtt   - monitor hemoglobin, repeat now   - EGD timing and place depending on above (discussed with MICU team)   - transfuse as needed   - two active IVs at all times   - active type and screen   - send iron studies including retic count, LDH, haptoglobin    Annette Coreas  Gastroenterology Fellow  Pager x [phone] or [phone]  Please page on-call Fellow on weekends/holidays or after 5 pm and before 8 am on weekdays   On-call GI fellow can be contacted via the answering service ([phone])   Impression:  81M PMH thrombocytopenia, anemia, AAA, DVT s/p IVC filter, duodenal perforation 4/28/21 s/p amy patch plication and J-tube, H.pylori s/p triple therapy, DM, HTN, HLD, severe dementia (AAOx1 at baseline) recent admission for GIB at OSH  #GIB - with continued dropping hemoglobin and blood in stool    Recommendations:   - IV PPI gtt   - monitor hemoglobin, repeat now   - EGD timing and place depending on above (discussed with MICU team)   - transfuse as needed  - Keep NPO   - two active IVs at all times   - active type and screen   - send iron studies including retic count, LDH, haptoglobin    Annette Coreas  Gastroenterology Fellow  Pager x [phone] or [phone]  Please page on-call Fellow on weekends/holidays or after 5 pm and before 8 am on weekdays   On-call GI fellow can be contacted via the answering service ([phone])

## 2021-06-17 NOTE — PROGRESS NOTE ADULT - ASSESSMENT
81M PMH thrombocytopenia, anemia, AAA, DVT s/p IVC filter, duodenal perforation 4/28/21 s/p amy patch plication and J-tube, H.pylori s/p triple therapy, DM, HTN, HLD, severe dementia (AAOx1 at baseline), CKD, recent admission 6/3-6/15 for GIB now admitted again for repeat GIB.    NEURO  - AAOx2 to name and "hospital", at baseline    PULM  - JANE    CV  #hx HTN, HLD  - hold PO meds  - baseline SBP 130s, monitor BP    GI  #UGI bleed possibly 2/2 duodenal ulcer  - s/p perforated duodenal ulcer s/p amy patch plication with J tube 4/28/21  - CTA A/P 6/15 negative for active bleeding. Recent EGD and tagged RBC scan in early 6/2021 w/o source of bleeding but did note large deep ulcer in the anterior duodenal bulb and large flat ulcer over the posterior duodenal bulb without visible vessel.  - GI and surgery consult  - may need IR consult for angiographic emobolization if continues to bleed profusely  - continue PPI gtt  - NPO for now pending possible EGD/colonoscopy    RENAL  #CKD  - SCr 2.5, at baseline  - monitor uop    ENDO  #DM  - ISS q6h, monitor FSG    ID  - JANE    HEME  # acute blood loss anemia 2/2 UGIB  - GIB plan as above  - transfuse for Hgb<7  - monitor CBC q6h    #hx DVT  - s/p IVC filter  - SCDs, no antithrombotics    #thrombocytopenia  - monitor plts, transfuse for plts<50    ETHICS  - full code  - next of kin daughter, Melissa Burns   81M PMH thrombocytopenia, anemia, AAA, DVT s/p IVC filter, duodenal perforation 4/28/21 s/p amy patch plication and J-tube, H.pylori s/p triple therapy, DM, HTN, HLD, severe dementia (AAOx1 at baseline), CKD, recent admission 6/3-6/15 for GIB now admitted again for repeat GIB.    NEURO  - AAOx2 to name and "hospital", at baseline    PULM  - JANE    CV  #hx HTN, HLD  - hold PO meds  - baseline SBP 130s, monitor BP    GI  #UGI bleed possibly 2/2 duodenal ulcer  - s/p perforated duodenal ulcer s/p amy patch plication with J tube 4/28/21  - CTA A/P 6/15 negative for active bleeding. Recent EGD and tagged RBC scan in early 6/2021 w/o source of bleeding but did note large deep ulcer in the anterior duodenal bulb and large flat ulcer over the posterior duodenal bulb without visible vessel.  - GI and surgery consult  - may need IR consult for angiographic emobolization if continues to bleed profusely  - continue PPI gtt  - NPO for now pending possible EGD/colonoscopy    RENAL  #CKD  - SCr 2.5, at baseline  - monitor uop    ENDO  #DM  - ISS q6h, monitor FSG    ID  - JANE    HEME  # acute blood loss anemia 2/2 UGIB  - GIB plan as above  - s/p 4u pRBC, transfuse for Hgb<7  - monitor CBC q6h    #hx DVT  - s/p IVC filter  - SCDs, no antithrombotics    #thrombocytopenia  - monitor plts, transfuse for plts<50    ETHICS  - full code  - next of kin daughter, Melissa Burns

## 2021-06-17 NOTE — PROGRESS NOTE ADULT - ASSESSMENT
81 year old man with recent history of laparotomy with amy patch repair of perforated duodenal ulcer 4/28 presented twice with GI bleeding, currently transferred to MICU at [facility] for tertiary care    Recommendations:  -NPO, allow J tube to drain to gravity to evaluate for upper GI bleed  -IVF  -GI consult for upper and lower endoscopy   - consider CTA, redo bleeding scan to localize  -No acute surgical intervention    [id number]

## 2021-06-17 NOTE — DIETITIAN INITIAL EVALUATION ADULT. - PERTINENT MEDS FT
MEDICATIONS  (STANDING):  chlorhexidine 4% Liquid 1 Application(s) Topical <User Schedule>  insulin lispro (ADMELOG) corrective regimen sliding scale   SubCutaneous every 6 hours  pantoprazole Infusion 8 mG/Hr (10 mL/Hr) IV Continuous <Continuous>

## 2021-06-17 NOTE — DIETITIAN INITIAL EVALUATION ADULT. - ORAL INTAKE PTA/DIET HISTORY
Spoke w/ pt's dtr over the phone, stated he was getting J tube feeds of Nepro 40 cc/hr x12 hr (6 p-6 a) when he was at home (tube feeds provided 864 kcals, 39 gm protein) and had been on pureed diet w/ poor intake. No diet info available in  hospital transfer notes (was at [facility] 6/3-6/16).

## 2021-06-17 NOTE — DIETITIAN INITIAL EVALUATION ADULT. - OTHER INFO
Pt seen for:  MICU Length Of Stay                                  GI issues:  no N/V  liquid BM 6/16               Food Allergies/Intolerances: NKFA                 Vitamins/Supplements: multivitamin, Fe, folic acid  Wt hx:  per daughter, pt's usual wt 220-230 lb. Dosing wt 6/16 198 lb.                            Skin: no pressure injuries documented     Subjective/Objective: pt confused. Per daughter, at home check BG once a day or every other day, unable to recall range.    Wt used for nutrition needs: dosing wt 6/16 90.1 kg

## 2021-06-17 NOTE — CONSULT NOTE ADULT - SUBJECTIVE AND OBJECTIVE BOX
Chief Complaint:  Patient is a 81y old  Male who presents with a chief complaint of GIB (17 Jun 2021 05:04)      HPI:  The patient is a 81M PMH thrombocytopenia, anemia, AAA, DVT s/p IVC filter, duodenal perforation 4/28/21 s/p amy patch plication and J-tube, H.pylori s/p triple therapy, DM, HTN, HLD, severe dementia (AAOx1 at baseline) recent admission for GIB now presenting again for repeat GIB. Pt first presented on 6/3/21 for SOB and AMS and was admitted to [facility] from 6/3 to 6/15 to the ICU for symptomatic anemia and GI bleeding. EGD performed during that admission did not show any active bleeding nor blood in esophagus, stomach, or duodenum but did show a large deep ulcer in the anterior duodenal bulb and large flat ulcer over the posterior duodenal bulb without visible vessel. He also had a tagged RBC scan which was negative. He did not have any further episodes of bleeding and was discharged on 6/15 with a Hgb of 8.4. Pt returned later on the same day 6/15 with repeat GIB and was found to have a Hgb of 6.4. CTA A/P w/o active bleeding. Pt received 2u pRBC however Hgb did not improve (repeat 6.2). He was transfused an additional unit (3u pRBC total) and transferred to [facility] for further management.     On arrival her VSS.  hemoglobin 7.9 now 6.9 s/p one unit of blood pending repeat.  VS still stable.  BUN elevated but also with WALDO.  Patient is not really able to describe what is going on.    Allergies:  No Known Allergies      Home Medications:    Hospital Medications:  chlorhexidine 4% Liquid 1 Application(s) Topical <User Schedule>  insulin lispro (ADMELOG) corrective regimen sliding scale   SubCutaneous every 6 hours  pantoprazole Infusion 8 mG/Hr IV Continuous <Continuous>      PMHX/PSHX:  Thrombocytopenia    Anemia    Abdominal aortic aneurysm    Deep vein thrombosis (DVT)    Diabetes mellitus    Hypertension    Hyperlipidemia    Duodenal ulcer    S/P IVC filter    H/O: duodenal ulcer    H/O exploratory laparotomy        Family history:      Social History:     ROS:     General:  No wt loss, fevers, chills, night sweats, fatigue,   Eyes:  Good vision, no reported pain  ENT:  No sore throat, pain, runny nose, dysphagia  CV:  No pain, palpitations, hypo/hypertension  Resp:  No dyspnea, cough, tachypnea, wheezing  GI:  See HPI  :  No pain, bleeding, incontinence, nocturia  Muscle:  No pain, weakness  Neuro:  No weakness, tingling, memory problems  Psych:  No fatigue, insomnia, mood problems, depression  Endocrine:  No polyuria, polydipsia, cold/heat intolerance  Heme:  No petechiae, ecchymosis, easy bruisability  Skin:  No rash, edema      PHYSICAL EXAM:     GENERAL:  NAD  CHEST:  Full & symmetric excursion  HEART:  Regular rhythm, no abdominal bruit, no edema  ABDOMEN:  Soft, non-tender, non-distended, normoactive bowel sounds,  no masses , no hepatosplenomegaly, suprapubic catheter  EXTREMITIES:  no cyanosis,clubbing or edema  SKIN:  No rash/erythema/ecchymoses/petechiae/wounds/abscess/warm/dry  NEURO:  Alert  Rectal: dark maroon stool    Vital Signs:  Vital Signs Last 24 Hrs  T(C): 36.4 (17 Jun 2021 08:00), Max: 36.8 (16 Jun 2021 20:34)  T(F): 97.5 (17 Jun 2021 08:00), Max: 98.2 (16 Jun 2021 20:34)  HR: 59 (17 Jun 2021 08:00) (59 - 76)  BP: 121/59 (17 Jun 2021 08:00) (110/59 - 136/90)  BP(mean): 83 (17 Jun 2021 08:00) (76 - 108)  RR: 19 (17 Jun 2021 08:00) (15 - 36)  SpO2: 100% (17 Jun 2021 08:00) (90% - 100%)  Daily Height in cm: 180.34 (16 Jun 2021 20:34)    Daily     LABS:                        6.9    6.40  )-----------( 92       ( 17 Jun 2021 03:06 )             21.9     06-16    140  |  112<H>  |  50<H>  ----------------------------<  113<H>  4.2   |  12<L>  |  2.61<H>    Ca    7.5<L>      16 Jun 2021 22:15  Phos  4.3     06-16  Mg     1.6     06-16    TPro  5.2<L>  /  Alb  1.9<L>  /  TBili  0.7  /  DBili  x   /  AST  15  /  ALT  5<L>  /  AlkPhos  50  06-16    LIVER FUNCTIONS - ( 16 Jun 2021 22:15 )  Alb: 1.9 g/dL / Pro: 5.2 g/dL / ALK PHOS: 50 U/L / ALT: 5 U/L / AST: 15 U/L / GGT: x           PT/INR - ( 16 Jun 2021 23:39 )   PT: 14.6 sec;   INR: 1.23 ratio         PTT - ( 16 Jun 2021 23:39 )  PTT:30.9 sec        Imaging:           Chief Complaint:  Patient is a 81y old  Male who presents with a chief complaint of GIB (17 Jun 2021 05:04)      HPI:  The patient is a 81M PMH thrombocytopenia, anemia, AAA, DVT s/p IVC filter, duodenal perforation 4/28/21 s/p amy patch plication and J-tube, H.pylori s/p triple therapy, DM, HTN, HLD, severe dementia (AAOx1 at baseline) recent admission for GIB now presenting again for repeat GIB. Pt first presented on 6/3/21 for SOB and AMS and was admitted to [place] from 6/3 to 6/15 to the ICU for symptomatic anemia and GI bleeding. EGD performed during that admission did not show any active bleeding nor blood in esophagus, stomach, or duodenum but did show a large deep ulcer in the anterior duodenal bulb and large flat ulcer over the posterior duodenal bulb without visible vessel. He also had a tagged RBC scan which was negative. He did not have any further episodes of bleeding and was discharged on 6/15 with a Hgb of 8.4. Pt returned later on the same day 6/15 with repeat GIB and was found to have a Hgb of 6.4. CTA A/P w/o active bleeding. Pt received 2u pRBC however Hgb did not improve (repeat 6.2). He was transfused an additional unit (3u pRBC total) and transferred to [facility] for further management.  He is demented and cannot provide history himself, history obtained from charts. He also had a CTA yesterday 6/16 which did not show any aorto-enteric fistulas or active bleeding.     On arrival her VSS.  hemoglobin 7.9 now 6.9 s/p one unit of blood pending repeat.  VS still stable.  BUN elevated but also with WALDO.  Patient is not really able to describe what is going on.    Allergies:  No Known Allergies      Home Medications:    Hospital Medications:  chlorhexidine 4% Liquid 1 Application(s) Topical <User Schedule>  insulin lispro (ADMELOG) corrective regimen sliding scale   SubCutaneous every 6 hours  pantoprazole Infusion 8 mG/Hr IV Continuous <Continuous>      PMHX/PSHX:  Thrombocytopenia    Anemia    Abdominal aortic aneurysm    Deep vein thrombosis (DVT)    Diabetes mellitus    Hypertension    Hyperlipidemia    Duodenal ulcer    S/P IVC filter    H/O: duodenal ulcer    H/O exploratory laparotomy        Family history:  Non-contributory    Social History: Unable to obtain due to dementia    ROS:     General:  No wt loss, fevers, chills, night sweats, fatigue,   Eyes:  Good vision, no reported pain  ENT:  No sore throat, pain, runny nose, dysphagia  CV:  No pain, palpitations, hypo/hypertension  Resp:  No dyspnea, cough, tachypnea, wheezing  GI:  See HPI  :  No pain, bleeding, incontinence, nocturia  Muscle:  No pain, weakness  Neuro:  No weakness, tingling, memory problems  Psych:  No fatigue, insomnia, mood problems, depression  Endocrine:  No polyuria, polydipsia, cold/heat intolerance  Heme:  No petechiae, ecchymosis, easy bruisability  Skin:  No rash, edema      PHYSICAL EXAM:     GENERAL:  NAD  HEENT: NCAT  CHEST:  Full & symmetric excursion  HEART:  Regular rhythm, no abdominal bruit, no edema  ABDOMEN:  Soft, non-tender, non-distended, normoactive bowel sounds,  no masses , no hepatosplenomegaly, suprapubic catheter  EXTREMITIES:  no cyanosis,clubbing or edema  SKIN:  No rash/erythema/ecchymoses/petechiae/wounds/abscess/warm/dry  NEURO:  Alert and oriented x 1  PSYCH: Normal affect, calm  Rectal: dark maroon stool    Vital Signs:  Vital Signs Last 24 Hrs  T(C): 36.4 (17 Jun 2021 08:00), Max: 36.8 (16 Jun 2021 20:34)  T(F): 97.5 (17 Jun 2021 08:00), Max: 98.2 (16 Jun 2021 20:34)  HR: 59 (17 Jun 2021 08:00) (59 - 76)  BP: 121/59 (17 Jun 2021 08:00) (110/59 - 136/90)  BP(mean): 83 (17 Jun 2021 08:00) (76 - 108)  RR: 19 (17 Jun 2021 08:00) (15 - 36)  SpO2: 100% (17 Jun 2021 08:00) (90% - 100%)  Daily Height in cm: 180.34 (16 Jun 2021 20:34)    Daily     LABS:                        6.9    6.40  )-----------( 92       ( 17 Jun 2021 03:06 )             21.9     06-16    140  |  112<H>  |  50<H>  ----------------------------<  113<H>  4.2   |  12<L>  |  2.61<H>    Ca    7.5<L>      16 Jun 2021 22:15  Phos  4.3     06-16  Mg     1.6     06-16    TPro  5.2<L>  /  Alb  1.9<L>  /  TBili  0.7  /  DBili  x   /  AST  15  /  ALT  5<L>  /  AlkPhos  50  06-16    LIVER FUNCTIONS - ( 16 Jun 2021 22:15 )  Alb: 1.9 g/dL / Pro: 5.2 g/dL / ALK PHOS: 50 U/L / ALT: 5 U/L / AST: 15 U/L / GGT: x           PT/INR - ( 16 Jun 2021 23:39 )   PT: 14.6 sec;   INR: 1.23 ratio         PTT - ( 16 Jun 2021 23:39 )  PTT:30.9 sec

## 2021-06-17 NOTE — PROGRESS NOTE ADULT - ATTENDING COMMENTS
Pt is a 82 yo BM with h/o HTN, HLD, DM,  thrombocytopenia, anemia, AAA, DVT s/p IVC filter, severe dementia (AAOx1 at baseline), duodenal perforation 4/28/21 s/p amy patch plication and J-tube, H.pylori s/p triple therapy recent admission for GIB now presenting again for repeat GIB. Pt first presented on 6/3/21 for SOB and AMS and was admitted to [facility] from 6/3 to 6/15 to the ICU for symptomatic anemia and GI bleeding. EGD performed during that admission did not show any active bleeding nor blood in esophagus, stomach, or duodenum but did show a large deep ulcer in the anterior duodenal bulb and large flat ulcer over the posterior duodenal bulb without visible vessel. Pt also had a tagged RBC scan which was negative. Pt did not have any further episodes of bleeding and was discharged on 6/15 with a Hgb of 8.4. Pt returned later on the same day 6/15 with repeat GIB and was found to have a Hgb of 6.4. CTA A/P without active bleeding. Pt received 2u pRBC however Hgb did not improve (repeat 6.2). Pt was transfused an additional unit (3u pRBC total) and transferred to [facility]. ICU dx: Acute blood loss anemia 2 to UGIB    Resp:  ID:  CVS:  HEME: Most recent Hb 7.0; transfuse PRBC  FEN: NPO  GI: PPI/ EGD by GI today  Neuro: MS ? at baseline Pt is a 80 yo BM with h/o HTN, HLD, DM,  thrombocytopenia, anemia, AAA, DVT s/p IVC filter, severe dementia (AAOx1 at baseline), duodenal perforation 4/28/21 s/p amy patch plication and J-tube, H.pylori s/p triple therapy recent admission for GIB now presenting again for repeat GIB. Pt first presented on 6/3/21 for SOB and AMS and was admitted to [place] from 6/3 to 6/15 to the ICU for symptomatic anemia and GI bleeding. EGD performed during that admission did not show any active bleeding nor blood in esophagus, stomach, or duodenum but did show a large deep ulcer in the anterior duodenal bulb and large flat ulcer over the posterior duodenal bulb without visible vessel. Pt also had a tagged RBC scan which was negative. Pt did not have any further episodes of bleeding and was discharged on 6/15 with a Hgb of 8.4. Pt returned later on the same day 6/15 with repeat GIB and was found to have a Hgb of 6.4. CTA A/P without active bleeding. Pt received 2u pRBC however Hgb did not improve (repeat 6.2). Pt was transfused an additional unit (3u pRBC total) and transferred to [facility]. ICU dx: Acute blood loss anemia 2 to UGIB    Resp: Supplemental O2 prn  CVS: Hold pt's antiHTN meds  HEME: Most recent Hb 7.0; transfuse PRBC and trend Hb  FEN: NPO  GI: PPI/ EGD by GI today  Endo: Follow Glu and cover with Lispro  Neuro: MS ? at baseline

## 2021-06-18 NOTE — PROGRESS NOTE ADULT - ASSESSMENT
81M PMH thrombocytopenia, anemia, AAA, DVT s/p IVC filter, duodenal perforation 4/28/21 s/p amy patch plication and J-tube, H.pylori s/p triple therapy, DM, HTN, HLD, severe dementia (AAOx1 at baseline), CKD, recent admission 6/3-6/15 for GIB now admitted again for repeat GIB.    NEURO  - AAOx2 to name and "hospital", at baseline    PULM  - JANE    CV  #hx HTN, HLD  - hold PO meds  - baseline SBP 130s, monitor BP    GI  #UGI bleed possibly 2/2 duodenal ulcer  - s/p perforated duodenal ulcer s/p amy patch plication with J tube 4/28/21  - CTA A/P 6/15 negative for active bleeding. Recent EGD and tagged RBC scan in early 6/2021 w/o source of bleeding but did note large deep ulcer in the anterior duodenal bulb and large flat ulcer over the posterior duodenal bulb without visible vessel.  - GI and surgery following  - may need IR consult for angiographic emobolization if continues to bleed profusely  - continue PPI gtt  - EGD showing numerous nonbleeding duodenal ulcers  - possible colonoscopy     RENAL  #CKD  - SCr 2.5, at baseline  - monitor uop    ENDO  #DM  - ISS q6h, monitor FSG    ID  - JANE    HEME  # acute blood loss anemia 2/2 UGIB  - GIB plan as above  - s/p 3u pRBC at OSH and additional 3u pRBC at [facility], transfuse for Hgb<7  - monitor CBC q8h    #hx DVT  - s/p IVC filter  - SCDs, no antithrombotics    #thrombocytopenia  - monitor plts, transfuse for plts<50    ETHICS  - full code

## 2021-06-18 NOTE — PROGRESS NOTE ADULT - SUBJECTIVE AND OBJECTIVE BOX
CHIEF COMPLAINT: GIB    Interval Events: overnight pt had 2 loose black BMs mixed with red blood. 1u pRBCs ordered for dropping Hg    REVIEW OF SYSTEMS:  Pt endorses diffuse lower abdominal pain and diarrhea. No fever/chills, CP, palpitations, SOB, or rashes/bruising     OBJECTIVE:  ICU Vital Signs Last 24 Hrs  T(C): 36.5 (18 Jun 2021 04:00), Max: 36.5 (18 Jun 2021 00:00)  T(F): 97.7 (18 Jun 2021 04:00), Max: 97.7 (18 Jun 2021 00:00)  HR: 65 (18 Jun 2021 06:00) (56 - 71)  BP: 116/65 (18 Jun 2021 06:00) (95/51 - 181/74)  BP(mean): 85 (18 Jun 2021 06:00) (68 - 115)  ABP: --  ABP(mean): --  RR: 17 (18 Jun 2021 06:00) (13 - 48)  SpO2: 100% (18 Jun 2021 06:00) (98% - 100%)        06-17 @ 07:01  -  06-18 @ 07:00  --------------------------------------------------------  IN: 1040 mL / OUT: 1235 mL / NET: -195 mL      CAPILLARY BLOOD GLUCOSE      POCT Blood Glucose.: 101 mg/dL (18 Jun 2021 05:40)      PHYSICAL EXAM:  General: NAD, lying comfortably in bed  HEENT: PERRLA, EOMI, moist mucous membranes  Neurology: A&Ox2, nonfocal, MACEDO x 4  Respiratory: decreased breath sounds, normal respiratory effort, no wheezes, crackles, rales  CV: RRR, S1S2, no murmurs, rubs or gallops  Abdominal: +mild b/l lower abd tenderness. Soft, NT, ND +BS, +mid abdominal scar, LUQ J-tube  Extremities: +pedal edema to mid calves, + peripheral pulses     LINES:    HOSPITAL MEDICATIONS:  MEDICATIONS  (STANDING):  chlorhexidine 4% Liquid 1 Application(s) Topical <User Schedule>  dextrose 5% + lactated ringers. 1000 milliLiter(s) (75 mL/Hr) IV Continuous <Continuous>  insulin lispro (ADMELOG) corrective regimen sliding scale   SubCutaneous every 6 hours  pantoprazole Infusion 8 mG/Hr (10 mL/Hr) IV Continuous <Continuous>    MEDICATIONS  (PRN):      LABS:                        7.0    4.64  )-----------( 86       ( 18 Jun 2021 05:46 )             22.5     06-18    142  |  115<H>  |  52<H>  ----------------------------<  89  3.9   |  15<L>  |  2.80<H>    Ca    7.1<L>      18 Jun 2021 05:46  Phos  4.0     06-18  Mg     1.8     06-18    TPro  5.1<L>  /  Alb  1.9<L>  /  TBili  0.4  /  DBili  x   /  AST  22  /  ALT  11  /  AlkPhos  49  06-17    PT/INR - ( 17 Jun 2021 22:44 )   PT: 13.6 sec;   INR: 1.14 ratio         PTT - ( 17 Jun 2021 22:44 )  PTT:31.1 sec    Arterial Blood Gas:  06-16 @ 23:35  7.40/29/123/17/99/-6.2  ABG lactate: --    Venous Blood Gas:  06-17 @ 22:38  7.32/39/34/20/63  VBG Lactate: 0.8      MICROBIOLOGY:     RADIOLOGY:  [ x ] Reviewed and interpreted by me    EKG:

## 2021-06-18 NOTE — PROGRESS NOTE ADULT - ATTENDING COMMENTS
Patient seen and examined, agree with above. Bleeding continues and H/H is dropping. Highly doubt the clean based ulcers (on 2 EGDs now, one yesterday, the other at OSH) is cause of continued bleeding. Would perform colonoscopy today, and if negative will repeat EGD with endoscopically deployed capsule. Continue PPI for ulcers meanwhile.

## 2021-06-18 NOTE — CHART NOTE - NSCHARTNOTEFT_GEN_A_CORE
First Malnutrition f/u    Initial Nutrition Assessment completed yesterday 6/17 w/ dx of severe malnutrition. Pt currently NPO. Continued melena. Pt has J tube, when tube feeds can be initiated, recommend Vital 1.5 goal 85cc/hr x 18 hrs to provide 2295 kcals, 103gm protein, 1169cc free water for 25 kcals/kg, 1.1 protein/kg  dosing wt of 90.1 kg. Po per GI recs. Will f/u on NPO status.

## 2021-06-18 NOTE — PROGRESS NOTE ADULT - SUBJECTIVE AND OBJECTIVE BOX
Chief Complaint:  Patient is a 81y old  Male who presents with a chief complaint of GIB (18 Jun 2021 07:54)      Interval Events:   Patient continues to have dropping hemoglobin despite EGD with no active bleed seen but multiple healing ulcers     Allergies:  No Known Allergies      Hospital Medications:  chlorhexidine 4% Liquid 1 Application(s) Topical <User Schedule>  dextrose 5% + lactated ringers. 1000 milliLiter(s) IV Continuous <Continuous>  insulin lispro (ADMELOG) corrective regimen sliding scale   SubCutaneous every 6 hours  pantoprazole Infusion 8 mG/Hr IV Continuous <Continuous>      PMHX/PSHX:  Thrombocytopenia    Anemia    Abdominal aortic aneurysm    Deep vein thrombosis (DVT)    Diabetes mellitus    Hypertension    Hyperlipidemia    Duodenal ulcer    S/P IVC filter    H/O: duodenal ulcer    H/O exploratory laparotomy        Family history:      ROS:  General: no night sweats, wt loss  CV: no pain, palpitation  Resp: no cough wheezing  Muscles: no weakness or pain  Endocrine: no polyuria, polydipsia, cold/heat intolerance  Heme: No petechia, ecchymosis    PHYSICAL EXAM:   GENERAL:  NAD  HEENT:  NC/AT,  conjunctivae clear, sclera -anicteric  CHEST:  Full & symmetric excursion, no increased  HEART:  Regular rhythm  ABDOMEN:  Soft, non-tender, non-distended, normoactive bowel sounds,  no masses ,no hepato-splenomegaly,   EXTREMITIES:  no cyanosis,clubbing or edema  SKIN:  No rash/erythema/ecchymoses/petechiae/wounds/abscess/warm/dry  NEURO:  Alert, oriented    Vital Signs:  Vital Signs Last 24 Hrs  T(C): 36.5 (18 Jun 2021 04:00), Max: 36.5 (18 Jun 2021 00:00)  T(F): 97.7 (18 Jun 2021 04:00), Max: 97.7 (18 Jun 2021 00:00)  HR: 65 (18 Jun 2021 06:00) (56 - 71)  BP: 116/65 (18 Jun 2021 06:00) (95/51 - 181/74)  BP(mean): 85 (18 Jun 2021 06:00) (68 - 115)  RR: 17 (18 Jun 2021 06:00) (13 - 48)  SpO2: 100% (18 Jun 2021 06:00) (98% - 100%)  Daily     Daily     LABS:                        7.0    4.64  )-----------( 86       ( 18 Jun 2021 05:46 )             22.5     06-18    142  |  115<H>  |  52<H>  ----------------------------<  89  3.9   |  15<L>  |  2.80<H>    Ca    7.1<L>      18 Jun 2021 05:46  Phos  4.0     06-18  Mg     1.8     06-18    TPro  5.1<L>  /  Alb  1.9<L>  /  TBili  0.4  /  DBili  x   /  AST  22  /  ALT  11  /  AlkPhos  49  06-17    LIVER FUNCTIONS - ( 17 Jun 2021 22:44 )  Alb: 1.9 g/dL / Pro: 5.1 g/dL / ALK PHOS: 49 U/L / ALT: 11 U/L / AST: 22 U/L / GGT: x           PT/INR - ( 17 Jun 2021 22:44 )   PT: 13.6 sec;   INR: 1.14 ratio         PTT - ( 17 Jun 2021 22:44 )  PTT:31.1 sec        Imaging:             Chief Complaint:  Patient is a 81y old  Male who presents with a chief complaint of GIB (18 Jun 2021 07:54)      Interval Events:   Patient continues to have dropping hemoglobin despite EGD with no active bleed seen but multiple healing ulcers.  Patient with period of hypotension this morning.  Continued melena.    Allergies:  No Known Allergies      Hospital Medications:  chlorhexidine 4% Liquid 1 Application(s) Topical <User Schedule>  dextrose 5% + lactated ringers. 1000 milliLiter(s) IV Continuous <Continuous>  insulin lispro (ADMELOG) corrective regimen sliding scale   SubCutaneous every 6 hours  pantoprazole Infusion 8 mG/Hr IV Continuous <Continuous>      PMHX/PSHX:  Thrombocytopenia    Anemia    Abdominal aortic aneurysm    Deep vein thrombosis (DVT)    Diabetes mellitus    Hypertension    Hyperlipidemia    Duodenal ulcer    S/P IVC filter    H/O: duodenal ulcer    H/O exploratory laparotomy        Family history:      ROS:  General: no night sweats, wt loss  CV: no pain, palpitation  Resp: no cough wheezing  Muscles: no weakness or pain  Endocrine: no polyuria, polydipsia, cold/heat intolerance  Heme: No petechia, ecchymosis    PHYSICAL EXAM:   GENERAL:  NAD  HEENT:  NC/AT,  conjunctivae clear, sclera -anicteric  CHEST:  Full & symmetric excursion, no increased  HEART:  Regular rhythm  ABDOMEN:  Soft, non-tender, non-distended, normoactive bowel sounds,  no masses ,no hepato-splenomegaly,   EXTREMITIES:  no cyanosis,clubbing or edema  SKIN:  No rash/erythema/ecchymoses/petechiae/wounds/abscess/warm/dry  NEURO:  Alert, oriented    Vital Signs:  Vital Signs Last 24 Hrs  T(C): 36.5 (18 Jun 2021 04:00), Max: 36.5 (18 Jun 2021 00:00)  T(F): 97.7 (18 Jun 2021 04:00), Max: 97.7 (18 Jun 2021 00:00)  HR: 65 (18 Jun 2021 06:00) (56 - 71)  BP: 116/65 (18 Jun 2021 06:00) (95/51 - 181/74)  BP(mean): 85 (18 Jun 2021 06:00) (68 - 115)  RR: 17 (18 Jun 2021 06:00) (13 - 48)  SpO2: 100% (18 Jun 2021 06:00) (98% - 100%)  Daily     Daily     LABS:                        7.0    4.64  )-----------( 86       ( 18 Jun 2021 05:46 )             22.5     06-18    142  |  115<H>  |  52<H>  ----------------------------<  89  3.9   |  15<L>  |  2.80<H>    Ca    7.1<L>      18 Jun 2021 05:46  Phos  4.0     06-18  Mg     1.8     06-18    TPro  5.1<L>  /  Alb  1.9<L>  /  TBili  0.4  /  DBili  x   /  AST  22  /  ALT  11  /  AlkPhos  49  06-17    LIVER FUNCTIONS - ( 17 Jun 2021 22:44 )  Alb: 1.9 g/dL / Pro: 5.1 g/dL / ALK PHOS: 49 U/L / ALT: 11 U/L / AST: 22 U/L / GGT: x           PT/INR - ( 17 Jun 2021 22:44 )   PT: 13.6 sec;   INR: 1.14 ratio         PTT - ( 17 Jun 2021 22:44 )  PTT:31.1 sec        Imaging:             Chief Complaint:  Patient is a 81y old  Male who presents with a chief complaint of GIB (18 Jun 2021 07:54)      Interval Events:   Patient continues to have dropping hemoglobin despite EGD with no active bleed seen but multiple healing ulcers.  Patient with period of hypotension this morning.  Continued melena.    Allergies:  No Known Allergies      Hospital Medications:  chlorhexidine 4% Liquid 1 Application(s) Topical <User Schedule>  dextrose 5% + lactated ringers. 1000 milliLiter(s) IV Continuous <Continuous>  insulin lispro (ADMELOG) corrective regimen sliding scale   SubCutaneous every 6 hours  pantoprazole Infusion 8 mG/Hr IV Continuous <Continuous>      PMHX/PSHX:  Thrombocytopenia    Anemia    Abdominal aortic aneurysm    Deep vein thrombosis (DVT)    Diabetes mellitus    Hypertension    Hyperlipidemia    Duodenal ulcer    S/P IVC filter    H/O: duodenal ulcer    H/O exploratory laparotomy        Family history:      ROS:  General: no night sweats, wt loss  CV: no pain, palpitation  Resp: no cough wheezing  Muscles: no weakness or pain  Endocrine: no polyuria, polydipsia, cold/heat intolerance  Heme: No petechia, ecchymosis    PHYSICAL EXAM:   GENERAL:  NAD  HEENT:  NC/AT,  conjunctivae clear, sclera -anicteric  CHEST:  Full & symmetric excursion, no increased  HEART:  Regular rhythm  ABDOMEN:  Soft, non-tender, non-distended, normoactive bowel sounds,  no masses ,no hepato-splenomegaly,   RECTAL: Very dark, reddish stool  EXTREMITIES:  no cyanosis,clubbing or edema  SKIN:  No rash/erythema/ecchymoses/petechiae/wounds/abscess/warm/dry  NEURO:  Alert, oriented x 1-2    Vital Signs:  Vital Signs Last 24 Hrs  T(C): 36.5 (18 Jun 2021 04:00), Max: 36.5 (18 Jun 2021 00:00)  T(F): 97.7 (18 Jun 2021 04:00), Max: 97.7 (18 Jun 2021 00:00)  HR: 65 (18 Jun 2021 06:00) (56 - 71)  BP: 116/65 (18 Jun 2021 06:00) (95/51 - 181/74)  BP(mean): 85 (18 Jun 2021 06:00) (68 - 115)  RR: 17 (18 Jun 2021 06:00) (13 - 48)  SpO2: 100% (18 Jun 2021 06:00) (98% - 100%)  Daily     Daily     LABS:                        7.0    4.64  )-----------( 86       ( 18 Jun 2021 05:46 )             22.5     06-18    142  |  115<H>  |  52<H>  ----------------------------<  89  3.9   |  15<L>  |  2.80<H>    Ca    7.1<L>      18 Jun 2021 05:46  Phos  4.0     06-18  Mg     1.8     06-18    TPro  5.1<L>  /  Alb  1.9<L>  /  TBili  0.4  /  DBili  x   /  AST  22  /  ALT  11  /  AlkPhos  49  06-17    LIVER FUNCTIONS - ( 17 Jun 2021 22:44 )  Alb: 1.9 g/dL / Pro: 5.1 g/dL / ALK PHOS: 49 U/L / ALT: 11 U/L / AST: 22 U/L / GGT: x           PT/INR - ( 17 Jun 2021 22:44 )   PT: 13.6 sec;   INR: 1.14 ratio         PTT - ( 17 Jun 2021 22:44 )  PTT:31.1 sec        Imaging:

## 2021-06-18 NOTE — CHART NOTE - NSCHARTNOTEFT_GEN_A_CORE
Patient lethargic, H/H not correcting appropriately w/ transfusion, hypotensive this am s/p 2 melanotic stools, EGD yesterday with numerous ulcers however no concern for high risk bleeding stigmata per GI, concern still for ongoing GIB requiring urgent evaluation/rapid prep. Patient unable to tolerate PO, in need of NGT, attempted placement patient extremely resistant, agitated, threatening staff, threatening that he will pull out NGT even if placed. Patient deemed to have no capacity, spoke to patient's HCP wife Melissa Burns regarding indication for NGT for rapid prep for PM colonoscopy, wife in agreement with plan for NGT and consented to anti-anxiolytic if needed during NGT placement for patient. Consent witnessed and confirmed w/ wife by LANA Hale at the bedside.     Christian Arguello M.D.  Internal Medicine PGY-1  [phone] / [phone]

## 2021-06-18 NOTE — PROGRESS NOTE ADULT - ATTENDING COMMENTS
Pt is a 82 yo BM with h/o HTN, HLD, DM,  thrombocytopenia, anemia, AAA, DVT s/p IVC filter, severe dementia (AAOx1 at baseline), duodenal perforation 4/28/21 s/p amy patch plication and J-tube, H.pylori s/p triple therapy recent admission for GIB now presenting again for repeat GIB. Pt first presented on 6/3/21 for SOB and AMS and was admitted to [facility] from 6/3 to 6/15 to the ICU for symptomatic anemia and GI bleeding. EGD performed during that admission did not show any active bleeding nor blood in esophagus, stomach, or duodenum but did show a large deep ulcer in the anterior duodenal bulb and large flat ulcer over the posterior duodenal bulb without visible vessel. Pt also had a tagged RBC scan which was negative. Pt did not have any further episodes of bleeding and was discharged on 6/15 with a Hgb of 8.4. Pt returned later on the same day 6/15 with repeat GIB and was found to have a Hgb of 6.4. CTA A/P without active bleeding. Pt received 2u pRBC however Hgb did not improve (repeat 6.2). Pt was transfused an additional unit (3u pRBC total) and transferred to [facility]. ICU dx: Acute blood loss anemia 2 to UGIB. Yesterday pt s/p EGD with multiple healing ulcers no active source of bleed. This am pt hypotensive with melena and decrease in Hb; hypovolemic hemorrhagic shock       Resp: Supplemental O2 prn  CVS: Hold pt's antiHTN meds  HEME: Most recent Hb 6.6; transfuse PRBC and trend Hb  FEN: NPO/ IVF with D5LR  GI: PPI/ GI f/u bowel prep and then EGD + colonoscopy  Endo: Follow Glu and cover with Lispro  Neuro: MS ? at baseline  Social: Pt's wife notified.

## 2021-06-18 NOTE — PROGRESS NOTE ADULT - ASSESSMENT
Impression:  81M PMH thrombocytopenia, anemia, AAA, DVT s/p IVC filter, duodenal perforation 4/28/21 s/p amy patch plication and J-tube, H.pylori s/p triple therapy, DM, HTN, HLD, severe dementia (AAOx1 at baseline) recent admission for GIB at OSH  #GIB - with continued dropping hemoglobin and blood in stool s/p EGD with multiple healing ulcers no active source of bleed now with continued dropping hemoglobin    Recommendations:   - IV PPI gtt   - monitor hemoglobin, repeat now   - transfuse as needed   - two active IVs at all times   - active type and screen   - is likely going to need colonoscopy, timing to be discussed further    Annette Coreas  Gastroenterology Fellow  Pager x [phone] or [phone]  Please page on-call Fellow on weekends/holidays or after 5 pm and before 8 am on weekdays   On-call GI fellow can be contacted via the answering service ([phone]) Impression:  81M PMH thrombocytopenia, anemia, AAA, DVT s/p IVC filter, duodenal perforation 4/28/21 s/p amy patch plication and J-tube, H.pylori s/p triple therapy, DM, HTN, HLD, severe dementia (AAOx1 at baseline) recent admission for GIB at OSH  #GIB - with continued dropping hemoglobin and blood in stool s/p EGD with multiple healing ulcers no active source of bleed now with continued dropping hemoglobin    Recommendations:   - IV PPI gtt   - monitor hemoglobin, repeat now   - transfuse as needed   - two active IVs at all times   - active type and screen   - Rapid bowel prep for colonoscopy +/- repeat EGD and endoscopically deployed capsule today    Annette Coreas  Gastroenterology Fellow  Pager x [phone] or [phone]  Please page on-call Fellow on weekends/holidays or after 5 pm and before 8 am on weekdays   On-call GI fellow can be contacted via the [employer] service ([phone])

## 2021-06-19 NOTE — PROGRESS NOTE ADULT - ATTENDING COMMENTS
Agree with above. GI bleeding with recent colonoscopy using blood throughout. Awaiting to review SB capsule to rule out SB source of GI bleed.

## 2021-06-19 NOTE — CONSULT NOTE ADULT - ATTENDING COMMENTS
bishnu-atn. no acute dialysis needs but anticipate needing it in next 24-48 hours     boone donahue  nephrology attending   Cell# [phone]   Office- [phone]

## 2021-06-19 NOTE — CHART NOTE - NSCHARTNOTEFT_GEN_A_CORE
Mr. Burns is a 80 yo with h/o HTN, HLD, DM, thrombocytopenia, anemia, AAA, DVT s/p IVC filter, severe dementia (AAOx1 at baseline), duodenal perforation 4/28/21 s/p amy patch plication and J-tube, H.pylori s/p triple therapy and recent admission for GIB now presenting for repeat GIB. Pt first presented on 6/3/21 for SOB and AMS and was admitted to [facility] from 6/3 to 6/15 to the ICU for symptomatic anemia and GI bleeding. EGD performed during that admission did not show any active bleeding nor blood in esophagus, stomach, or duodenum but did show a large deep ulcer in the anterior duodenal bulb and large flat ulcer over the posterior duodenal bulb without visible vessel. Pt also had a tagged RBC scan which was negative. Pt did not have any further episodes of bleeding and was discharged on 6/15 with a Hgb of 8.4. Pt returned later on the same day 6/15 with repeat GIB and was found to have a Hgb of 6.4. CTA A/P without active bleeding. Pt received 2u pRBC however Hgb did not improve (repeat 6.2). Pt was transfused an additional unit (3u pRBC total) and transferred to [facility]. Now s/p EGD with multiple healing ulcers no active source of bleed. Pt now in hypovolemic hemorrhagic shock requiring three pressors. Overnight he require a massive transfusion protocol (9 pRBC, 1plt, 1 FFP). Discussed with pt's daughter (Melissa Burns) the need for imaging with IV contrast to localize his bleeding. Explained the risk of further deterioration of renal function Is outweighed by the need to localize his bleed for possible IR intervention.     Nicholas Sharpe  Internal Medicine, PGY3  [facility] [phone]  [facility] [id number]

## 2021-06-19 NOTE — PROGRESS NOTE ADULT - ASSESSMENT
81M PMH thrombocytopenia, anemia, AAA, DVT s/p IVC filter, duodenal perforation 4/28/21 s/p amy patch plication and J-tube, H.pylori s/p triple therapy, DM, HTN, HLD, severe dementia (AAOx1 at baseline), CKD, recent admission 6/3-6/15 for GIB now admitted again for repeat GIB. Course complicated by hemorrhagic shock.     NEURO  - intubated/sedated with Fent and Ketamine   - Baseline advanced dementia, AAOx1    PULM  - Intubated for respiratory failure in setting of hemorrhagic shock and metabolic acidosis   - on minimal vent settings    CV  #hx HTN, HLD  - c/w Vaso, Levo, and Robin for hemorrhagic shock    GI  #GIB  - s/p perforated duodenal ulcer s/p amy patch plication with J tube 4/28/21  - CTA A/P 6/15 negative for active bleeding. Recent EGD and tagged RBC scan in early 6/2021 w/o source of bleeding but did note large deep ulcer in the anterior duodenal bulb and large flat ulcer over the posterior duodenal bulb without visible vessel.  - GI and surgery following  - continue PPI gtt  - EGD showing numerous nonbleeding duodenal ulcers  - capsule study in progress      RENAL  #CKD  - SCr 2.5, at baseline  - now with worsening renal function/anuric  - renal consulted for CRRT     ENDO  #DM  - ISS q6h, monitor FSG    ID  - empiric Vanc/Mariely (6/19-  )  - cultures pending     HEME  # acute blood loss anemia 2/2 UGIB  - GIB plan as above  - s/p MTP 6/19 PM  - monitor CBC q4h    #hx DVT  - s/p IVC filter  - SCDs, no antithrombotics    #thrombocytopenia  - monitor plts, transfuse for plts<50    ETHICS  - full code

## 2021-06-19 NOTE — PROVIDER CONTACT NOTE (OTHER) - ASSESSMENT
----- Message from JENNIFER Gamboa sent at 8/7/2020  8:18 AM CDT -----  Patients fasting sugar elevated at 177    Triglycerides high at 299, HDL low at 34. Has the patient been taking Lipitor 10 mg daily? Missed doses? How has the patient's diet been?     A1C up to 8.3, was 6.7 eight months ago. Please verify patient's current Diabetic medications. Has he been taking the medication consistently? How has the patient's diet been?  
Advised patient to start glipizide 2.5mg daily and continue all other medications as prescribed. Urged Pt about importance of taking diabetic medications and taking daily sugars to prevet hyperglycemic events. Encouraged Pt to eat diabetic-conscious meals. He is agreeable to diabetic counseling. Orders placed for service to diabetic counceling, f/u with flp/cmp in 8 weeks, call with sugar readings in 2 weeks. Advised patient to seek care at [facility] or ED if acute or worsening symptoms occur. He is agreeable to plan and verbalizes understanding. E-script sent to preferred pharmacy.  
Left message for patient to return call.  
Patient notified of information below. He states he may have missed several doses of his Lipitor and metformin due to working 3rd shift and being \"so tired he just comes home and goes to sleep.\" He is only eating 1x daily. He notes he has increased sugar intake due to drinking soda at work to keep him awake. He c/o of recently having increased thirst, HA, trouble concentrating, fatigue. He denies weakness, SOB,blurred vision, frequent urination, recently rapid weight loss. Advised patient to seek care at [facility] or ED if acute or worsening symptoms occur. He is agreeable to plan and verbalizes understanding.  
Would recommend the patient start Glipizide 2.5 mg daily to be taken 30 minutes prior to meal. Continue Metformin 1000 mg bid with meals. The patient should check blood sugars daily and call with readings in 2 weeks, sooner if having any hypoglycemic episodes or new concerns.      Please encourage patient to eat diabetic friendly meals per day and small high protein snacks as well. Would patient like to meet with Diabetic Educator/Nutritionist to develop a daily meal plan? If agreeable please put in referral for educator and nutrition.      Recommend patient continue Lipitor 10 mg daily and repeat FLP and CMP in 8 weeks. Please encourage patient to take the medication each day he experience full efficacy.   
Pt sedated and intubated. Peripheral IV earlier used for pressors. Pressors changed to Central line prior to assessment by the day nurse. Pt's arm elevated.

## 2021-06-19 NOTE — CONSULT NOTE ADULT - ASSESSMENT
Pt. with WALDO on CKD in the setting of hemorrhagic shock    1, WALDO on CKD/ Renal failure- Baseline Scr ~ 2.5 as per provider hand-off and chart review. Now with hemodynamically mediated ATN in the setting of hemorrhagic shock. Pt. has received multiple blood products since admission and currently planned to undergo CTA to localize source of GIB. Pt. with worsening oliguric renal failure. Reviewed UA.    2, Metabolic acidosis in the setting of renal failure.     PLAN :  - Pt. with evidence of fluid overload, however O2 requirements have been stable and no hyperkalemia on serial BMPs. Will tentatively speak to family and consent for CRRT/CVVHD if needed overnight. Monitor BMP and UOP. Renal imaging once clinically stable.    If you have any questions, please feel free to contact me  Alfonso Meeks  Nephrology Fellow  [phone]  (After 5pm or on weekends please page the on-call fellow)

## 2021-06-19 NOTE — DISCHARGE NOTE PROVIDER - HOSPITAL COURSE
Mr. Burns is a 82y/o M with PMHx of thrombocytopenia, anemia, AAA, DVT s/p IVC filter, duodenal perforation 4/28/21 s/p amy patch plication and J-tube, H.pylori s/p triple therapy, DM, HTN, HLD, and severe dementia (AAOx1 at baseline) recent admission for GIB presenting again for repeat GIB. Pt first presented on 6/3/21 for SOB and AMS and was admitted to [place] from 6/3 to 6/15 to the ICU for symptomatic anemia and GI bleeding. EGD performed during that admission did not show any active bleeding nor blood in esophagus, stomach, or duodenum but did show a large deep ulcer in the anterior duodenal bulb and large flat ulcer over the posterior duodenal bulb without visible vessel. He also had a tagged RBC scan which was negative. He did not have any further episodes of bleeding and was discharged on 6/15 with a Hgb of 8.4. Pt returned later on the same day 6/15 with repeat GIB and was found to have a Hgb of 6.4. CTA A/P w/o active bleeding. Pt received 2u pRBC however Hgb did not improve (repeat 6.2). He was transfused an additional unit (3u pRBC total) and transferred to [facility] for further management.   Pt was admitted to the [facility] MICU 6/17 and received 2u pRBCs on 6/17. Pt underwent EGD showing numerous duodenal ulcers without bleeding on 6/17. On 6/18 pt required an additional 4u pRBCs during the day and had multiple melanotic stools. EDG/colonoscopy performed 6/18 with video capsule placement and saulo blood visualized throughout the colon. Mr. Burns is a 82y/o M with PMHx of thrombocytopenia, anemia, AAA, DVT s/p IVC filter, duodenal perforation 4/28/21 s/p amy patch plication and J-tube, H.pylori s/p triple therapy, DM, HTN, HLD, and severe dementia (AAOx1 at baseline) recent admission for GIB presenting again for repeat GIB. Pt first presented on 6/3/21 for SOB and AMS and was admitted to [place] from 6/3 to 6/15 to the ICU for symptomatic anemia and GI bleeding. EGD performed during that admission did not show any active bleeding nor blood in esophagus, stomach, or duodenum but did show a large deep ulcer in the anterior duodenal bulb and large flat ulcer over the posterior duodenal bulb without visible vessel. He also had a tagged RBC scan which was negative. He did not have any further episodes of bleeding and was discharged on 6/15 with a Hgb of 8.4. Pt returned later on the same day 6/15 with repeat GIB and was found to have a Hgb of 6.4. CTA A/P w/o active bleeding. Pt received 2u pRBC however Hgb did not improve (repeat 6.2). He was transfused an additional unit (3u pRBC total) and transferred to [facility] for further management.   Pt was admitted to the [facility] MICU 6/17 and received 2u pRBCs on 6/17. Pt underwent EGD showing numerous duodenal ulcers without bleeding on 6/17. On 6/18 pt required an additional 4u pRBCs during the day and had multiple melanotic stools. EDG/colonoscopy performed 6/18 with video capsule placement and saulo blood visualized throughout the colon. The evening of 6/18 pt developed worsening hemorrhagic shock requiring maximal dose of three vasopressors, massive transfusion protocol, and intubation for increased work of breathing in setting of metabolic acidosis. Mr. Burns is a 80y/o M with PMHx of thrombocytopenia, anemia, AAA, DVT s/p IVC filter, duodenal perforation 4/28/21 s/p amy patch plication and J-tube, H.pylori s/p triple therapy, DM, HTN, HLD, and severe dementia (AAOx1 at baseline) recent admission for GIB presenting again for repeat GIB. Pt first presented on 6/3/21 for SOB and AMS and was admitted to [place] from 6/3 to 6/15 to the ICU for symptomatic anemia and GI bleeding. EGD performed during that admission did not show any active bleeding nor blood in esophagus, stomach, or duodenum but did show a large deep ulcer in the anterior duodenal bulb and large flat ulcer over the posterior duodenal bulb without visible vessel. He also had a tagged RBC scan which was negative. He did not have any further episodes of bleeding and was discharged on 6/15 with a Hgb of 8.4. Pt returned later on the same day 6/15 with repeat GIB and was found to have a Hgb of 6.4. CTA A/P w/o active bleeding. Pt received 2u pRBC however Hgb did not improve (repeat 6.2). He was transfused an additional unit (3u pRBC total) and transferred to [facility] for further management.   Pt was admitted to the [facility] MICU 6/17 and received 2u pRBCs on 6/17. Pt underwent EGD showing numerous duodenal ulcers without bleeding on 6/17. On 6/18 pt required an additional 4u pRBCs during the day and had multiple melanotic stools. EDG/colonoscopy performed 6/18 with video capsule placement and saulo blood visualized throughout the colon. The evening of 6/18 pt developed worsening hemorrhagic shock requiring maximal dose of three vasopressors, massive transfusion protocol, and intubation for increased work of breathing in setting of metabolic acidosis. 6/19 capsule study was completed showing small bowel bleed. Mr. Burns is a 80y/o M with PMHx of thrombocytopenia, anemia, AAA, DVT s/p IVC filter, duodenal perforation 4/28/21 s/p amy patch plication and J-tube, H.pylori s/p triple therapy, DM, HTN, HLD, and severe dementia (AAOx1 at baseline) recent admission for GIB presenting again for repeat GIB. Pt first presented on 6/3/21 for SOB and AMS and was admitted to [place] from 6/3 to 6/15 to the ICU for symptomatic anemia and GI bleeding. EGD performed during that admission did not show any active bleeding nor blood in esophagus, stomach, or duodenum but did show a large deep ulcer in the anterior duodenal bulb and large flat ulcer over the posterior duodenal bulb without visible vessel. He also had a tagged RBC scan which was negative. He did not have any further episodes of bleeding and was discharged on 6/15 with a Hgb of 8.4. Pt returned later on the same day 6/15 with repeat GIB and was found to have a Hgb of 6.4. CTA A/P w/o active bleeding. Pt received 2u pRBC however Hgb did not improve (repeat 6.2). He was transfused an additional unit (3u pRBC total) and transferred to [facility] for further management.   Pt was admitted to the [facility] MICU 6/17 and received 2u pRBCs on 6/17. Pt underwent EGD showing numerous duodenal ulcers without bleeding on 6/17. On 6/18 pt required an additional 4u pRBCs during the day and had multiple melanotic stools. EDG/colonoscopy performed 6/18 with video capsule placement and saulo blood visualized throughout the colon. The evening of 6/18 pt developed worsening hemorrhagic shock requiring maximal dose of three vasopressors, massive transfusion protocol, and intubation for increased work of breathing in setting of metabolic acidosis. 6/19 capsule study was completed showing small bowel bleed.  Pt developed worsening renal function and nephrology was consulted for CRRT. Mr. Burns is a 82y/o M with PMHx of thrombocytopenia, anemia, AAA, DVT s/p IVC filter, duodenal perforation 4/28/21 s/p amy patch plication and J-tube, H.pylori s/p triple therapy, DM, HTN, HLD, and severe dementia (AAOx1 at baseline) recent admission for GIB presenting again for repeat GIB. Pt first presented on 6/3/21 for SOB and AMS and was admitted to [place] from 6/3 to 6/15 to the ICU for symptomatic anemia and GI bleeding. EGD performed during that admission did not show any active bleeding nor blood in esophagus, stomach, or duodenum but did show a large deep ulcer in the anterior duodenal bulb and large flat ulcer over the posterior duodenal bulb without visible vessel. He also had a tagged RBC scan which was negative. He did not have any further episodes of bleeding and was discharged on 6/15 with a Hgb of 8.4. Pt returned later on the same day 6/15 with repeat GIB and was found to have a Hgb of 6.4. CTA A/P w/o active bleeding. Pt received 2u pRBC however Hgb did not improve (repeat 6.2). He was transfused an additional unit (3u pRBC total) and transferred to [facility] for further management.   Pt was admitted to the [facility] MICU 6/17 and received 2u pRBCs on 6/17. Pt underwent EGD showing numerous duodenal ulcers without bleeding on 6/17. On 6/18 pt required an additional 4u pRBCs during the day and had multiple melanotic stools. EDG/colonoscopy performed 6/18 with video capsule placement and saulo blood visualized throughout the colon. The evening of 6/18 pt developed worsening hemorrhagic shock requiring maximal dose of three vasopressors, massive transfusion protocol, and intubation for increased work of breathing in setting of metabolic acidosis. 6/19 capsule study was completed showing small bowel bleed.  Pt developed worsening renal function and pt was started on CRRT for acidosis/fluid overload. US LE showed R femoral DVT. Palliative was consulted and [facility]

## 2021-06-19 NOTE — PROGRESS NOTE ADULT - ATTENDING COMMENTS
Hemorrhagic shock, unknown source    Vanc, Mariely  TTE  No AC due to bleed  Transfuse PRN for hgb <8 due to NSTEMI  CTA if stable enough  Protonix gtt  Renal consult  TItrate pressors as needed goal MAP >= 65 80 y/o M w/dementia?, chronic DVT s/p IVC filter, recent duodenal perforation s/p Jim patch admitted for acute blood loss anemia and hypotension likely secondary to hemorrhagic shock from GI bleed. Received massive transfusion protocol. Unknown source of bleed. EGD negative for etiology now awaiting results of capsule enterography. Intubated for acute respiratory failure. Likely NSTEMI in setting of anemia and hemorrhagic shock. WALDO liekly secondary to ATN.    - Sedation as needed goal RASS -1 to 0  - Continue mechanical ventilation, wean from vent as tolerated once clinically improved  - Wean pressors as tolerated. Goal MAP >= 65  - Transfuse PRN for hgb < 8 due to NSTEMI  - No AC due to bleed  - Follow up results of capsule enterography. If no source identified will get CTA if patient rebleeds. Benefit would outweigh risk of worsening kidney injury.  - Protonix gtt  - Trend Cr, avoid nephrotoxins, renal consult  - Empiric broad spectrum abx  - TTE  - Guarded prognosis

## 2021-06-19 NOTE — PROGRESS NOTE ADULT - SUBJECTIVE AND OBJECTIVE BOX
CHIEF COMPLAINT:    Interval Events:    REVIEW OF SYSTEMS:  Constitutional:   Eyes:  ENT:  CV:  Resp:  GI:  :  MSK:  Integumentary:  Neurological:  Psychiatric:  Endocrine:  Hematologic/Lymphatic:  Allergic/Immunologic:  [ ] All other systems negative  [ ] Unable to assess ROS because ________    OBJECTIVE:  ICU Vital Signs Last 24 Hrs  T(C): 37.7 (2021 05:00), Max: 37.7 (2021 05:00)  T(F): 99.9 (2021 05:00), Max: 99.9 (2021 05:00)  HR: 78 (2021 07:45) (47 - 93)  BP: 130/71 (2021 07:45) (59/22 - 200/89)  BP(mean): 93 (2021 07:45) (32 - 140)  ABP: 115/55 (2021 07:45) (60/36 - 225/80)  ABP(mean): 67 (2021 07:45) (45 - 153)  RR: 20 (2021 07:45) (16 - 48)  SpO2: 99% (2021 07:45) (61% - 100%)    Mode: AC/ CMV (Assist Control/ Continuous Mandatory Ventilation), RR (machine): 20, TV (machine): 500, FiO2: 30, PEEP: 5, ITime: 1, MAP: 9, PIP: 23    06-18 @ 07:01  -  06-19 @ 07:00  --------------------------------------------------------  IN: 7138.1 mL / OUT: 540 mL / NET: 6598.1 mL      CAPILLARY BLOOD GLUCOSE      POCT Blood Glucose.: 165 mg/dL (2021 05:15)      PHYSICAL EXAM:  General:   HEENT:   Lymph Nodes:  Neck:   Respiratory:   Cardiovascular:   Abdomen:   Extremities:   Skin:   Neurological:  Psychiatry:    LINES:    HOSPITAL MEDICATIONS:  MEDICATIONS  (STANDING):  acetaminophen  IVPB .. 1000 milliGRAM(s) IV Intermittent once  albumin human 25% IVPB 50 milliLiter(s) IV Intermittent every 6 hours  chlorhexidine 0.12% Liquid 15 milliLiter(s) Oral Mucosa every 12 hours  chlorhexidine 4% Liquid 1 Application(s) Topical <User Schedule>  fentaNYL   Infusion... 0.5 MICROgram(s)/kG/Hr (2.25 mL/Hr) IV Continuous <Continuous>  insulin lispro (ADMELOG) corrective regimen sliding scale   SubCutaneous every 6 hours  ketamine Infusion. 0.25 mG/kG/Hr (2.25 mL/Hr) IV Continuous <Continuous>  norepinephrine Infusion 0.05 MICROgram(s)/kG/Min (4.22 mL/Hr) IV Continuous <Continuous>  pantoprazole Infusion 8 mG/Hr (10 mL/Hr) IV Continuous <Continuous>  phenylephrine    Infusion 2 MICROgram(s)/kG/Min (33.8 mL/Hr) IV Continuous <Continuous>  piperacillin/tazobactam IVPB.. 3.375 Gram(s) IV Intermittent every 12 hours  vasopressin Infusion 0.04 Unit(s)/Min (2.4 mL/Hr) IV Continuous <Continuous>    MEDICATIONS  (PRN):  aluminum hydroxide/magnesium hydroxide/simethicone Suspension 30 milliLiter(s) Oral every 6 hours PRN Dyspepsia      LABS:                        9.5    15.37 )-----------( 75       ( 2021 03:45 )             29.3     06-    148<H>  |  119<H>  |  59<H>  ----------------------------<  193<H>  4.5   |  14<L>  |  2.93<H>    Ca    7.3<L>      2021 03:45  Phos  5.5     -  Mg     1.9     -    TPro  3.8<L>  /  Alb  1.6<L>  /  TBili  0.5  /  DBili  x   /  AST  22  /  ALT  8<L>  /  AlkPhos  36<L>      PT/INR - ( 2021 00:10 )   PT: 15.7 sec;   INR: 1.33 ratio         PTT - ( 2021 00:10 )  PTT:37.1 sec  Urinalysis Basic - ( 2021 21:44 )    Color: Yellow / Appearance: Slightly Turbid / S.023 / pH: x  Gluc: x / Ketone: Negative  / Bili: Negative / Urobili: Negative   Blood: x / Protein: 100 / Nitrite: Negative   Leuk Esterase: Moderate / RBC: 7 /hpf / WBC 15 /HPF   Sq Epi: x / Non Sq Epi: 6 / Bacteria: Negative      Arterial Blood Gas:   @ 03:40  7.26/40/100/17/98/-8.5  ABG lactate: --  Arterial Blood Gas:   @ 00:06  7.34/33/191/17/100/-7.4  ABG lactate: --  Arterial Blood Gas:   @ 19:29  7.19/44/101/16/97/-11.1  ABG lactate: --    Venous Blood Gas:   @ 18:42  7.14/25/54/8/82  VBG Lactate: 1.5  Venous Blood Gas:   @ 22:38  7.32/39/34/20/63  VBG Lactate: 0.8      MICROBIOLOGY:     RADIOLOGY:  [ ] Reviewed and interpreted by me    EKG: CHIEF COMPLAINT: GIB    Interval Events: Overnight pt had worsening hypotension requiring max dose on three vasopressors and massive transfusion protocol (cortis placed, 9u pRBC, 1 Plt, 1 FFP).     REVIEW OF SYSTEMS:     [ ] Unable to assess ROS because ________    OBJECTIVE:  ICU Vital Signs Last 24 Hrs  T(C): 37.7 (2021 05:00), Max: 37.7 (2021 05:00)  T(F): 99.9 (2021 05:00), Max: 99.9 (2021 05:00)  HR: 78 (2021 07:45) (47 - 93)  BP: 130/71 (2021 07:45) (59/22 - 200/89)  BP(mean): 93 (2021 07:45) (32 - 140)  ABP: 115/55 (2021 07:45) (60/36 - 225/80)  ABP(mean): 67 (2021 07:45) (45 - 153)  RR: 20 (2021 07:45) (16 - 48)  SpO2: 99% (2021 07:45) (61% - 100%)    Mode: AC/ CMV (Assist Control/ Continuous Mandatory Ventilation), RR (machine): 20, TV (machine): 500, FiO2: 30, PEEP: 5, ITime: 1, MAP: 9, PIP: 23    06-18 @ 07:01  -  06-19 @ 07:00  --------------------------------------------------------  IN: 7138.1 mL / OUT: 540 mL / NET: 6598.1 mL      CAPILLARY BLOOD GLUCOSE      POCT Blood Glucose.: 165 mg/dL (2021 05:15)      PHYSICAL EXAM:  General:   HEENT:   Lymph Nodes:  Neck:   Respiratory:   Cardiovascular:   Abdomen:   Extremities:   Skin:   Neurological:  Psychiatry:    LINES:    HOSPITAL MEDICATIONS:  MEDICATIONS  (STANDING):  acetaminophen  IVPB .. 1000 milliGRAM(s) IV Intermittent once  albumin human 25% IVPB 50 milliLiter(s) IV Intermittent every 6 hours  chlorhexidine 0.12% Liquid 15 milliLiter(s) Oral Mucosa every 12 hours  chlorhexidine 4% Liquid 1 Application(s) Topical <User Schedule>  fentaNYL   Infusion... 0.5 MICROgram(s)/kG/Hr (2.25 mL/Hr) IV Continuous <Continuous>  insulin lispro (ADMELOG) corrective regimen sliding scale   SubCutaneous every 6 hours  ketamine Infusion. 0.25 mG/kG/Hr (2.25 mL/Hr) IV Continuous <Continuous>  norepinephrine Infusion 0.05 MICROgram(s)/kG/Min (4.22 mL/Hr) IV Continuous <Continuous>  pantoprazole Infusion 8 mG/Hr (10 mL/Hr) IV Continuous <Continuous>  phenylephrine    Infusion 2 MICROgram(s)/kG/Min (33.8 mL/Hr) IV Continuous <Continuous>  piperacillin/tazobactam IVPB.. 3.375 Gram(s) IV Intermittent every 12 hours  vasopressin Infusion 0.04 Unit(s)/Min (2.4 mL/Hr) IV Continuous <Continuous>    MEDICATIONS  (PRN):  aluminum hydroxide/magnesium hydroxide/simethicone Suspension 30 milliLiter(s) Oral every 6 hours PRN Dyspepsia      LABS:                        9.5    15.37 )-----------( 75       ( 2021 03:45 )             29.3         148<H>  |  119<H>  |  59<H>  ----------------------------<  193<H>  4.5   |  14<L>  |  2.93<H>    Ca    7.3<L>      2021 03:45  Phos  5.5       Mg     1.9         TPro  3.8<L>  /  Alb  1.6<L>  /  TBili  0.5  /  DBili  x   /  AST  22  /  ALT  8<L>  /  AlkPhos  36<L>      PT/INR - ( 2021 00:10 )   PT: 15.7 sec;   INR: 1.33 ratio         PTT - ( 2021 00:10 )  PTT:37.1 sec  Urinalysis Basic - ( 2021 21:44 )    Color: Yellow / Appearance: Slightly Turbid / S.023 / pH: x  Gluc: x / Ketone: Negative  / Bili: Negative / Urobili: Negative   Blood: x / Protein: 100 / Nitrite: Negative   Leuk Esterase: Moderate / RBC: 7 /hpf / WBC 15 /HPF   Sq Epi: x / Non Sq Epi: 6 / Bacteria: Negative      Arterial Blood Gas:   @ 03:40  7.26/40/100/17/98/-8.5  ABG lactate: --  Arterial Blood Gas:   @ 00:06  7.34/33/191/17/100/-7.4  ABG lactate: --  Arterial Blood Gas:   @ 19:29  7.19/44/101/16/97/-11.1  ABG lactate: --    Venous Blood Gas:   @ 18:42  7.14/25/54/8/82  VBG Lactate: 1.5  Venous Blood Gas:   @ 22:38  7.32/39/34/20/63  VBG Lactate: 0.8      MICROBIOLOGY:     RADIOLOGY:  [ ] Reviewed and interpreted by me    EKG: CHIEF COMPLAINT: GIB    Interval Events: Overnight pt had worsening hypotension requiring max dose on three vasopressors and massive transfusion protocol (cortis placed, 9u pRBC, 1 Plt, 1 FFP). Pt intubated for respiratory failure insetting of metabolic acidosis    REVIEW OF SYSTEMS:     [ x ] Unable to assess ROS because intubation/sedation     OBJECTIVE:  ICU Vital Signs Last 24 Hrs  T(C): 37.7 (2021 05:00), Max: 37.7 (2021 05:00)  T(F): 99.9 (2021 05:00), Max: 99.9 (2021 05:00)  HR: 78 (2021 07:45) (47 - 93)  BP: 130/71 (2021 07:45) (59/22 - 200/89)  BP(mean): 93 (2021 07:45) (32 - 140)  ABP: 115/55 (2021 07:45) (60/36 - 225/80)  ABP(mean): 67 (2021 07:45) (45 - 153)  RR: 20 (2021 07:45) (16 - 48)  SpO2: 99% (2021 07:45) (61% - 100%)    Mode: AC/ CMV (Assist Control/ Continuous Mandatory Ventilation), RR (machine): 20, TV (machine): 500, FiO2: 30, PEEP: 5, ITime: 1, MAP: 9, PIP: 23    06-18 @ 07:01  -  06-19 @ 07:00  --------------------------------------------------------  IN: 7138.1 mL / OUT: 540 mL / NET: 6598.1 mL      CAPILLARY BLOOD GLUCOSE      POCT Blood Glucose.: 165 mg/dL (2021 05:15)      PHYSICAL EXAM:  General: intubated and sedated  HEENT: moist mucous membranes, no conjunctival erythema, pupils pinpoint b/l  Neurology: heavily sedated   Respiratory: course breath sounds b/l  CV: RRR, S1S2, no murmurs, rubs or gallops  Abdominal: Soft, NT, ND +BS, +mid abdominal scar, LUQ J-tube  Extremities: no LE edema    LINES: L fem a-line, [facility] MEDICATIONS:  MEDICATIONS  (STANDING):  acetaminophen  IVPB .. 1000 milliGRAM(s) IV Intermittent once  albumin human 25% IVPB 50 milliLiter(s) IV Intermittent every 6 hours  chlorhexidine 0.12% Liquid 15 milliLiter(s) Oral Mucosa every 12 hours  chlorhexidine 4% Liquid 1 Application(s) Topical <User Schedule>  fentaNYL   Infusion... 0.5 MICROgram(s)/kG/Hr (2.25 mL/Hr) IV Continuous <Continuous>  insulin lispro (ADMELOG) corrective regimen sliding scale   SubCutaneous every 6 hours  ketamine Infusion. 0.25 mG/kG/Hr (2.25 mL/Hr) IV Continuous <Continuous>  norepinephrine Infusion 0.05 MICROgram(s)/kG/Min (4.22 mL/Hr) IV Continuous <Continuous>  pantoprazole Infusion 8 mG/Hr (10 mL/Hr) IV Continuous <Continuous>  phenylephrine    Infusion 2 MICROgram(s)/kG/Min (33.8 mL/Hr) IV Continuous <Continuous>  piperacillin/tazobactam IVPB.. 3.375 Gram(s) IV Intermittent every 12 hours  vasopressin Infusion 0.04 Unit(s)/Min (2.4 mL/Hr) IV Continuous <Continuous>    MEDICATIONS  (PRN):  aluminum hydroxide/magnesium hydroxide/simethicone Suspension 30 milliLiter(s) Oral every 6 hours PRN Dyspepsia      LABS:                        9.5    15.37 )-----------( 75       ( 2021 03:45 )             29.3     -    148<H>  |  119<H>  |  59<H>  ----------------------------<  193<H>  4.5   |  14<L>  |  2.93<H>    Ca    7.3<L>      2021 03:45  Phos  5.5     -  Mg     1.9         TPro  3.8<L>  /  Alb  1.6<L>  /  TBili  0.5  /  DBili  x   /  AST  22  /  ALT  8<L>  /  AlkPhos  36<L>  06-19    PT/INR - ( 2021 00:10 )   PT: 15.7 sec;   INR: 1.33 ratio         PTT - ( 2021 00:10 )  PTT:37.1 sec  Urinalysis Basic - ( 2021 21:44 )    Color: Yellow / Appearance: Slightly Turbid / S.023 / pH: x  Gluc: x / Ketone: Negative  / Bili: Negative / Urobili: Negative   Blood: x / Protein: 100 / Nitrite: Negative   Leuk Esterase: Moderate / RBC: 7 /hpf / WBC 15 /HPF   Sq Epi: x / Non Sq Epi: 6 / Bacteria: Negative      Arterial Blood Gas:   @ 03:40  7.26/40/100/17/98/-8.5  ABG lactate: --  Arterial Blood Gas:   @ 00:06  7.34/33/191/17/100/-7.4  ABG lactate: --  Arterial Blood Gas:   @ 19:29  7.19/44/101/16/97/-11.1  ABG lactate: --    Venous Blood Gas:   @ 18:42  7.14/25/54/8/82  VBG Lactate: 1.5  Venous Blood Gas:   @ 22:38  7.32/39/34/20/63  VBG Lactate: 0.8      MICROBIOLOGY:     Culture - Sputum (collected 2021 00:26)  Source: .Sputum Sputum  Gram Stain (2021 03:30):    Rare polymorphonuclear leukocytes per low power field    Rare Squamous epithelial cells per low power field    Rare Gram Negative Diplococci per oil power field      RADIOLOGY:  [ ] Reviewed and interpreted by me    EKG: CHIEF COMPLAINT: GIB    Interval Events: Overnight pt had worsening hypotension requiring max dose on three vasopressors and massive transfusion protocol (cortis placed, 9u pRBC, 1 Plt, 1 FFP). Pt intubated for respiratory failure insetting of metabolic acidosis    REVIEW OF SYSTEMS:     [ x ] Unable to assess ROS because intubation/sedation     OBJECTIVE:  ICU Vital Signs Last 24 Hrs  T(C): 37.7 (2021 05:00), Max: 37.7 (2021 05:00)  T(F): 99.9 (2021 05:00), Max: 99.9 (2021 05:00)  HR: 78 (2021 07:45) (47 - 93)  BP: 130/71 (2021 07:45) (59/22 - 200/89)  BP(mean): 93 (2021 07:45) (32 - 140)  ABP: 115/55 (2021 07:45) (60/36 - 225/80)  ABP(mean): 67 (2021 07:45) (45 - 153)  RR: 20 (2021 07:45) (16 - 48)  SpO2: 99% (2021 07:45) (61% - 100%)    Mode: AC/ CMV (Assist Control/ Continuous Mandatory Ventilation), RR (machine): 20, TV (machine): 500, FiO2: 30, PEEP: 5, ITime: 1, MAP: 9, PIP: 23    06-18 @ 07:01  -  06-19 @ 07:00  --------------------------------------------------------  IN: 7138.1 mL / OUT: 540 mL / NET: 6598.1 mL      CAPILLARY BLOOD GLUCOSE      POCT Blood Glucose.: 165 mg/dL (2021 05:15)      PHYSICAL EXAM:  General: intubated and sedated  HEENT: moist mucous membranes, no conjunctival erythema, pupils pinpoint b/l  Neurology: heavily sedated   Respiratory: course breath sounds b/l  CV: RRR, S1S2, no murmurs, rubs or gallops  Abdominal: Soft, NT, ND +BS, +mid abdominal scar, LUQ J-tube  Extremities: +LE edema to mid shin on L, to knee on R    LINES: L fem a-line, [facility] MEDICATIONS:  MEDICATIONS  (STANDING):  acetaminophen  IVPB .. 1000 milliGRAM(s) IV Intermittent once  albumin human 25% IVPB 50 milliLiter(s) IV Intermittent every 6 hours  chlorhexidine 0.12% Liquid 15 milliLiter(s) Oral Mucosa every 12 hours  chlorhexidine 4% Liquid 1 Application(s) Topical <User Schedule>  fentaNYL   Infusion... 0.5 MICROgram(s)/kG/Hr (2.25 mL/Hr) IV Continuous <Continuous>  insulin lispro (ADMELOG) corrective regimen sliding scale   SubCutaneous every 6 hours  ketamine Infusion. 0.25 mG/kG/Hr (2.25 mL/Hr) IV Continuous <Continuous>  norepinephrine Infusion 0.05 MICROgram(s)/kG/Min (4.22 mL/Hr) IV Continuous <Continuous>  pantoprazole Infusion 8 mG/Hr (10 mL/Hr) IV Continuous <Continuous>  phenylephrine    Infusion 2 MICROgram(s)/kG/Min (33.8 mL/Hr) IV Continuous <Continuous>  piperacillin/tazobactam IVPB.. 3.375 Gram(s) IV Intermittent every 12 hours  vasopressin Infusion 0.04 Unit(s)/Min (2.4 mL/Hr) IV Continuous <Continuous>    MEDICATIONS  (PRN):  aluminum hydroxide/magnesium hydroxide/simethicone Suspension 30 milliLiter(s) Oral every 6 hours PRN Dyspepsia      LABS:                        9.5    15.37 )-----------( 75       ( 2021 03:45 )             29.3     06-19    148<H>  |  119<H>  |  59<H>  ----------------------------<  193<H>  4.5   |  14<L>  |  2.93<H>    Ca    7.3<L>      2021 03:45  Phos  5.5     06-19  Mg     1.9     06-19    TPro  3.8<L>  /  Alb  1.6<L>  /  TBili  0.5  /  DBili  x   /  AST  22  /  ALT  8<L>  /  AlkPhos  36<L>  06-19    PT/INR - ( 2021 00:10 )   PT: 15.7 sec;   INR: 1.33 ratio         PTT - ( 2021 00:10 )  PTT:37.1 sec  Urinalysis Basic - ( 2021 21:44 )    Color: Yellow / Appearance: Slightly Turbid / S.023 / pH: x  Gluc: x / Ketone: Negative  / Bili: Negative / Urobili: Negative   Blood: x / Protein: 100 / Nitrite: Negative   Leuk Esterase: Moderate / RBC: 7 /hpf / WBC 15 /HPF   Sq Epi: x / Non Sq Epi: 6 / Bacteria: Negative      Arterial Blood Gas:   @ 03:40  7.26/40/100/17/98/-8.5  ABG lactate: --  Arterial Blood Gas:   @ 00:06  7.34/33/191/17/100/-7.4  ABG lactate: --  Arterial Blood Gas:   @ 19:29  7.19/44/101/16/97/-11.1  ABG lactate: --    Venous Blood Gas:   @ 18:42  7.14/25/54/8/82  VBG Lactate: 1.5  Venous Blood Gas:   @ 22:38  7.32/39/34/20/63  VBG Lactate: 0.8      MICROBIOLOGY:     Culture - Sputum (collected 2021 00:26)  Source: .Sputum Sputum  Gram Stain (2021 03:30):    Rare polymorphonuclear leukocytes per low power field    Rare Squamous epithelial cells per low power field    Rare Gram Negative Diplococci per oil power field      RADIOLOGY:  [ ] Reviewed and interpreted by me    EKG:

## 2021-06-19 NOTE — DISCHARGE NOTE PROVIDER - NSDCMRMEDTOKEN_GEN_ALL_CORE_FT
amLODIPine 10 mg oral tablet: 1 tab(s) orally once a day  atorvastatin 20 mg oral tablet: 1 tab(s) orally once a day  CYANOCOBALAMIN 1,000 MCG/ML: USE AS DIRECTED ONCE A WEEK  ferrous sulfate 325 mg (65 mg elemental iron) oral tablet: 1 tab(s) orally once a day  FINASTERIDE 5 MG TABLET: TAKE 1 TABLET BY MOUTH ONCE DAILY FOR 90 DAYSFOR  BENIGN ENLARGEMENT OF PROSTATE  FOLIC ACID 1 MG TABLET: TAKE 2 TABLETS BY MOUTH ONCE DAILYFOR  ANEMIA FROM INADEQUATE FOLIC ACID  METOPROLOL SUCC ER 25 MG TAB: TAKE 1 TABLET BY MOUTH EVERY DAY FOR BLOOD PRESSURE  sucralfate 1 g oral tablet: 1 tab(s) orally 4 times a day (before meals and at bedtime)  TAMSULOSIN HCL 0.4 MG CAPSULE: TAKE 1 CAPSULE BY MOUTH AT BEDTIMEFOR  BENIGN ENLARGEMENT OF PROSTATE

## 2021-06-19 NOTE — PROGRESS NOTE ADULT - ASSESSMENT
Impression:  81M PMH thrombocytopenia, anemia, AAA, DVT s/p IVC filter, duodenal perforation 4/28/21 s/p aym patch plication and J-tube, H.pylori s/p triple therapy, DM, HTN, HLD, severe dementia (AAOx1 at baseline) recent admission for GIB at OSH  #GIB - with continued dropping hemoglobin and blood in stool s/p EGD with multiple healing ulcers no active source of bleed now with continued dropping hemoglobin, s/p repeat EGD/colonoscopy w/ blood in colon (no source identified), EGD w/ clean based ulcers. Capsule endoscopy deployed.     Recommendations:  - trend CBC  - c/w PPI drip  - f/u capsule endoscopy results to further localize bleed  - rest of care per MICU    GI will continue to follow.     Bennett Maldonado, PGY4  Gastroenterology Fellow  Available on [employer] Teams  [phone] ([facility] Short Range Pager)  [phone] (Long Range Pager)    After 5pm, please contact the on-call GI fellow. [phone]

## 2021-06-20 NOTE — PROGRESS NOTE ADULT - SUBJECTIVE AND OBJECTIVE BOX
COVERING RESIDENT: PRASHANTH KAM (PGY-2) | [facility] [phone] | [facility] [phone]    INTERVAL HPI/OVERNIGHT EVENTS: No melena ON. Oliguria. On 2 pressors.     SUBJECTIVE: Patient seen and examined at bedside. Sedated, nonresponsive.     OBJECTIVE:    VITAL SIGNS:  ICU Vital Signs Last 24 Hrs  T(C): 36.7 (2021 08:00), Max: 37.7 (2021 12:00)  T(F): 98.1 (2021 08:00), Max: 99.9 (2021 12:00)  HR: 61 (2021 10:00) (59 - 70)  BP: 118/56 (2021 10:00) (89/50 - 149/70)  BP(mean): 80 (2021 10:00) (63 - 100)  ABP: 136/52 (2021 10:00) (87/46 - 165/65)  ABP(mean): 77 (2021 10:00) (55 - 118)  RR: 21 (2021 10:00) (20 - 28)  SpO2: 99% (2021 10:00) (92% - 100%)    Mode: AC/ CMV (Assist Control/ Continuous Mandatory Ventilation), RR (machine): 20, TV (machine): 500, FiO2: 30, PEEP: 5, ITime: 1, MAP: 9, PIP: 27     @ 07: @ 07:00  --------------------------------------------------------  IN: 3962.4 mL / OUT: 145 mL / NET: 3817.4 mL     @ 07:  -   @ 10:16  --------------------------------------------------------  IN: 767.1 mL / OUT: 36 mL / NET: 731.1 mL      CAPILLARY BLOOD GLUCOSE      POCT Blood Glucose.: 154 mg/dL (2021 17:01)      PHYSICAL EXAM:    General: NAD  HEENT: NC/AT; PERRL, clear conjunctiva  Respiratory: Rhonchi b/l   Cardiovascular: +S1/S2; RRR  Abdomen: soft, NT/ND; hypoactive bowels  Extremities: WWP, 2+ peripheral pulses b/l; edema in all extremities  Skin: normal color and turgor; no rash  Neurological: Sedated, myoclonus face, arms, unresponsive    MEDICATIONS:  MEDICATIONS  (STANDING):  albumin human 25% IVPB 50 milliLiter(s) IV Intermittent every 8 hours  chlorhexidine 0.12% Liquid 15 milliLiter(s) Oral Mucosa every 12 hours  chlorhexidine 4% Liquid 1 Application(s) Topical <User Schedule>  fentaNYL   Infusion... 0.5 MICROgram(s)/kG/Hr (2.25 mL/Hr) IV Continuous <Continuous>  insulin lispro (ADMELOG) corrective regimen sliding scale   SubCutaneous every 6 hours  ketamine Infusion. 0.25 mG/kG/Hr (2.25 mL/Hr) IV Continuous <Continuous>  meropenem  IVPB 500 milliGRAM(s) IV Intermittent every 12 hours  meropenem  IVPB      norepinephrine Infusion 0.05 MICROgram(s)/kG/Min (4.22 mL/Hr) IV Continuous <Continuous>  pantoprazole  Injectable 40 milliGRAM(s) IV Push every 12 hours  phenylephrine    Infusion 2 MICROgram(s)/kG/Min (33.8 mL/Hr) IV Continuous <Continuous>  vasopressin Infusion 0.04 Unit(s)/Min (2.4 mL/Hr) IV Continuous <Continuous>    MEDICATIONS  (PRN):  aluminum hydroxide/magnesium hydroxide/simethicone Suspension 30 milliLiter(s) Oral every 6 hours PRN Dyspepsia      ALLERGIES:  Allergies    No Known Allergies    Intolerances        LABS:                        7.6    22.18 )-----------( 64       ( 2021 08:47 )             23.2     06-20    147<H>  |  120<H>  |  67<H>  ----------------------------<  146<H>  4.6   |  13<L>  |  3.98<H>    Ca    7.0<L>      2021 08:47  Phos  5.8     06-20  Mg     1.8     -20    TPro  4.3<L>  /  Alb  1.8<L>  /  TBili  0.2  /  DBili  x   /  AST  20  /  ALT  7<L>  /  AlkPhos  52  06-20    PT/INR - ( 2021 00:18 )   PT: 16.1 sec;   INR: 1.36 ratio         PTT - ( 2021 00:18 )  PTT:39.2 sec  Urinalysis Basic - ( 2021 21:44 )    Color: Yellow / Appearance: Slightly Turbid / S.023 / pH: x  Gluc: x / Ketone: Negative  / Bili: Negative / Urobili: Negative   Blood: x / Protein: 100 / Nitrite: Negative   Leuk Esterase: Moderate / RBC: 7 /hpf / WBC 15 /HPF   Sq Epi: x / Non Sq Epi: 6 / Bacteria: Negative        RADIOLOGY & ADDITIONAL TESTS: Reviewed. COVERING RESIDENT: PRASHANTH KAM (PGY-2) | [facility] [phone] | [facility] [phone]    INTERVAL HPI/OVERNIGHT EVENTS: No melena ON. Oliguria. On 2 pressors.     SUBJECTIVE: Patient seen and examined at bedside. Sedated, nonresponsive.     OBJECTIVE: Sedated, unresponsive     VITAL SIGNS:  ICU Vital Signs Last 24 Hrs  T(C): 36.7 (2021 08:00), Max: 37.7 (2021 12:00)  T(F): 98.1 (2021 08:00), Max: 99.9 (2021 12:00)  HR: 61 (2021 10:00) (59 - 70)  BP: 118/56 (2021 10:00) (89/50 - 149/70)  BP(mean): 80 (2021 10:00) (63 - 100)  ABP: 136/52 (2021 10:00) (87/46 - 165/65)  ABP(mean): 77 (2021 10:00) (55 - 118)  RR: 21 (2021 10:00) (20 - 28)  SpO2: 99% (2021 10:00) (92% - 100%)    Mode: AC/ CMV (Assist Control/ Continuous Mandatory Ventilation), RR (machine): 20, TV (machine): 500, FiO2: 30, PEEP: 5, ITime: 1, MAP: 9, PIP: 27     @ 07: @ 07:00  --------------------------------------------------------  IN: 3962.4 mL / OUT: 145 mL / NET: 3817.4 mL     @ 07: @ 10:16  --------------------------------------------------------  IN: 767.1 mL / OUT: 36 mL / NET: 731.1 mL      CAPILLARY BLOOD GLUCOSE      POCT Blood Glucose.: 154 mg/dL (2021 17:01)      PHYSICAL EXAM:    General: NAD  HEENT: NC/AT; PERRL, clear conjunctiva. Small, sluggish pupils  Respiratory: Rhonchi b/l   Cardiovascular: +S1/S2; RRR  Abdomen: soft, NT/ND; hypoactive bowels  Extremities: WWP, 2+ peripheral pulses b/l; edema in all extremities  Skin: normal color and turgor; no rash  Neurological: Sedated, myoclonus face, arms, unresponsive    MEDICATIONS:  MEDICATIONS  (STANDING):  albumin human 25% IVPB 50 milliLiter(s) IV Intermittent every 8 hours  chlorhexidine 0.12% Liquid 15 milliLiter(s) Oral Mucosa every 12 hours  chlorhexidine 4% Liquid 1 Application(s) Topical <User Schedule>  fentaNYL   Infusion... 0.5 MICROgram(s)/kG/Hr (2.25 mL/Hr) IV Continuous <Continuous>  insulin lispro (ADMELOG) corrective regimen sliding scale   SubCutaneous every 6 hours  ketamine Infusion. 0.25 mG/kG/Hr (2.25 mL/Hr) IV Continuous <Continuous>  meropenem  IVPB 500 milliGRAM(s) IV Intermittent every 12 hours  meropenem  IVPB      norepinephrine Infusion 0.05 MICROgram(s)/kG/Min (4.22 mL/Hr) IV Continuous <Continuous>  pantoprazole  Injectable 40 milliGRAM(s) IV Push every 12 hours  phenylephrine    Infusion 2 MICROgram(s)/kG/Min (33.8 mL/Hr) IV Continuous <Continuous>  vasopressin Infusion 0.04 Unit(s)/Min (2.4 mL/Hr) IV Continuous <Continuous>    MEDICATIONS  (PRN):  aluminum hydroxide/magnesium hydroxide/simethicone Suspension 30 milliLiter(s) Oral every 6 hours PRN Dyspepsia      ALLERGIES:  Allergies    No Known Allergies    Intolerances        LABS:                        7.6    22.18 )-----------( 64       ( 2021 08:47 )             23.2     06-20    147<H>  |  120<H>  |  67<H>  ----------------------------<  146<H>  4.6   |  13<L>  |  3.98<H>    Ca    7.0<L>      2021 08:47  Phos  5.8     06-20  Mg     1.8     06-20    TPro  4.3<L>  /  Alb  1.8<L>  /  TBili  0.2  /  DBili  x   /  AST  20  /  ALT  7<L>  /  AlkPhos  52  06-20    PT/INR - ( 2021 00:18 )   PT: 16.1 sec;   INR: 1.36 ratio         PTT - ( 2021 00:18 )  PTT:39.2 sec  Urinalysis Basic - ( 2021 21:44 )    Color: Yellow / Appearance: Slightly Turbid / S.023 / pH: x  Gluc: x / Ketone: Negative  / Bili: Negative / Urobili: Negative   Blood: x / Protein: 100 / Nitrite: Negative   Leuk Esterase: Moderate / RBC: 7 /hpf / WBC 15 /HPF   Sq Epi: x / Non Sq Epi: 6 / Bacteria: Negative        RADIOLOGY & ADDITIONAL TESTS: Reviewed.

## 2021-06-20 NOTE — PROGRESS NOTE ADULT - ATTENDING COMMENTS
82 y/o M w/dementia?, chronic DVT s/p IVC filter, recent duodenal perforation s/p Jim patch admitted for acute blood loss anemia and hypotension likely secondary to hemorrhagic shock from GI bleed. Received massive transfusion protocol. Unknown source of bleed. EGD negative for etiology now awaiting results of capsule enterography. Intubated for acute respiratory failure. Likely NSTEMI in setting of anemia and hemorrhagic shock. WALDO liekly secondary to ATN.    - Sedation as needed goal RASS -1 to 0  - Continue mechanical ventilation, wean from vent as tolerated once clinically improved  - Wean pressors as tolerated. Goal MAP >= 65  - Transfuse PRN for hgb < 8 due to NSTEMI  - No AC due to bleed  - Follow up results of capsule enterography. If no source identified will get CTA if patient rebleeds. Benefit would outweigh risk of worsening kidney injury.  - Protonix gtt  - Trend Cr, avoid nephrotoxins, appreciate renal recs, likely need for CRRT  - Empiric broad spectrum abx  - Guarded prognosis

## 2021-06-20 NOTE — PROGRESS NOTE ADULT - ASSESSMENT
81M PMH thrombocytopenia, anemia, AAA, DVT s/p IVC filter, duodenal perforation 4/28/21 s/p amy patch plication and J-tube, H.pylori s/p triple therapy, DM, HTN, HLD, severe dementia (AAOx1 at baseline), CKD, recent admission 6/3-6/15 for GIB now admitted again for repeat GIB. Course complicated by hemorrhagic shock.     NEURO  - intubated/sedated with Fent and Ketamine   - Baseline advanced dementia, AAOx1    PULM  - Intubated for respiratory failure in setting of hemorrhagic shock and metabolic acidosis   - on minimal vent settings    CV  #hypotension  - c/w Vaso, Levo for hemorrhagic shock    GI  #GIB  - s/p perforated duodenal ulcer s/p amy patch plication with J tube 4/28/21  - CTA A/P 6/15 negative for active bleeding. Recent EGD and tagged RBC scan in early 6/2021 w/o source of bleeding but did note large deep ulcer in the anterior duodenal bulb and large flat ulcer over the posterior duodenal bulb without visible vessel.  - GI and surgery following  - continue PPI gtt  - EGD showing numerous nonbleeding duodenal ulcers  - capsule study in progress, w/ duodenitis, fresh blood  - abd xray to track progress of capsule       RENAL  #CKD  - SCr 2.5, at baseline  - now with worsening renal function/anuric  - renal consulted for CRRT; will place HD cath today for plans to dialyze     ENDO  #DM  - ISS q6h, monitor FSG    ID  - empiric Vanc/Mariely (6/19-  )  - cultures pending     HEME  # acute blood loss anemia 2/2 UGIB  - GIB plan as above  - s/p MTP 6/19 PM  - monitor CBC q4h    #hx DVT  - s/p IVC filter  - SCDs, no antithrombotics    #thrombocytopenia  - monitor plts, transfuse for plts<50    ETHICS  - full code      81M PMH thrombocytopenia, anemia, AAA, DVT s/p IVC filter, duodenal perforation 4/28/21 s/p amy patch plication and J-tube, H.pylori s/p triple therapy, DM, HTN, HLD, severe dementia (AAOx1 at baseline), CKD, recent admission 6/3-6/15 for GIB now admitted again for repeat GIB. Course complicated by hemorrhagic shock.     NEURO  - intubated/sedated with Fent and Ketamine   - Baseline advanced dementia, AAOx1    PULM  - Intubated for respiratory failure in setting of hemorrhagic shock and metabolic acidosis   - on minimal vent settings    CV  #hypotension  - c/w Vaso, Levo for hemorrhagic shock    GI  #GIB  - s/p perforated duodenal ulcer s/p amy patch plication with J tube 4/28/21  - CTA A/P 6/15 negative for active bleeding. Recent EGD and tagged RBC scan in early 6/2021 w/o source of bleeding but did note large deep ulcer in the anterior duodenal bulb and large flat ulcer over the posterior duodenal bulb without visible vessel.  - GI and surgery following  - continue PPI gtt  - EGD showing numerous nonbleeding duodenal ulcers  - capsule study in progress, w/ duodenitis, fresh blood  - abd xray to track progress of capsule       RENAL  #CKD  - SCr 2.5, at baseline  - now with worsening renal function/anuric  - [facility] HD cath placed; CRRT today     ENDO  #DM  - ISS q6h, monitor FSG    ID  - empiric Vanc/Mariely (6/19-  )  - cultures pending     HEME  # acute blood loss anemia 2/2 UGIB  - GIB plan as above  - s/p MTP 6/19 PM  - monitor CBC q4h    #hx DVT  - s/p IVC filter  - SCDs, no antithrombotics    #thrombocytopenia  - monitor plts, transfuse for plts<50    ETHICS  - full code

## 2021-06-20 NOTE — PROGRESS NOTE ADULT - SUBJECTIVE AND OBJECTIVE BOX
[facility] Division of Kidney Diseases & Hypertension  FOLLOW UP NOTE  [phone]--------------------------------------------------------------------------------    Chief Complaint: GIB, Hemorrhagic shock    24 hour events/subjective: Pt. seen and examined in MICU today. Pt. remains critically ill, intubated on vasopressor support with oliguric renal failure. Vitals / Labs and medications reviewed.    PAST HISTORY  --------------------------------------------------------------------------------  No significant changes to PMH, PSH, FHx, SHx, unless otherwise noted    ALLERGIES & MEDICATIONS  --------------------------------------------------------------------------------  Allergies    No Known Allergies    Intolerances    Standing Inpatient Medications  chlorhexidine 0.12% Liquid 15 milliLiter(s) Oral Mucosa every 12 hours  chlorhexidine 4% Liquid 1 Application(s) Topical <User Schedule>  fentaNYL   Infusion... 0.5 MICROgram(s)/kG/Hr IV Continuous <Continuous>  insulin lispro (ADMELOG) corrective regimen sliding scale   SubCutaneous every 6 hours  ketamine Infusion. 0.25 mG/kG/Hr IV Continuous <Continuous>  meropenem  IVPB      meropenem  IVPB 500 milliGRAM(s) IV Intermittent every 12 hours  norepinephrine Infusion 0.05 MICROgram(s)/kG/Min IV Continuous <Continuous>  pantoprazole Infusion 8 mG/Hr IV Continuous <Continuous>  phenylephrine    Infusion 2 MICROgram(s)/kG/Min IV Continuous <Continuous>  vasopressin Infusion 0.04 Unit(s)/Min IV Continuous <Continuous>    VITALS/PHYSICAL EXAM  --------------------------------------------------------------------------------  T(C): 37.1 (06-20-21 @ 04:00), Max: 38 (06-19-21 @ 08:00)  HR: 60 (06-20-21 @ 07:45) (59 - 81)  BP: 115/57 (06-20-21 @ 07:30) (89/50 - 152/84)  RR: 22 (06-20-21 @ 07:45) (20 - 28)  SpO2: 97% (06-20-21 @ 07:45) (92% - 100%)  Wt(kg): --    06-19-21 @ 07:01  -  06-20-21 @ 07:00  --------------------------------------------------------  IN: 3962.4 mL / OUT: 145 mL / NET: 3817.4 mL    Physical Exam:  	Gen: Intubated, sedated  	HEENT: ET tube +  	Pulm: Fair air entry  	CV: Tachycardiac  	Abd: Soft, +BS , Distended  	Ext: B/L LE pitting edema, B/L UE swollen  	Neuro: Sedated  	Skin: Warm and dry    LABS/STUDIES  --------------------------------------------------------------------------------              8.0    21.87 >-----------<  65       [06-20-21 @ 04:20]              25.3     147  |  121  |  63  ----------------------------<  143      [06-20-21 @ 04:20]  4.8   |  12  |  3.85        Ca     7.0     [06-20-21 @ 04:20]      Mg     1.7     [06-20-21 @ 04:20]      Phos  5.7     [06-20-21 @ 04:20]    TPro  4.1  /  Alb  1.8  /  TBili  0.2  /  DBili  x   /  AST  23  /  ALT  7   /  AlkPhos  47  [06-20-21 @ 00:18]    PT/INR: PT 16.1 , INR 1.36       [06-20-21 @ 00:18]  PTT: 39.2       [06-20-21 @ 00:18]    Creatinine Trend:  SCr 3.85 [06-20 @ 04:20]  SCr 3.86 [06-20 @ 00:18]  SCr 3.58 [06-19 @ 20:44]  SCr 3.50 [06-19 @ 16:28]  SCr 3.29 [06-19 @ 11:39]    Urinalysis - [06-18-21 @ 21:44]      Color Yellow / Appearance Slightly Turbid / SG 1.023 / pH 5.5      Gluc Negative / Ketone Negative  / Bili Negative / Urobili Negative       Blood Small / Protein 100 / Leuk Est Moderate / Nitrite Negative      RBC 7 / WBC 15 / Hyaline 11 / Gran  / Sq Epi  / Non Sq Epi 6 / Bacteria Negative

## 2021-06-20 NOTE — PROGRESS NOTE ADULT - ASSESSMENT
Pt. with WALDO on CKD in the setting of hemorrhagic shock    1, WALDO on CKD/ Renal failure- Baseline Scr ~ 2.5 as per provider hand-off and chart review. Now with hemodynamically mediated ATN in the setting of hemorrhagic shock. Pt. scheduled for CT angio today. Scr increased to 3.85 today and patient remains oliguric.     2, Metabolic acidosis in the setting of renal failure.     3, Severe Hypoalbuminemia with proteinuria on UA : Unclear if patient was evaluated for nephrotic syndrome at the prior hospital    PLAN :  - Pt. with evidence of fluid overload, however O2 requirements have been stable and no critical acid base derangements currently. Spoke to Ms. LA LITTLE who is the daughter and she is agreeable to initiating RRT/HD if needed.  - Send spot urine total protein/creatinine ratio to quantify proteinuria and if patient has nephrotic range proteinuria will need formal work up. Obtain records from PCP/Nephrologists office.  - Trial of Albumin 25GM Q8 hours today for intravascular volume expansion  - Monitor BMP and UOP. Renal imaging once clinically stable.    If you have any questions, please feel free to contact me  Alfonso Meeks  Nephrology Fellow  [phone]  (After 5pm or on weekends please page the on-call fellow)

## 2021-06-20 NOTE — CHART NOTE - NSCHARTNOTEFT_GEN_A_CORE
Capsule Endoscopy report: 6/18/21    The capsule was introduced into the duodenum by upper endoscope. Duodenitis. Traces of fresh blood throughout the duodenum. The capsule did not reach the cecum.     Recommendations:  - trend CBC and monitor for signs of bleeding; if ongoing bleeding will plan for push enteroscopy  - continue pantoprazole 40mg IV BID  - abdominal Xray to ensure capsule is in colon  - rest of care per ICU team    Bennett Maldonado, PGY4  Gastroenterology Fellow  Available on Microsoft Teams  [phone] ([employer] Short Range Pager)  [phone] (Long Range Pager)    After 5pm, please contact the on-call GI fellow. [phone]

## 2021-06-21 NOTE — PROGRESS NOTE ADULT - ASSESSMENT
Pt. with WALDO on CKD in the setting of hemorrhagic shock    1, WALDO on CKD/ Renal- Baseline Scr ~ 2.5 as per provider hand-off and chart review. Now with hemodynamically mediated ATN in the setting of hemorrhagic shock. Pt. in oliguric renal failure with Scr rising to 3.85 yesterday. Pt. was subsequently initiated on CRRT yesterday, however CRRT discontinued earlier today due to clotting issues. Of note, no improvement in UOP after trial of Albumin.    2, Metabolic acidosis in the setting of renal failure.     3, Hypoalbuminemia with proteinuria on UA : Unclear if patient was evaluated for nephrotic syndrome at the prior hospital    PLAN :  - Labs reviewed, okay to monitor off CRRT for now. Trend serial labs. Patient's O2 requirements have been stable. If CRRT is to be resumed will avoid heparin and use citrate based anticoagulation.  - Send spot urine total protein/creatinine ratio to quantify proteinuria and if patient has nephrotic range proteinuria will need formal work up. Obtain records from PCP/Nephrologists office. Please obtain renal sonogram once stable.  - Monitor BMP and UOP. Renal imaging once clinically stable.  - Medications reviewed. Switch Meropenem to 500mg Q24, dose medications for eGFR < 10    If you have any questions, please feel free to contact me  Alfonso Meeks  Nephrology Fellow  [phone]  (After 5pm or on weekends please page the on-call fellow)

## 2021-06-21 NOTE — PROGRESS NOTE ADULT - ASSESSMENT
81M PMH thrombocytopenia, anemia, AAA, DVT s/p IVC filter, duodenal perforation 4/28/21 s/p amy patch plication and J-tube, H.pylori s/p triple therapy, DM, HTN, HLD, severe dementia (AAOx1 at baseline), CKD, recent admission 6/3-6/15 for GIB now admitted again for repeat GIB. Course complicated by hemorrhagic shock.     NEURO  - intubated/sedated with Fent and Ketamine   - Baseline advanced dementia, AAOx1    PULM  - Intubated for respiratory failure in setting of hemorrhagic shock and metabolic acidosis   - on minimal vent settings    CV  #hypotension  - c/w Vaso, Levo for hemorrhagic shock  - weaned off harvey     GI  #GIB  - s/p perforated duodenal ulcer s/p amy patch plication with J tube 4/28/21  - CTA A/P 6/15 negative for active bleeding. Recent EGD and tagged RBC scan in early 6/2021 w/o source of bleeding but did note large deep ulcer in the anterior duodenal bulb and large flat ulcer over the posterior duodenal bulb without visible vessel.  - GI and surgery following  - continue PPI gtt  - EGD showing numerous nonbleeding duodenal ulcers  - capsule study, w/ duodenitis, fresh blood seen in small intestine   - abd xray to track progress of capsule       RENAL  #CKD  - SCr 2.5, at baseline  - now with worsening renal function/anuric  - [facility] HD cath placed; CRRT started     ENDO  #DM  - ISS q6h, monitor FSG    ID  - empiric Vanc/Mariely (6/19-  )  - cultures NGTD    HEME  # acute blood loss anemia 2/2 UGIB  - GIB plan as above  - s/p MTP 6/19 PM  - monitor CBC q4h    #DVT  - s/p IVC filter  - SCDs, no antithrombotics    #thrombocytopenia  - monitor plts, transfuse for plts<50    ETHICS  - full code

## 2021-06-21 NOTE — PROGRESS NOTE ADULT - SUBJECTIVE AND OBJECTIVE BOX
[facility] Division of Kidney Diseases & Hypertension  FOLLOW UP NOTE  [phone]--------------------------------------------------------------------------------    Chief Complaint: GIB, Hemorrhagic shock    24 hour events/subjective: Pt. seen and examined in MICU today. Pt. remains oliguric, vasopressors currently being weaned off. Overnight CRRT with clotting issues and pt. currently off CRRT since earlier this AM    PAST HISTORY  --------------------------------------------------------------------------------  No significant changes to PMH, PSH, FHx, SHx, unless otherwise noted    ALLERGIES & MEDICATIONS  --------------------------------------------------------------------------------  Allergies    No Known Allergies    Intolerances    Standing Inpatient Medications  chlorhexidine 0.12% Liquid 15 milliLiter(s) Oral Mucosa every 12 hours  chlorhexidine 4% Liquid 1 Application(s) Topical <User Schedule>  CRRT Treatment    <Continuous>  fentaNYL   Infusion... 0.5 MICROgram(s)/kG/Hr IV Continuous <Continuous>  insulin lispro (ADMELOG) corrective regimen sliding scale   SubCutaneous every 6 hours  ketamine Infusion. 0.25 mG/kG/Hr IV Continuous <Continuous>  meropenem  IVPB 500 milliGRAM(s) IV Intermittent every 12 hours  meropenem  IVPB      norepinephrine Infusion 0.05 MICROgram(s)/kG/Min IV Continuous <Continuous>  pantoprazole  Injectable 40 milliGRAM(s) IV Push every 12 hours  phenylephrine    Infusion 2 MICROgram(s)/kG/Min IV Continuous <Continuous>  PrismaSATE Dialysate BGK 4 / 2.5 5000 milliLiter(s) CRRT <Continuous>  PrismaSOL Filtration BGK 0 / 2.5 5000 milliLiter(s) CRRT <Continuous>  PrismaSOL Filtration BGK 4 / 2.5 5000 milliLiter(s) CRRT <Continuous>  vasopressin Infusion 0.04 Unit(s)/Min IV Continuous <Continuous>    Review of Systems   Unable to obtain due to medical condition    VITALS/PHYSICAL EXAM  --------------------------------------------------------------------------------  T(C): 36.4 (06-21-21 @ 08:00), Max: 36.7 (06-21-21 @ 04:00)  HR: 53 (06-21-21 @ 08:45) (53 - 67)  BP: 109/58 (06-21-21 @ 08:45) (104/52 - 137/71)  RR: 24 (06-21-21 @ 08:45) (20 - 36)  SpO2: 96% (06-21-21 @ 08:45) (93% - 100%)  Wt(kg): --    06-20-21 @ 07:01  -  06-21-21 @ 07:00  --------------------------------------------------------  IN: 3245.5 mL / OUT: 2463 mL / NET: 782.5 mL    06-21-21 @ 07:01  -  06-21-21 @ 09:27  --------------------------------------------------------  IN: 158.6 mL / OUT: 0 mL / NET: 158.6 mL    Physical Exam:              Gen: Intubated, sedated  	HEENT: ET tube +  	Pulm: Fair air entry  	CV: Tachycardiac  	Abd: Soft, +BS , Distended  	Ext: B/L LE pitting edema, B/L UE swollen  	Neuro: Sedated  	Skin: Warm and dry              Psych : Unable to assess              Vascular : Left IJ non-tunneled HD catheter    LABS/STUDIES  --------------------------------------------------------------------------------              7.8    19.89 >-----------<  40       [06-21-21 @ 04:28]              24.1     142  |  112  |  50  ----------------------------<  99      [06-21-21 @ 04:28]  4.3   |  13  |  3.01        Ca     7.3     [06-21-21 @ 04:28]      Mg     1.9     [06-21-21 @ 04:28]      Phos  3.6     [06-21-21 @ 04:28]    TPro  4.4  /  Alb  2.0  /  TBili  0.3  /  DBili  x   /  AST  20  /  ALT  6   /  AlkPhos  62  [06-21-21 @ 00:50]    PT/INR: PT 15.5 , INR 1.31       [06-21-21 @ 00:50]  PTT: 42.2       [06-21-21 @ 00:50]    Creatinine Trend:  SCr 3.01 [06-21 @ 04:28]  SCr 3.36 [06-21 @ 00:50]  SCr 3.51 [06-20 @ 20:39]  SCr 4.07 [06-20 @ 13:46]  SCr 2.71 [06-20 @ 12:25]    Urinalysis - [06-18-21 @ 21:44]      Color Yellow / Appearance Slightly Turbid / SG 1.023 / pH 5.5      Gluc Negative / Ketone Negative  / Bili Negative / Urobili Negative       Blood Small / Protein 100 / Leuk Est Moderate / Nitrite Negative      RBC 7 / WBC 15 / Hyaline 11 / Gran  / Sq Epi  / Non Sq Epi 6 / Bacteria Negative

## 2021-06-21 NOTE — PROGRESS NOTE ADULT - SUBJECTIVE AND OBJECTIVE BOX
CHIEF COMPLAINT: GIB    Interval Events: Overnight CVVHD was stopped for issues with filter clotting. R femoral vein thrombus noted on US     REVIEW OF SYSTEMS:    [ x ] Unable to assess ROS because intubation/sedation     OBJECTIVE:  ICU Vital Signs Last 24 Hrs  T(C): 36.6 (21 Jun 2021 06:00), Max: 36.7 (20 Jun 2021 08:00)  T(F): 97.9 (21 Jun 2021 06:00), Max: 98.1 (20 Jun 2021 08:00)  HR: 54 (21 Jun 2021 06:45) (54 - 67)  BP: 115/56 (21 Jun 2021 06:15) (104/52 - 136/87)  BP(mean): 79 (21 Jun 2021 06:15) (71 - 96)  ABP: 139/56 (21 Jun 2021 06:45) (102/58 - 157/55)  ABP(mean): 82 (21 Jun 2021 06:45) (66 - 129)  RR: 25 (21 Jun 2021 06:45) (20 - 36)  SpO2: 98% (21 Jun 2021 06:45) (93% - 100%)    Mode: AC/ CMV (Assist Control/ Continuous Mandatory Ventilation), RR (machine): 25, TV (machine): 500, FiO2: 30, PEEP: 5, ITime: 1, MAP: 11, PIP: 29    06-20 @ 07:01  -  06-21 @ 07:00  --------------------------------------------------------  IN: 3245.5 mL / OUT: 2463 mL / NET: 782.5 mL      CAPILLARY BLOOD GLUCOSE      POCT Blood Glucose.: 115 mg/dL (20 Jun 2021 17:29)      PHYSICAL EXAM:  General: intubated, sedated   HEENT: NC/AT; PERRL, clear conjunctiva. Small, sluggish pupils  Respiratory: Rhonchi b/l   Cardiovascular: +S1/S2; RRR  Abdomen: soft, NT/ND; hypoactive bowels  Extremities: WWP, 2+ peripheral pulses b/l; edema in all extremities  Skin: normal color and turgor; no rash  Neurological: Sedated, pupils nonresponsive b/l       LINES: L fem evaristo, [facility] CVC, [facility] MEDICATIONS:  MEDICATIONS  (STANDING):  chlorhexidine 0.12% Liquid 15 milliLiter(s) Oral Mucosa every 12 hours  chlorhexidine 4% Liquid 1 Application(s) Topical <User Schedule>  CRRT Treatment    <Continuous>  fentaNYL   Infusion... 0.5 MICROgram(s)/kG/Hr (2.25 mL/Hr) IV Continuous <Continuous>  insulin lispro (ADMELOG) corrective regimen sliding scale   SubCutaneous every 6 hours  ketamine Infusion. 0.25 mG/kG/Hr (2.25 mL/Hr) IV Continuous <Continuous>  meropenem  IVPB 500 milliGRAM(s) IV Intermittent every 12 hours  meropenem  IVPB      norepinephrine Infusion 0.05 MICROgram(s)/kG/Min (4.22 mL/Hr) IV Continuous <Continuous>  pantoprazole  Injectable 40 milliGRAM(s) IV Push every 12 hours  phenylephrine    Infusion 2 MICROgram(s)/kG/Min (33.8 mL/Hr) IV Continuous <Continuous>  PrismaSATE Dialysate BGK 4 / 2.5 5000 milliLiter(s) (1200 mL/Hr) CRRT <Continuous>  PrismaSOL Filtration BGK 0 / 2.5 5000 milliLiter(s) (200 mL/Hr) CRRT <Continuous>  PrismaSOL Filtration BGK 4 / 2.5 5000 milliLiter(s) (800 mL/Hr) CRRT <Continuous>  vasopressin Infusion 0.04 Unit(s)/Min (2.4 mL/Hr) IV Continuous <Continuous>    MEDICATIONS  (PRN):  aluminum hydroxide/magnesium hydroxide/simethicone Suspension 30 milliLiter(s) Oral every 6 hours PRN Dyspepsia      LABS:                        7.8    19.89 )-----------( 40       ( 21 Jun 2021 04:28 )             24.1     06-21    142  |  112<H>  |  50<H>  ----------------------------<  99  4.3   |  13<L>  |  3.01<H>    Ca    7.3<L>      21 Jun 2021 04:28  Phos  3.6     06-21  Mg     1.9     06-21    TPro  4.4<L>  /  Alb  2.0<L>  /  TBili  0.3  /  DBili  x   /  AST  20  /  ALT  6<L>  /  AlkPhos  62  06-21    PT/INR - ( 21 Jun 2021 00:50 )   PT: 15.5 sec;   INR: 1.31 ratio         PTT - ( 21 Jun 2021 00:50 )  PTT:42.2 sec    Arterial Blood Gas:  06-21 @ 04:17  7.34/31/85/16/98/-8.3  ABG lactate: --  Arterial Blood Gas:  06-21 @ 00:32  7.31/32/89/15/97/-9.6  ABG lactate: --  Arterial Blood Gas:  06-20 @ 22:50  7.30/33/74/16/96/-9.5  ABG lactate: --  Arterial Blood Gas:  06-20 @ 20:41  7.21/42/69/16/93/-10.4  ABG lactate: --  Arterial Blood Gas:  06-20 @ 04:17  7.25/37/80/16/96/-10.4  ABG lactate: --  Arterial Blood Gas:  06-20 @ 00:16  7.24/38/85/16/97/-10.5  ABG lactate: --  Arterial Blood Gas:  06-19 @ 20:41  7.23/38/87/15/96/-11.1  ABG lactate: --  Arterial Blood Gas:  06-19 @ 16:27  7.23/37/94/15/97/-11.2  ABG lactate: --    Venous Blood Gas:  06-20 @ 11:40  7.21/40/45/15/82  VBG Lactate: 0.8      MICROBIOLOGY:     Culture - Urine (collected 19 Jun 2021 04:18)  Source: .Urine Catheterized  Preliminary Report (20 Jun 2021 21:56):    Culture in progress    Culture - Sputum (collected 19 Jun 2021 00:26)  Source: .Sputum Sputum  Gram Stain (19 Jun 2021 03:30):    Rare polymorphonuclear leukocytes per low power field    Rare Squamous epithelial cells per low power field    Rare Gram Negative Diplococci per oil power field  Final Report (20 Jun 2021 17:05):    Normal Respiratory Kathleen present    Culture - Blood (collected 19 Jun 2021 00:21)  Source: .Blood Blood  Preliminary Report (20 Jun 2021 01:01):    No growth to date.    Culture - Blood (collected 19 Jun 2021 00:21)  Source: .Blood Blood  Preliminary Report (20 Jun 2021 01:01):    No growth to date.      RADIOLOGY:  [ ] Reviewed and interpreted by me    EKG:

## 2021-06-21 NOTE — PROGRESS NOTE ADULT - ATTENDING COMMENTS
# WALDO/ ATN - oliguric. ATN secondary to hemorrhagic shock. Started on CRRT yesterday for volume overload. Unfortunately CRRT machine clotted twice. He has a left IJ dialysis catheter (tip at SVC). The tip of catheter may be too high, causing turbulent flow, resulting in easy clotting. Plan: we will resume CRRT using a high blow flow rate (300ml/min). If CRRT continues to clot with higher blood flow, suggest to switch dialysis catheter to the right IJ. We will consider citrate if clotting persists after changing the dialysis catheter.     # CKD - baseline serum creatinine of 2.5mg/dL. Etiology unclear. Unable to do urine studies to investigate further given that he is oliguric.     # Medication Monitoring Encounter: medication dose reviewed. Please dose medications to CrCl<10    The rest of the recommendations as per fellow's note.    Lillie Muro MD  Attending Nephrologist  [phone]

## 2021-06-21 NOTE — PROGRESS NOTE ADULT - SUBJECTIVE AND OBJECTIVE BOX
Chief Complaint:  Patient is a 81y old  Male who presents with a chief complaint of GIB (20 Jun 2021 08:15)      Interval Events:   Patient with two units of blood yesterday.  Hemoglobin in the 7's this morning.    Allergies:  No Known Allergies      Hospital Medications:  aluminum hydroxide/magnesium hydroxide/simethicone Suspension 30 milliLiter(s) Oral every 6 hours PRN  chlorhexidine 0.12% Liquid 15 milliLiter(s) Oral Mucosa every 12 hours  chlorhexidine 4% Liquid 1 Application(s) Topical <User Schedule>  CRRT Treatment    <Continuous>  fentaNYL   Infusion... 0.5 MICROgram(s)/kG/Hr IV Continuous <Continuous>  insulin lispro (ADMELOG) corrective regimen sliding scale   SubCutaneous every 6 hours  ketamine Infusion. 0.25 mG/kG/Hr IV Continuous <Continuous>  meropenem  IVPB 500 milliGRAM(s) IV Intermittent every 12 hours  meropenem  IVPB      norepinephrine Infusion 0.05 MICROgram(s)/kG/Min IV Continuous <Continuous>  pantoprazole  Injectable 40 milliGRAM(s) IV Push every 12 hours  phenylephrine    Infusion 2 MICROgram(s)/kG/Min IV Continuous <Continuous>  PrismaSATE Dialysate BGK 4 / 2.5 5000 milliLiter(s) CRRT <Continuous>  PrismaSOL Filtration BGK 0 / 2.5 5000 milliLiter(s) CRRT <Continuous>  PrismaSOL Filtration BGK 4 / 2.5 5000 milliLiter(s) CRRT <Continuous>  vasopressin Infusion 0.04 Unit(s)/Min IV Continuous <Continuous>      PMHX/PSHX:  Thrombocytopenia    Anemia    Abdominal aortic aneurysm    Deep vein thrombosis (DVT)    Diabetes mellitus    Hypertension    Hyperlipidemia    Duodenal ulcer    S/P IVC filter    H/O: duodenal ulcer    H/O exploratory laparotomy        Family history:      ROS:  General: no night sweats, wt loss  CV: no pain, palpitation  Resp: no cough wheezing  Muscles: no weakness or pain  Endocrine: no polyuria, polydipsia, cold/heat intolerance  Heme: No petechia, ecchymosis    PHYSICAL EXAM:   GENERAL:  NAD  HEENT:  NC/AT,  conjunctivae clear, sclera -anicteric  CHEST:  Full & symmetric excursion, no increased  HEART:  Regular rhythm  ABDOMEN:  Soft, non-tender, non-distended, normoactive bowel sounds,  no masses ,no hepato-splenomegaly,   EXTREMITIES:  no cyanosis,clubbing or edema  SKIN:  No rash/erythema/ecchymoses/petechiae/wounds/abscess/warm/dry  NEURO:  Alert, oriented    Vital Signs:  Vital Signs Last 24 Hrs  T(C): 36.6 (21 Jun 2021 06:00), Max: 36.7 (20 Jun 2021 08:00)  T(F): 97.9 (21 Jun 2021 06:00), Max: 98.1 (20 Jun 2021 08:00)  HR: 54 (21 Jun 2021 06:45) (54 - 67)  BP: 115/56 (21 Jun 2021 06:15) (104/52 - 136/87)  BP(mean): 79 (21 Jun 2021 06:15) (71 - 96)  RR: 25 (21 Jun 2021 06:45) (20 - 36)  SpO2: 98% (21 Jun 2021 06:45) (93% - 100%)  Daily     Daily     LABS:                        7.8    19.89 )-----------( 40       ( 21 Jun 2021 04:28 )             24.1     06-21    142  |  112<H>  |  50<H>  ----------------------------<  99  4.3   |  13<L>  |  3.01<H>    Ca    7.3<L>      21 Jun 2021 04:28  Phos  3.6     06-21  Mg     1.9     06-21    TPro  4.4<L>  /  Alb  2.0<L>  /  TBili  0.3  /  DBili  x   /  AST  20  /  ALT  6<L>  /  AlkPhos  62  06-21    LIVER FUNCTIONS - ( 21 Jun 2021 00:50 )  Alb: 2.0 g/dL / Pro: 4.4 g/dL / ALK PHOS: 62 U/L / ALT: 6 U/L / AST: 20 U/L / GGT: x           PT/INR - ( 21 Jun 2021 00:50 )   PT: 15.5 sec;   INR: 1.31 ratio         PTT - ( 21 Jun 2021 00:50 )  PTT:42.2 sec        Imaging:

## 2021-06-21 NOTE — CHART NOTE - NSCHARTNOTEFT_GEN_A_CORE
IR follow up note     CTA obtained this evening, reviewed with Dr. Weathers. Hypertense material in the stomach as well as the large and small bowel. No clear source of bleeding identified.   Chart reviewed. Patient has stable H/H with decreasing pressor requirements throughout the day. Last pRBC transfusion at 7PM on 6/20.     - given negative CTA and improving hemodynamics, would defer intervention at this time.   - IR remains available overnight should the patient's clinical status change  - recommendations discussed with MICU team    Please page IR with any questions or concerns.  After hours and weekends please page [phone] followed by [phone]. Long range call back number appreciated. IR follow up note     CTA obtained this evening, reviewed with Dr. Weathers. Hypertense material in the stomach as well as the large and small bowel. No clear source of bleeding identified.   Chart reviewed. Patient has stable H/H with decreasing pressor requirements throughout the day. Last pRBC transfusion at 7PM on 6/20.     - given negative CTA and improving hemodynamics, would defer intervention at this time.   - IR remains available overnight should the patient's clinical status change  - recommendations discussed with MICU team    Please page IR with any questions or concerns.  After hours and weekends please page [phone] followed by [phone]. Long range call back number appreciated.    IR attending    As above, CTA fails to demonstrate a bleeding source.  Continue conservative management which may include platelet transfusion at some point to augment hemostasis.

## 2021-06-21 NOTE — CONSULT NOTE ADULT - SUBJECTIVE AND OBJECTIVE BOX
Interventional Radiology    Evaluate for Procedure:     HPI:   81M PMH thrombocytopenia, anemia, AAA, DVT s/p IVC filter, duodenal perforation 4/28/21 s/p amy patch plication and J-tube, H.pylori s/p triple therapy, DM, HTN, HLD, severe dementia (AAOx1 at baseline) recent admission for GIB at OSH  #GIB - with continued dropping hemoglobin and blood in stool s/p EGD with multiple healing ulcers no active source of bleed now with continued dropping hemoglobin, s/p repeat EGD/colonoscopy w/ blood in colon (no source identified), EGD w/ clean based ulcers. Capsule endoscopy deployed with bleeding seen in small intestine.    Allergies:   Medications (Abx/Cardiac/Anticoagulation/Blood Products)  cefTRIAXone   IVPB: 100 mL/Hr IV Intermittent (06-21 @ 10:54)  meropenem  IVPB: 100 mL/Hr IV Intermittent (06-21 @ 08:39)  norepinephrine Infusion: 4.22 mL/Hr IV Continuous (06-20 @ 15:19)  norepinephrine Infusion: 4.22 mL/Hr IV Continuous (06-20 @ 21:29)    Data:    T(C): 35.8  HR: 51  BP: 114/61  RR: 25  SpO2: 95%    -WBC 16.46 / HgB 7.0 / Hct 21.5 / Plt 35  -Na 143 / Cl 115 / BUN 49 / Glucose 95  -K 4.5 / CO2 14 / Cr 3.17  -ALT -- / Alk Phos -- / T.Bili --  -INR 1.31 / PTT 42.2      Radiology:     Assessment/Plan:     -- Recommend obtaining CTA with GI bleed protocol to evaluate for active bleed and anatomy  - Transfuse PRN  - GI f/u  - IR to follow Interventional Radiology    Evaluate for Procedure:     HPI:   81M PMH thrombocytopenia, anemia, AAA, DVT s/p IVC filter, duodenal perforation 4/28/21 s/p amy patch plication and J-tube, H.pylori s/p triple therapy, DM, HTN, HLD, severe dementia (AAOx1 at baseline) recent admission for GIB at OSH  #GIB - with continued dropping hemoglobin and blood in stool s/p EGD with multiple healing ulcers no active source of bleed now with continued dropping hemoglobin, s/p repeat EGD/colonoscopy w/ blood in colon (no source identified), EGD w/ clean based ulcers. Capsule endoscopy deployed with bleeding seen in small intestine.    Allergies:   Medications (Abx/Cardiac/Anticoagulation/Blood Products)  cefTRIAXone   IVPB: 100 mL/Hr IV Intermittent (06-21 @ 10:54)  meropenem  IVPB: 100 mL/Hr IV Intermittent (06-21 @ 08:39)  norepinephrine Infusion: 4.22 mL/Hr IV Continuous (06-20 @ 15:19)  norepinephrine Infusion: 4.22 mL/Hr IV Continuous (06-20 @ 21:29)    Data:    T(C): 35.8  HR: 51  BP: 114/61  RR: 25  SpO2: 95%    -WBC 16.46 / HgB 7.0 / Hct 21.5 / Plt 35  -Na 143 / Cl 115 / BUN 49 / Glucose 95  -K 4.5 / CO2 14 / Cr 3.17  -ALT -- / Alk Phos -- / T.Bili --  -INR 1.31 / PTT 42.2      Radiology:     Assessment/Plan:     -- Recommend obtaining CTA with GI bleed protocol to evaluate for active bleed and anatomy.  - Transfuse PRN.  - GI f/u  - IR to follow

## 2021-06-21 NOTE — PROGRESS NOTE ADULT - ATTENDING COMMENTS
Agree with above  GIB is apparently d/t duodenitis, possible ZES  Remains intubated  On pressors, although requirements are decreasing  GI: no further interventions. Recommend CTA to evaluate any further source of bleeding (although less bleeding now with none overnight)  Empiric antibiotics with lennox  CVVHD vs IHD  Patient will remain in ICU while intubated on full vent support    I have personally provided 45 minutes of critical care time.

## 2021-06-21 NOTE — PROGRESS NOTE ADULT - ASSESSMENT
Impression:  81M PMH thrombocytopenia, anemia, AAA, DVT s/p IVC filter, duodenal perforation 4/28/21 s/p amy patch plication and J-tube, H.pylori s/p triple therapy, DM, HTN, HLD, severe dementia (AAOx1 at baseline) recent admission for GIB at OSH  #GIB - with continued dropping hemoglobin and blood in stool s/p EGD with multiple healing ulcers no active source of bleed now with continued dropping hemoglobin, s/p repeat EGD/colonoscopy w/ blood in colon (no source identified), EGD w/ clean based ulcers. Capsule endoscopy deployed with bleeding seen in small intestine.    Recommendations:  - trend CBC  - c/w PPI drip  - [facility]  Gastroenterology Fellow  Pager x [phone] or [phone]  Please page on-call Fellow on weekends/holidays or after 5 pm and before 8 am on weekdays   On-call GI fellow can be contacted via the [employer] service ([phone])

## 2021-06-22 NOTE — CHART NOTE - NSCHARTNOTEFT_GEN_A_CORE
Nutrition Follow Up Note   Patient seen for: malnutrition follow up on  ICU     Source: medical record, communication with team.     Chart reviewed, events noted. Adm for GIB. Intubated 6/19, now on CRRT for with WALDO on CKD in the setting of hemorrhagic shock.    Diet Order: Diet, NPO with Tube Feed:   Tube Feeding Modality: Orogastric  Nepro with Carb Steady (NEPRORTH)  Total Volume for 24 Hours (mL): 960  Continuous  Starting Tube Feed Rate {mL per Hour}: 10  Increase Tube Feed Rate by (mL): 10     Every 4 hours  Until Goal Tube Feed Rate (mL per Hour): 40  Tube Feed Duration (in Hours): 24  Tube Feed Start Time: 11:00 (06-21-21 @ 11:15)    CURRENT EN ORDER PROVIDES: 1728 kcals, 78 gm protein    Nutrition Events:   - OGT for tube feeds, 24 hr intake 6/22 450 cc  - J-tube for decompression, scant drainage per RN    GI: fecal incontinence 6/18     Anthropometric Measurements:   Height (cm): 180.3 (06-16-21 @ 20:34)  Weight (kg): 90.1 (06-16-21 @ 20:34)  BMI (kg/m2): 27.7 (06-16-21 @ 20:34)  6/22 wt 102 kg, significant wt increase w/ edema     Medications: MEDICATIONS  (STANDING):  chlorhexidine 0.12% Liquid 15 milliLiter(s) Oral Mucosa every 12 hours  chlorhexidine 4% Liquid 1 Application(s) Topical <User Schedule>  CRRT Treatment    <Continuous>  insulin lispro (ADMELOG) corrective regimen sliding scale   SubCutaneous every 6 hours  ketamine Infusion. 0.25 mG/kG/Hr (2.25 mL/Hr) IV Continuous <Continuous>  norepinephrine Infusion 0.4 MICROgram(s)/kG/Min (33.8 mL/Hr) IV Continuous <Continuous>  pantoprazole  Injectable 40 milliGRAM(s) IV Push every 12 hours  PrismaSATE Dialysate BGK 4 / 2.5 5000 milliLiter(s) (1200 mL/Hr) CRRT <Continuous>  PrismaSOL Filtration BGK 0 / 2.5 5000 milliLiter(s) (200 mL/Hr) CRRT <Continuous>  PrismaSOL Filtration BGK 4 / 2.5 5000 milliLiter(s) (1000 mL/Hr) CRRT <Continuous>  vasopressin Infusion 0.04 Unit(s)/Min (2.4 mL/Hr) IV Continuous <Continuous>    MEDICATIONS  (PRN):    Labs: 06-22 @ 11:14: Sodium 141, Potassium 4.0, Calcium 7.5<L>, Magnesium 2.0, Phosphorus 2.6, BUN 37<H>, Creatinine 2.27<H>, Glucose 130<H>, Alk Phos --, ALT/SGPT --, AST/SGOT --, Albumin --, Prealbumin --, Total Bilirubin --, Hemoglobin 7.7<L>, Hematocrit 22.7<L>, Ferritin --, C-Reactive Protein --, Creatine Kinase <<27>  06-22 @ 05:52: Sodium 142, Potassium 4.4, Calcium 7.3<L>, Magnesium 2.0, Phosphorus --, BUN 41<H>, Creatinine 2.65<H>, Glucose 111<H>, Alk Phos 89, ALT/SGPT 8<L>, AST/SGOT 20, Albumin 1.9<L>, Prealbumin --, Total Bilirubin 0.3, Hemoglobin 6.9<LL>, Hematocrit 20.8<LL>, Ferritin --, C-Reactive Protein --, Creatine Kinase <<27>  06-21 @ 23:37: Sodium 142, Potassium 4.5, Calcium 7.2<L>, Magnesium 2.0, Phosphorus 3.4, BUN 46<H>, Creatinine 2.92<H>, Glucose 93, Alk Phos 75, ALT/SGPT 8<L>, AST/SGOT 17, Albumin 2.0<L>, Prealbumin --, Total Bilirubin 0.2, Hemoglobin 6.9<LL>, Hematocrit 20.8<LL>, Ferritin --, C-Reactive Protein --, Creatine Kinase <<27>  06-21 @ 15:37: Sodium 144, Potassium 4.6, Calcium 7.3<L>, Magnesium 1.9, Phosphorus 4.2, BUN 53<H>, Creatinine 3.33<H>, Glucose 102<H>, Alk Phos 68, ALT/SGPT 8<L>, AST/SGOT 18, Albumin 2.0<L>, Prealbumin --, Total Bilirubin 0.2, Hemoglobin 7.1<L>, Hematocrit 21.6<L>, Ferritin --, C-Reactive Protein --, Creatine Kinase <<27>        POCT Blood Glucose.: 123 mg/dL (06-22-21 @ 11:08)  POCT Blood Glucose.: 109 mg/dL (06-22-21 @ 05:44)  POCT Blood Glucose.: 91 mg/dL (06-21-21 @ 23:28)  POCT Blood Glucose.: 98 mg/dL (06-21-21 @ 18:37)    Skin: no pressure injuries documented   Edema: 4+ dependent, generalized, pitting both legs, arms, scrotum    Estimated Needs: reassessed w/ consideration for intubation and CRRT using dosing wt 90.1 kg  Energy: 7139-7052 kcals (23-25 kcals/kg)  Protein:  108-126 gm protein (1.2-1.4 gm/kg)      Previous Nutrition Diagnosis: severe malnutrition   Nutrition Diagnosis is: care plan ongoing, addressed w/ EN, to be adjusted to meet needs    New Nutrition Diagnosis:      Recommended Interventions:   1. Nepro goal 55 cc/hr x 24 hrs to provide 2376 kcals, 107 gm protein, 1000 cc free water meets 26 kcals/kg, 1.2 gm protein/kg dosing wt 90.1 kg      Monitoring and Evaluation:   Continue to monitor nutrition provision and tolerance, weights, labs, skin integrity.   RD remains available upon request and will follow up per protocol.

## 2021-06-22 NOTE — PROGRESS NOTE ADULT - ASSESSMENT
Pt. with WALDO on CKD in the setting of hemorrhagic shock    1, WALDO on CKD/ Renal- Baseline Scr ~ 2.5 as per provider hand-off and chart review. Now with hemodynamically mediated ATN in the setting of hemorrhagic shock. Pt. in oliguric renal failure with Scr rising to 3.85 on 6/19 and pt. was subsequently initiated on CRRT via LIJ HD catheter. Overnight one episode of clotting as per MICU team.    2, Metabolic acidosis in the setting of renal failure.     3, Hypoalbuminemia with proteinuria on UA : Unclear if patient was evaluated for nephrotic syndrome at the prior hospital    PLAN :  - Continue CRRT with high BFR 300ml/min, if clotting recurs, recommend changing the HD catheter. Might have to use citrate based anticoagulation if needed.  - Send spot urine total protein/creatinine ratio to quantify proteinuria and if patient has nephrotic range proteinuria will need formal work up. Obtain records from PCP/Nephrologists office.  - Monitor BMP and UOP. Renal imaging once clinically stable.  - Medications reviewed. Dose medications for eGFR < 10    If you have any questions, please feel free to contact me  Alfonso Meeks  Nephrology Fellow  [phone]  (After 5pm or on weekends please page the on-call fellow)

## 2021-06-22 NOTE — PROGRESS NOTE ADULT - SUBJECTIVE AND OBJECTIVE BOX
Chief Complaint:  Patient is a 81y old  Male who presents with a chief complaint of GIB (2021 12:53)      Interval Events:   IR consulted and is seeing patient to provide recommendations per our request.  However patient did have CTA which was negative (likely due to slowed bleeding as pressor requirements have decreased and he is requiring less blood)    Allergies:  No Known Allergies      Hospital Medications:  cefTRIAXone   IVPB 1000 milliGRAM(s) IV Intermittent every 24 hours  chlorhexidine 0.12% Liquid 15 milliLiter(s) Oral Mucosa every 12 hours  chlorhexidine 4% Liquid 1 Application(s) Topical <User Schedule>  CRRT Treatment    <Continuous>  insulin lispro (ADMELOG) corrective regimen sliding scale   SubCutaneous every 6 hours  ketamine Infusion. 0.25 mG/kG/Hr IV Continuous <Continuous>  norepinephrine Infusion 0.05 MICROgram(s)/kG/Min IV Continuous <Continuous>  pantoprazole  Injectable 40 milliGRAM(s) IV Push every 12 hours  PrismaSATE Dialysate BGK 4 / 2.5 5000 milliLiter(s) CRRT <Continuous>  PrismaSOL Filtration BGK 0 / 2.5 5000 milliLiter(s) CRRT <Continuous>  PrismaSOL Filtration BGK 4 / 2.5 5000 milliLiter(s) CRRT <Continuous>  vasopressin Infusion 0.04 Unit(s)/Min IV Continuous <Continuous>      PMHX/PSHX:  Thrombocytopenia    Anemia    Abdominal aortic aneurysm    Deep vein thrombosis (DVT)    Diabetes mellitus    Hypertension    Hyperlipidemia    Duodenal ulcer    S/P IVC filter    H/O: duodenal ulcer    H/O exploratory laparotomy        Family history:      ROS:  General: no night sweats, wt loss  CV: no pain, palpitation  Resp: no cough wheezing  Muscles: no weakness or pain  Endocrine: no polyuria, polydipsia, cold/heat intolerance  Heme: No petechia, ecchymosis    PHYSICAL EXAM:   GENERAL:  NAD  HEENT:  NC/AT,  conjunctivae clear, sclera -anicteric  CHEST:  Full & symmetric excursion, no increased  HEART:  Regular rhythm  ABDOMEN:  Soft, non-tender, non-distended, normoactive bowel sounds,  no masses ,no hepato-splenomegaly,   EXTREMITIES:  no cyanosis,clubbing or edema  SKIN:  No rash/erythema/ecchymoses/petechiae/wounds/abscess/warm/dry  NEURO:  Alert, oriented    Vital Signs:  Vital Signs Last 24 Hrs  T(C): 37.4 (2021 07:05), Max: 37.9 (2021 00:00)  T(F): 99.3 (2021 07:05), Max: 100.2 (2021 00:00)  HR: 72 (2021 07:05) (50 - 74)  BP: 106/65 (2021 07:00) (85/54 - 133/74)  BP(mean): 79 (2021 07:) (64 - 98)  RR: 28 (2021 07:05) (24 - 56)  SpO2: 99% (:05) (88% - 100%)  Daily     Daily Weight in k (2021 03:30)    LABS:                        6.9    16.73 )-----------( 37       ( :52 )             20.8     06-22    142  |  110<H>  |  41<H>  ----------------------------<  111<H>  4.4   |  13<L>  |  2.65<H>    Ca    7.3<L>      :52  Phos  3.4     06-21  Mg     2.0     06-22    TPro  4.4<L>  /  Alb  1.9<L>  /  TBili  0.3  /  DBili  x   /  AST  20  /  ALT  8<L>  /  AlkPhos  89  06-22    LIVER FUNCTIONS - ( :52 )  Alb: 1.9 g/dL / Pro: 4.4 g/dL / ALK PHOS: 89 U/L / ALT: 8 U/L / AST: 20 U/L / GGT: x           PT/INR - ( :52 )   PT: 16.7 sec;   INR: 1.41 ratio         PTT - ( :52 )  PTT:40.0 sec        Imaging:

## 2021-06-22 NOTE — PROGRESS NOTE ADULT - SUBJECTIVE AND OBJECTIVE BOX
[facility] Division of Kidney Diseases & Hypertension  FOLLOW UP NOTE  [phone]--------------------------------------------------------------------------------    Chief Complaint: GIB, Hemorrhagic shock    24 hour events/subjective: Pt. seen and examined in MICU today. Pt. underwent CTA yesterday with no evidence of active bleeding. CRRT resumed post CTA yesterday and as per RN one episode of clotting overnight. Pt. currently tolerating CRRT via left IJ non-tunneled HD catheter. Pt. remains oliguric, vasopressors currently being weaned off.     PAST HISTORY  --------------------------------------------------------------------------------  No significant changes to PMH, PSH, FHx, SHx, unless otherwise noted    ALLERGIES & MEDICATIONS  --------------------------------------------------------------------------------  Allergies    No Known Allergies    Intolerances    Standing Inpatient Medications  chlorhexidine 0.12% Liquid 15 milliLiter(s) Oral Mucosa every 12 hours  chlorhexidine 4% Liquid 1 Application(s) Topical <User Schedule>  CRRT Treatment    <Continuous>  insulin lispro (ADMELOG) corrective regimen sliding scale   SubCutaneous every 6 hours  ketamine Infusion. 0.25 mG/kG/Hr IV Continuous <Continuous>  norepinephrine Infusion 0.4 MICROgram(s)/kG/Min IV Continuous <Continuous>  pantoprazole  Injectable 40 milliGRAM(s) IV Push every 12 hours  PrismaSATE Dialysate BGK 4 / 2.5 5000 milliLiter(s) CRRT <Continuous>  PrismaSOL Filtration BGK 0 / 2.5 5000 milliLiter(s) CRRT <Continuous>  PrismaSOL Filtration BGK 4 / 2.5 5000 milliLiter(s) CRRT <Continuous>  vasopressin Infusion 0.04 Unit(s)/Min IV Continuous <Continuous>    REVIEW OF SYSTEMS  --------------------------------------------------------------------------------  Unable to obtain due to medical condition    VITALS/PHYSICAL EXAM  --------------------------------------------------------------------------------  T(C): 37.3 (06-22-21 @ 08:00), Max: 37.9 (06-22-21 @ 00:00)  HR: 70 (06-22-21 @ 10:45) (50 - 74)  BP: 111/69 (06-22-21 @ 10:45) (85/54 - 138/74)  RR: 29 (06-22-21 @ 10:00) (25 - 56)  SpO2: 98% (06-22-21 @ 10:45) (88% - 100%)  Wt(kg): --    06-21-21 @ 07:01  -  06-22-21 @ 07:00  --------------------------------------------------------  IN: 2642 mL / OUT: 2205 mL / NET: 437 mL    06-22-21 @ 07:01  -  06-22-21 @ 11:39  --------------------------------------------------------  IN: 869 mL / OUT: 1186 mL / NET: -317 mL    Physical Exam:              Gen: Intubated, sedated  	HEENT: ET tube +  	Pulm: Fair air entry  	CV: Tachycardiac  	Abd: Soft, +BS , Distended  	Ext: B/L LE pitting edema, B/L UE swollen  	Neuro: Sedated  	Skin: Warm and dry              Psych : Unable to assess              Vascular : Left IJ non-tunneled HD catheter    LABS/STUDIES  --------------------------------------------------------------------------------              7.7    15.00 >-----------<  33       [06-22-21 @ 11:14]              22.7     142  |  110  |  41  ----------------------------<  111      [06-22-21 @ 05:52]  4.4   |  13  |  2.65        Ca     7.3     [06-22-21 @ 05:52]      Mg     2.0     [06-22-21 @ 05:52]      Phos  3.4     [06-21-21 @ 23:37]    TPro  4.4  /  Alb  1.9  /  TBili  0.3  /  DBili  x   /  AST  20  /  ALT  8   /  AlkPhos  89  [06-22-21 @ 05:52]    PT/INR: PT 16.7 , INR 1.41       [06-22-21 @ 05:52]  PTT: 40.0       [06-22-21 @ 05:52]    Creatinine Trend:  SCr 2.65 [06-22 @ 05:52]  SCr 2.92 [06-21 @ 23:37]  SCr 3.33 [06-21 @ 15:37]  SCr 3.17 [06-21 @ 11:23]  SCr 3.01 [06-21 @ 04:28]    Urinalysis - [06-18-21 @ 21:44]      Color Yellow / Appearance Slightly Turbid / SG 1.023 / pH 5.5      Gluc Negative / Ketone Negative  / Bili Negative / Urobili Negative       Blood Small / Protein 100 / Leuk Est Moderate / Nitrite Negative      RBC 7 / WBC 15 / Hyaline 11 / Gran  / Sq Epi  / Non Sq Epi 6 / Bacteria Negative

## 2021-06-22 NOTE — PROGRESS NOTE ADULT - SUBJECTIVE AND OBJECTIVE BOX
CHIEF COMPLAINT: GIB    Interval Events: Hg 6.9 overnight -> 1u pRBC -> repeat 6.9 -> 1u pRBC      REVIEW OF SYSTEMS:    [ x ] Unable to assess ROS because intubation/sedation     OBJECTIVE:  ICU Vital Signs Last 24 Hrs  T(C): 37.3 (22 Jun 2021 08:00), Max: 37.9 (22 Jun 2021 00:00)  T(F): 99.1 (22 Jun 2021 08:00), Max: 100.2 (22 Jun 2021 00:00)  HR: 73 (22 Jun 2021 08:00) (50 - 74)  BP: 110/70 (22 Jun 2021 08:00) (85/54 - 133/74)  BP(mean): 84 (22 Jun 2021 08:00) (64 - 98)  ABP: 131/61 (22 Jun 2021 08:00) (85/48 - 338/319)  ABP(mean): 81 (22 Jun 2021 08:00) (60 - 329)  RR: 29 (22 Jun 2021 08:00) (24 - 56)  SpO2: 99% (22 Jun 2021 08:00) (88% - 100%)    Mode: AC/ CMV (Assist Control/ Continuous Mandatory Ventilation), RR (machine): 25, TV (machine): 500, FiO2: 30, PEEP: 5, ITime: 0.85, MAP: 11, PIP: 24    06-21 @ 07:01  -  06-22 @ 07:00  --------------------------------------------------------  IN: 2642 mL / OUT: 2205 mL / NET: 437 mL    06-22 @ 07:01 - 06-22 @ 08:20  --------------------------------------------------------  IN: 423.8 mL / OUT: 623 mL / NET: -199.2 mL      CAPILLARY BLOOD GLUCOSE  109 (22 Jun 2021 05:45)      POCT Blood Glucose.: 109 mg/dL (22 Jun 2021 05:44)      PHYSICAL EXAM:  General: intubated, sedated   HEENT: NC/AT; PERRL, clear conjunctiva. Small, sluggish pupils  Respiratory: Rhonchi b/l   Cardiovascular: +S1/S2; RRR  Abdomen: soft, NT/ND; hypoactive bowels  Extremities: WWP, 2+ peripheral pulses b/l; edema in all extremities  Skin: normal color and turgor; no rash  Neurological: Sedated, pupils nonresponsive b/l       LINES: L fem evaristo, [facility] CVC, [facility] MEDICATIONS:  MEDICATIONS  (STANDING):  cefTRIAXone   IVPB 1000 milliGRAM(s) IV Intermittent every 24 hours  chlorhexidine 0.12% Liquid 15 milliLiter(s) Oral Mucosa every 12 hours  chlorhexidine 4% Liquid 1 Application(s) Topical <User Schedule>  CRRT Treatment    <Continuous>  insulin lispro (ADMELOG) corrective regimen sliding scale   SubCutaneous every 6 hours  ketamine Infusion. 0.25 mG/kG/Hr (2.25 mL/Hr) IV Continuous <Continuous>  norepinephrine Infusion 0.05 MICROgram(s)/kG/Min (4.22 mL/Hr) IV Continuous <Continuous>  pantoprazole  Injectable 40 milliGRAM(s) IV Push every 12 hours  PrismaSATE Dialysate BGK 4 / 2.5 5000 milliLiter(s) (1200 mL/Hr) CRRT <Continuous>  PrismaSOL Filtration BGK 0 / 2.5 5000 milliLiter(s) (200 mL/Hr) CRRT <Continuous>  PrismaSOL Filtration BGK 4 / 2.5 5000 milliLiter(s) (1000 mL/Hr) CRRT <Continuous>  vasopressin Infusion 0.04 Unit(s)/Min (2.4 mL/Hr) IV Continuous <Continuous>    MEDICATIONS  (PRN):      LABS:                        6.9    16.73 )-----------( 37       ( 22 Jun 2021 05:52 )             20.8     06-22    142  |  110<H>  |  41<H>  ----------------------------<  111<H>  4.4   |  13<L>  |  2.65<H>    Ca    7.3<L>      22 Jun 2021 05:52  Phos  3.4     06-21  Mg     2.0     06-22    TPro  4.4<L>  /  Alb  1.9<L>  /  TBili  0.3  /  DBili  x   /  AST  20  /  ALT  8<L>  /  AlkPhos  89  06-22    PT/INR - ( 22 Jun 2021 05:52 )   PT: 16.7 sec;   INR: 1.41 ratio         PTT - ( 22 Jun 2021 05:52 )  PTT:40.0 sec    Arterial Blood Gas:  06-22 @ 05:56  7.38/27/80/16/97/-8.1  ABG lactate: --  Arterial Blood Gas:  06-21 @ 04:17  7.34/31/85/16/98/-8.3  ABG lactate: --  Arterial Blood Gas:  06-21 @ 00:32  7.31/32/89/15/97/-9.6  ABG lactate: --  Arterial Blood Gas:  06-20 @ 22:50  7.30/33/74/16/96/-9.5  ABG lactate: --  Arterial Blood Gas:  06-20 @ 20:41  7.21/42/69/16/93/-10.4  ABG lactate: --    Venous Blood Gas:  06-20 @ 11:40  7.21/40/45/15/82  VBG Lactate: 0.8      MICROBIOLOGY:     RADIOLOGY:  [ ] Reviewed and interpreted by me    EKG: CHIEF COMPLAINT: GIB    Interval Events: Hg 6.9 overnight -> 1u pRBC -> repeat 6.9 -> 1u pRBC  No melena overnight. Dropping Hg possibly due to clotted CVVHD filter    REVIEW OF SYSTEMS:    [ x ] Unable to assess ROS because intubation/sedation     OBJECTIVE:  ICU Vital Signs Last 24 Hrs  T(C): 37.3 (22 Jun 2021 08:00), Max: 37.9 (22 Jun 2021 00:00)  T(F): 99.1 (22 Jun 2021 08:00), Max: 100.2 (22 Jun 2021 00:00)  HR: 73 (22 Jun 2021 08:00) (50 - 74)  BP: 110/70 (22 Jun 2021 08:00) (85/54 - 133/74)  BP(mean): 84 (22 Jun 2021 08:00) (64 - 98)  ABP: 131/61 (22 Jun 2021 08:00) (85/48 - 338/319)  ABP(mean): 81 (22 Jun 2021 08:00) (60 - 329)  RR: 29 (22 Jun 2021 08:00) (24 - 56)  SpO2: 99% (22 Jun 2021 08:00) (88% - 100%)    Mode: AC/ CMV (Assist Control/ Continuous Mandatory Ventilation), RR (machine): 25, TV (machine): 500, FiO2: 30, PEEP: 5, ITime: 0.85, MAP: 11, PIP: 24    06-21 @ 07:01  -  06-22 @ 07:00  --------------------------------------------------------  IN: 2642 mL / OUT: 2205 mL / NET: 437 mL    06-22 @ 07:01  -  06-22 @ 08:20  --------------------------------------------------------  IN: 423.8 mL / OUT: 623 mL / NET: -199.2 mL      CAPILLARY BLOOD GLUCOSE  109 (22 Jun 2021 05:45)      POCT Blood Glucose.: 109 mg/dL (22 Jun 2021 05:44)      PHYSICAL EXAM:  General: intubated, sedated   HEENT: NC/AT; PERRL, clear conjunctiva. Small, sluggish pupils  Respiratory: Rhonchi b/l   Cardiovascular: +S1/S2; RRR  Abdomen: soft, NT/ND; hypoactive bowels  Extremities: WWP, 2+ peripheral pulses b/l; edema in all extremities  Skin: normal color and turgor; no rash  Neurological: Sedated, pupils nonresponsive b/l       LINES: L fem [place], [facility] CVC, [facility] MEDICATIONS:  MEDICATIONS  (STANDING):  cefTRIAXone   IVPB 1000 milliGRAM(s) IV Intermittent every 24 hours  chlorhexidine 0.12% Liquid 15 milliLiter(s) Oral Mucosa every 12 hours  chlorhexidine 4% Liquid 1 Application(s) Topical <User Schedule>  CRRT Treatment    <Continuous>  insulin lispro (ADMELOG) corrective regimen sliding scale   SubCutaneous every 6 hours  ketamine Infusion. 0.25 mG/kG/Hr (2.25 mL/Hr) IV Continuous <Continuous>  norepinephrine Infusion 0.05 MICROgram(s)/kG/Min (4.22 mL/Hr) IV Continuous <Continuous>  pantoprazole  Injectable 40 milliGRAM(s) IV Push every 12 hours  PrismaSATE Dialysate BGK 4 / 2.5 5000 milliLiter(s) (1200 mL/Hr) CRRT <Continuous>  PrismaSOL Filtration BGK 0 / 2.5 5000 milliLiter(s) (200 mL/Hr) CRRT <Continuous>  PrismaSOL Filtration BGK 4 / 2.5 5000 milliLiter(s) (1000 mL/Hr) CRRT <Continuous>  vasopressin Infusion 0.04 Unit(s)/Min (2.4 mL/Hr) IV Continuous <Continuous>    MEDICATIONS  (PRN):      LABS:                        6.9    16.73 )-----------( 37       ( 22 Jun 2021 05:52 )             20.8     06-22    142  |  110<H>  |  41<H>  ----------------------------<  111<H>  4.4   |  13<L>  |  2.65<H>    Ca    7.3<L>      22 Jun 2021 05:52  Phos  3.4     06-21  Mg     2.0     06-22    TPro  4.4<L>  /  Alb  1.9<L>  /  TBili  0.3  /  DBili  x   /  AST  20  /  ALT  8<L>  /  AlkPhos  89  06-22    PT/INR - ( 22 Jun 2021 05:52 )   PT: 16.7 sec;   INR: 1.41 ratio         PTT - ( 22 Jun 2021 05:52 )  PTT:40.0 sec    Arterial Blood Gas:  06-22 @ 05:56  7.38/27/80/16/97/-8.1  ABG lactate: --  Arterial Blood Gas:  06-21 @ 04:17  7.34/31/85/16/98/-8.3  ABG lactate: --  Arterial Blood Gas:  06-21 @ 00:32  7.31/32/89/15/97/-9.6  ABG lactate: --  Arterial Blood Gas:  06-20 @ 22:50  7.30/33/74/16/96/-9.5  ABG lactate: --  Arterial Blood Gas:  06-20 @ 20:41  7.21/42/69/16/93/-10.4  ABG lactate: --    Venous Blood Gas:  06-20 @ 11:40  7.21/40/45/15/82  VBG Lactate: 0.8      MICROBIOLOGY:     RADIOLOGY:  [ ] Reviewed and interpreted by me    EKG:

## 2021-06-22 NOTE — CHART NOTE - NSCHARTNOTEFT_GEN_A_CORE
Attempted outreach to patient spouse per number in EMR/Care coordination note.  No answer, voicemail void of PHI with contact number provided.  will reattempt at later date. [phone] Elidel Counseling: Patient may experience a mild burning sensation during topical application. Elidel is not approved in children less than 2 years of age. There have been case reports of hematologic and skin malignancies in patients using topical calcineurin inhibitors although causality is questionable.

## 2021-06-22 NOTE — PROGRESS NOTE ADULT - ATTENDING COMMENTS
Agree with above  Remains on CVVHD. Drop in Hgb may be due to clotted catheter that results in loss of blood.  Palliative care consult.  Remains on pressors.  Very poor overall prognosis  D/C antibiotics    I have personally provided 45 minutes of critical care time.

## 2021-06-22 NOTE — PROGRESS NOTE ADULT - ATTENDING COMMENTS
# WALDO/ ATN - oliguric. ATN secondary to hemorrhagic shock. Started on CRRT yesterday for volume overload. Unfortunately CRRT machine clotted twice. He has a left IJ dialysis catheter (tip at SVC). The tip of catheter may be too high, causing turbulent flow, resulting in easy clotting.     Plan: we will resume CRRT using a high blow flow rate (300ml/min). If CRRT continues to clot with higher blood flow, suggest to switch dialysis catheter to the right IJ.     # CKD - baseline serum creatinine of 2.5mg/dL. Etiology unclear. Unable to do urine studies to investigate further given that he is oliguric.       The rest of the recommendations as per fellow's note.    Lillie Muro MD  Attending Nephrologist  [phone]

## 2021-06-22 NOTE — CHART NOTE - NSCHARTNOTEFT_GEN_A_CORE
Imaging shows no active bleed, AAA, and b/l iliac artery aneurysms. Despite possible ongoing bleeding, yield of angio is low and risks of significant vascular injury are considered moderate. Continue medical management, including platelet and INR correction. Imaging shows no active bleed, AAA, and b/l iliac artery aneurysms. Despite possible ongoing bleeding, yield of angio is low and risks of significant vascular injury are considered atleast moderate, especially in the context of severe coagulopathy. Continue medical management, including platelet and INR correction.    Will sign off for now, re-consult as needed after coagulopathy correction, if possible

## 2021-06-22 NOTE — PROGRESS NOTE ADULT - ASSESSMENT
81M PMH thrombocytopenia, anemia, AAA, DVT s/p IVC filter, duodenal perforation 4/28/21 s/p amy patch plication and J-tube, H.pylori s/p triple therapy, DM, HTN, HLD, severe dementia (AAOx1 at baseline), CKD, recent admission 6/3-6/15 for GIB now admitted again for repeat GIB. Course complicated by hemorrhagic shock.     NEURO  - intubated/sedated with Ketamine  - fent gtt d/c'd   - Baseline advanced dementia, AAOx1    PULM  - Intubated for respiratory failure in setting of hemorrhagic shock and metabolic acidosis   - on minimal vent settings    CV  #hypotension  - c/w Vaso, Levo for hemorrhagic shock  - weaned off harvey     GI  #GIB  - s/p perforated duodenal ulcer s/p amy patch plication with J tube 4/28/21  - CTA A/P 6/15 negative for active bleeding. Recent EGD and tagged RBC scan in early 6/2021 w/o source of bleeding but did note large deep ulcer in the anterior duodenal bulb and large flat ulcer over the posterior duodenal bulb without visible vessel.  - GI and surgery following  - continue PPI IV BID  - EGD showing numerous nonbleeding duodenal ulcers  - capsule study, w/ duodenitis, fresh blood seen in small intestine   - continuing to require transfusions daily, no recent melena        RENAL  #CKD  - SCr 2.5, at baseline  - now with worsening renal function/anuric  - LIJ HD cath placed; CRRT started     ENDO  #DM  - ISS q6h, monitor FSG    ID  - empiric Vanc/Mariely (6/19- 6/21)  - CTX 6/20-  - cultures NGTD    HEME  # acute blood loss anemia 2/2 UGIB  - GIB plan as above  - s/p MTP 6/19 PM  - monitor CBC q4h    #DVT  - s/p IVC filter  - SCDs, no antithrombotics    #thrombocytopenia  - monitor plts, transfuse for plts<50    ETHICS  - full code

## 2021-06-23 NOTE — PROGRESS NOTE ADULT - SUBJECTIVE AND OBJECTIVE BOX
Chief Complaint:  Patient is a 81y old  Male who presents with a chief complaint of GIB (2021 07:40)      Interval Events:   Patient with hemoglobin in the 7's.  IR has signed off stating no intervention.    Allergies:  No Known Allergies      Hospital Medications:  chlorhexidine 0.12% Liquid 15 milliLiter(s) Oral Mucosa every 12 hours  chlorhexidine 4% Liquid 1 Application(s) Topical <User Schedule>  CRRT Treatment    <Continuous>  insulin lispro (ADMELOG) corrective regimen sliding scale   SubCutaneous every 6 hours  ketamine Infusion. 0.25 mG/kG/Hr IV Continuous <Continuous>  norepinephrine Infusion 0.05 MICROgram(s)/kG/Min IV Continuous <Continuous>  pantoprazole  Injectable 40 milliGRAM(s) IV Push every 12 hours  Phoxillum Filtration BK 4 / 2.5 5000 milliLiter(s) CRRT <Continuous>  potassium phosphate IVPB 30 milliMole(s) IV Intermittent once  PrismaSOL Filtration BGK 4 / 2.5 5000 milliLiter(s) CRRT <Continuous>  PrismaSOL Filtration BGK 4 / 2.5 5000 milliLiter(s) CRRT <Continuous>      PMHX/PSHX:  Thrombocytopenia    Anemia    Abdominal aortic aneurysm    Deep vein thrombosis (DVT)    Diabetes mellitus    Hypertension    Hyperlipidemia    Duodenal ulcer    S/P IVC filter    H/O: duodenal ulcer    H/O exploratory laparotomy        Family history:      ROS:  General: no night sweats, wt loss  CV: no pain, palpitation  Resp: no cough wheezing  Muscles: no weakness or pain  Endocrine: no polyuria, polydipsia, cold/heat intolerance  Heme: No petechia, ecchymosis    PHYSICAL EXAM:   GENERAL:  NAD  HEENT:  NC/AT,  conjunctivae clear, sclera -anicteric  CHEST:  Full & symmetric excursion, no increased  HEART:  Regular rhythm  ABDOMEN:  Soft, non-tender, non-distended, normoactive bowel sounds,  no masses ,no hepato-splenomegaly,   EXTREMITIES:  no cyanosis,clubbing or edema  SKIN:  No rash/erythema/ecchymoses/petechiae/wounds/abscess/warm/dry  NEURO:  Alert, oriented    Vital Signs:  Vital Signs Last 24 Hrs  T(C): 37.3 (2021 07:00), Max: 37.4 (2021 03:00)  T(F): 99.1 (2021 07:00), Max: 99.3 (2021 03:00)  HR: 80 (2021 07:45) (68 - 87)  BP: 103/56 (2021 07:30) (11/64 - 138/74)  BP(mean): 72 (2021 07:30) (67 - 97)  RR: 32 (2021 07:00) (20 - 41)  SpO2: 100% (2021 07:45) (90% - 100%)  Daily     Daily Weight in k.9 (2021 00:15)    LABS:                        7.2    16.45 )-----------( 33       ( 2021 06:33 )             21.4     06-23    140  |  107  |  27<H>  ----------------------------<  166<H>  3.3<L>   |  19<L>  |  1.75<H>    Ca    7.4<L>      2021 06:34  Phos  1.3       Mg     2.0         TPro  4.6<L>  /  Alb  2.0<L>  /  TBili  0.3  /  DBili  x   /  AST  25  /  ALT  9<L>  /  AlkPhos  129<H>      LIVER FUNCTIONS - ( 2021 23:49 )  Alb: 2.0 g/dL / Pro: 4.6 g/dL / ALK PHOS: 129 U/L / ALT: 9 U/L / AST: 25 U/L / GGT: x           PT/INR - ( 2021 23:49 )   PT: 16.9 sec;   INR: 1.43 ratio         PTT - ( 2021 23:49 )  PTT:36.3 sec        Imaging:

## 2021-06-23 NOTE — PROGRESS NOTE ADULT - ASSESSMENT
81M PMH thrombocytopenia, anemia, AAA, DVT s/p IVC filter, duodenal perforation 4/28/21 s/p amy patch plication and J-tube, H.pylori s/p triple therapy, DM, HTN, HLD, severe dementia (AAOx1 at baseline), CKD, recent admission 6/3-6/15 for GIB now admitted again for repeat GIB. Course complicated by hemorrhagic shock.     NEURO  - intubated/sedated with Ketamine  - fent gtt d/c'd   - wean sedation as tolerated for CPAP trial  - Baseline advanced dementia, AAOx1    PULM  - Intubated for respiratory failure in setting of hemorrhagic shock and metabolic acidosis   - on minimal vent settings    CV  #hypotension  - c/w Levo for hemorrhagic shock  - weaned off harvey and vaso     GI  #GIB  - s/p perforated duodenal ulcer s/p amy patch plication with J tube 4/28/21  - CTA A/P 6/15 negative for active bleeding. Recent EGD and tagged RBC scan in early 6/2021 w/o source of bleeding but did note large deep ulcer in the anterior duodenal bulb and large flat ulcer over the posterior duodenal bulb without visible vessel.  - GI and surgery following  - continue PPI IV BID  - EGD showing numerous nonbleeding duodenal ulcers  - capsule study, w/ duodenitis, fresh blood seen in small intestine   - continuing to require transfusions daily, continued melena        RENAL  #CKD  - SCr 2.5, at baseline  - CRRT started for worsening renal function/anuria, now with some increase in UOP  - LIJ HD cath placed; CRRT started     ENDO  #DM  - ISS q6h, monitor FSG    ID  - empiric Vanc/Mariely (6/19- 6/21)  - CTX 6/20-6/22  - monitor off Abx  - cultures NGTD    HEME  # acute blood loss anemia 2/2 UGIB  - GIB plan as above  - s/p MTP 6/19 PM  - monitor CBC q6h    #DVT  - s/p IVC filter  - SCDs, no antithrombotics    #thrombocytopenia  - monitor plts, transfuse for plts<50    ETHICS  - full code

## 2021-06-23 NOTE — PROCEDURE NOTE - ADDITIONAL PROCEDURE DETAILS
Right Internal Jugular Double Lumen 14Fr catheter inserted for Dialysis/CRRT. Wire confirmed via POCUS. Post procedure VBG sent, POCUS shows lung sliding b/l. CXR to follow.    Irasema Rubi PGY2  MICU

## 2021-06-23 NOTE — PROCEDURE NOTE - NSPOSTCAREGUIDE_GEN_A_CORE
Care for catheter as per unit/ICU protocols
Care for catheter as per unit/ICU protocols
Verbal/written post procedure instructions were given to patient/caregiver/Instructed patient/caregiver to follow-up with primary care physician/Instructed patient/caregiver regarding signs and symptoms of infection/Keep the cast/splint/dressing clean and dry
Verbal/written post procedure instructions were given to patient/caregiver/Instructed patient/caregiver to follow-up with primary care physician/Instructed patient/caregiver regarding signs and symptoms of infection/Keep the cast/splint/dressing clean and dry/Care for catheter as per unit/ICU protocols

## 2021-06-23 NOTE — PROCEDURE NOTE - NSPROCNAME_GEN_A_CORE
Tracheal Intubation
Central Line Insertion
Arterial Puncture/Cannulation
Central Line Insertion

## 2021-06-23 NOTE — PROGRESS NOTE ADULT - ATTENDING COMMENTS
Agree with above  Remains intubated on CVVHD  Hgb continues to drop with some melenic stools, but also multiple clotting episodes of HD circuit requiring wash back.  Change HD cath. Not anticoagulating circuit d/t persistent GIB.  Attempting to down-titrate pressors. Levophed requirements are stable / decreasing, off vasopressin. Place TLC for definitive vascular access.  Goals of care discussions are being attempted, but family contact is sporadic.  Continue to trend CBC for ABLA  Urine output somewhat higher now.  GI following, note appreciated: given multiple failed endoscopies (i.e., negative for overt bleeding with no intervention possible due to lack of definitive source), may consider IR consultation to determine if there are any other possible interventions possible.  Very poor overall prognosis given advanced dementia at baseline and persistent GIB with no clear source.  Titrate down sedatives to evaluate mental status. Decrease Ketamine, add Precedex for baseline anxiolysis.    I have personally provided 45 minutes of critical care time.

## 2021-06-23 NOTE — PROGRESS NOTE ADULT - ASSESSMENT
Impression:  81M PMH thrombocytopenia, anemia, AAA, DVT s/p IVC filter, duodenal perforation 4/28/21 s/p amy patch plication and J-tube, H.pylori s/p triple therapy, DM, HTN, HLD, severe dementia (AAOx1 at baseline) recent admission for GIB at [facility]  #GIB - with continued dropping hemoglobin and blood in stool s/p EGD with multiple healing ulcers no active source of bleed now with continued dropping hemoglobin, s/p repeat EGD/colonoscopy w/ blood in colon (no source identified), EGD w/ clean based ulcers. Capsule endoscopy deployed with bleeding seen in small intestine.    Recommendations:  - trend CBC  - if patient rebleeds would strongly suggest IR intervention as he has failed multiple endoscopies including 4 upper endoscopies which where negative for bleeding  - [facility] care [facility]  Gastroenterology Fellow  Pager x [phone] or [phone]  Please page on-call Fellow on weekends/holidays or after 5 pm and before 8 am on weekdays   On-call GI fellow can be contacted via the answering service ([phone])

## 2021-06-23 NOTE — PROCEDURE NOTE - NSINFORMCONSENT_GEN_A_CORE
This was an emergent procedure.

## 2021-06-23 NOTE — PROGRESS NOTE ADULT - SUBJECTIVE AND OBJECTIVE BOX
CHIEF COMPLAINT: GIB    Interval Events: Overnight pt had 3 large melanotic stools.     REVIEW OF SYSTEMS:  [ x ] Unable to assess ROS because intubation/sedation     OBJECTIVE:  ICU Vital Signs Last 24 Hrs  T(C): 37.3 (23 Jun 2021 07:00), Max: 37.4 (23 Jun 2021 03:00)  T(F): 99.1 (23 Jun 2021 07:00), Max: 99.3 (23 Jun 2021 03:00)  HR: 80 (23 Jun 2021 07:00) (68 - 87)  BP: 108/59 (23 Jun 2021 07:00) (11/64 - 138/74)  BP(mean): 78 (23 Jun 2021 07:00) (67 - 97)  ABP: 118/54 (23 Jun 2021 07:00) (73/39 - 147/71)  ABP(mean): 69 (23 Jun 2021 07:00) (48 - 93)  RR: 32 (23 Jun 2021 07:00) (20 - 41)  SpO2: 100% (23 Jun 2021 07:00) (90% - 100%)    Mode: AC/ CMV (Assist Control/ Continuous Mandatory Ventilation), RR (machine): 25, TV (machine): 500, FiO2: 30, PEEP: 5, ITime: 0.85, MAP: 8, PIP: 20    06-22 @ 07:01  -  06-23 @ 07:00  --------------------------------------------------------  IN: 3583.7 mL / OUT: 5850 mL / NET: -2266.3 mL      CAPILLARY BLOOD GLUCOSE  163 (23 Jun 2021 06:00)      POCT Blood Glucose.: 163 mg/dL (23 Jun 2021 05:56)      PHYSICAL EXAM:  General: intubated, sedated   HEENT: NC/AT; PERRL, clear conjunctiva. Small, sluggish pupils  Respiratory: Rhonchi b/l   Cardiovascular: +S1/S2; RRR  Abdomen: soft, NT/ND; hypoactive bowels  Extremities: WWP, 2+ peripheral pulses b/l; edema in all extremities  Skin: normal color and turgor; no rash  Neurological: Sedated, pupils nonresponsive b/l     [facility] MEDICATIONS:  MEDICATIONS  (STANDING):  chlorhexidine 0.12% Liquid 15 milliLiter(s) Oral Mucosa every 12 hours  chlorhexidine 4% Liquid 1 Application(s) Topical <User Schedule>  CRRT Treatment    <Continuous>  insulin lispro (ADMELOG) corrective regimen sliding scale   SubCutaneous every 6 hours  ketamine Infusion. 0.25 mG/kG/Hr (2.25 mL/Hr) IV Continuous <Continuous>  norepinephrine Infusion 0.05 MICROgram(s)/kG/Min (8.45 mL/Hr) IV Continuous <Continuous>  pantoprazole  Injectable 40 milliGRAM(s) IV Push every 12 hours  potassium phosphate IVPB 30 milliMole(s) IV Intermittent once  PrismaSATE Dialysate BGK 4 / 2.5 5000 milliLiter(s) (1200 mL/Hr) CRRT <Continuous>  PrismaSOL Filtration BGK 0 / 2.5 5000 milliLiter(s) (200 mL/Hr) CRRT <Continuous>  PrismaSOL Filtration BGK 4 / 2.5 5000 milliLiter(s) (1000 mL/Hr) CRRT <Continuous>    MEDICATIONS  (PRN):      LABS:                        7.2    16.45 )-----------( 33       ( 23 Jun 2021 06:33 )             21.4     06-23    140  |  107  |  27<H>  ----------------------------<  166<H>  3.3<L>   |  19<L>  |  1.75<H>    Ca    7.4<L>      23 Jun 2021 06:34  Phos  1.3     06-23  Mg     2.0     06-23    TPro  4.6<L>  /  Alb  2.0<L>  /  TBili  0.3  /  DBili  x   /  AST  25  /  ALT  9<L>  /  AlkPhos  129<H>  06-22    PT/INR - ( 22 Jun 2021 23:49 )   PT: 16.9 sec;   INR: 1.43 ratio         PTT - ( 22 Jun 2021 23:49 )  PTT:36.3 sec    Arterial Blood Gas:  06-22 @ 23:47  7.48/28/93/21/98/-2.1  ABG lactate: --  Arterial Blood Gas:  06-22 @ 05:56  7.38/27/80/16/97/-8.1  ABG lactate: --        MICROBIOLOGY:     RADIOLOGY:  [ ] Reviewed and interpreted by me    EKG:

## 2021-06-23 NOTE — PROGRESS NOTE ADULT - ASSESSMENT
Pt. with WALDO on CKD in the setting of hemorrhagic shock    # WALDO/ ATN - oliguric. ATN secondary to hemorrhagic shock. Started on CRRT since 6/21/22 for volume overload. Unable to proceed with CRRT as line kept clotting.   - suggest to switch the dialysis catheter line. If persistent clotting, then we will start CRRT using citrate.   - Bladder scan 250ml. Continue to check urine output to monitor for renal recovery.     # CKD - baseline serum creatinine of 2.5mg/dL. Etiology unclear. Unable to do urine studies to investigate further given that he is oliguric.     # Hypophosphatemia - secondary to CRRT. Will change to phoxillum solution.       The rest of the recommendations as per fellow's note.    Lillie Muro MD  Attending Nephrologist  [phone]

## 2021-06-23 NOTE — PROCEDURE NOTE - NSPOSTPRCRAD_GEN_A_CORE
post-procedure radiography performed
no pneumothorax
central line located in the superior vena cava/no pneumothorax

## 2021-06-23 NOTE — PROGRESS NOTE ADULT - SUBJECTIVE AND OBJECTIVE BOX
[facility] Division of Kidney Diseases & Hypertension  HEMODIALYSIS NOTE  --------------------------------------------------------------------------------  Chief Complaint: ESRD/Ongoing hemodialysis requirement    24 hour events/subjective:  Had 3 episodes of melena.   CRRT line clotted twice overnight.     PAST HISTORY  --------------------------------------------------------------------------------  No significant changes to PMH, PSH, FHx, SHx, unless otherwise noted    ALLERGIES & MEDICATIONS  --------------------------------------------------------------------------------  Allergies    No Known Allergies    Intolerances      Standing Inpatient Medications  chlorhexidine 0.12% Liquid 15 milliLiter(s) Oral Mucosa every 12 hours  chlorhexidine 4% Liquid 1 Application(s) Topical <User Schedule>  CRRT Treatment    <Continuous>  insulin lispro (ADMELOG) corrective regimen sliding scale   SubCutaneous every 6 hours  ketamine Infusion. 0.25 mG/kG/Hr IV Continuous <Continuous>  norepinephrine Infusion 0.05 MICROgram(s)/kG/Min IV Continuous <Continuous>  pantoprazole  Injectable 40 milliGRAM(s) IV Push every 12 hours  Phoxillum Filtration BK 4 / 2.5 5000 milliLiter(s) CRRT <Continuous>  potassium phosphate IVPB 30 milliMole(s) IV Intermittent once  PrismaSOL Filtration BGK 4 / 2.5 5000 milliLiter(s) CRRT <Continuous>  PrismaSOL Filtration BGK 4 / 2.5 5000 milliLiter(s) CRRT <Continuous>    PRN Inpatient Medications      REVIEW OF SYSTEMS  --------------------------------------------------------------------------------  Unable to assess due to clinical condition     VITALS/PHYSICAL EXAM  --------------------------------------------------------------------------------  T(C): 37.6 (06-23-21 @ 10:00), Max: 37.6 (06-23-21 @ 10:00)  HR: 81 (06-23-21 @ 11:15) (69 - 87)  BP: 107/62 (06-23-21 @ 11:00) (11/64 - 123/71)  RR: 32 (06-23-21 @ 11:00) (20 - 43)  SpO2: 97% (06-23-21 @ 11:15) (90% - 100%)  Wt(kg): --        06-22-21 @ 07:01  -  06-23-21 @ 07:00  --------------------------------------------------------  IN: 3583.7 mL / OUT: 5850 mL / NET: -2266.3 mL    06-23-21 @ 07:01  -  06-23-21 @ 11:33  --------------------------------------------------------  IN: 1133.7 mL / OUT: 451 mL / NET: 682.7 mL      Physical Exam:  	Gen: NAD  	HEENT: Intubated, left IJ non-tunneled dialysis.   	Pulm: CTA B/L  	CV: RRR, S1S2; no rub  	Abd: +BS, soft, nontender/nondistended  	: No suprapubic tenderness  	UE: Warm, + edema  	LE: Warm, + edema  	Psych: Unable to assess due to clinical condition   	Skin: Warm, without rashes  	Vascular access: left IJ non-tunneled dialysis catheter    LABS/STUDIES  --------------------------------------------------------------------------------              7.2    16.45 >-----------<  33       [06-23-21 @ 06:33]              21.4     140  |  107  |  27  ----------------------------<  166      [06-23-21 @ 06:34]  3.3   |  19  |  1.75        Ca     7.4     [06-23-21 @ 06:34]      Mg     2.0     [06-23-21 @ 06:34]      Phos  1.3     [06-23-21 @ 06:34]    TPro  4.6  /  Alb  2.0  /  TBili  0.3  /  DBili  x   /  AST  25  /  ALT  9   /  AlkPhos  129  [06-22-21 @ 23:49]    PT/INR: PT 16.9 , INR 1.43       [06-22-21 @ 23:49]  PTT: 36.3       [06-22-21 @ 23:49]

## 2021-06-23 NOTE — PROCEDURE NOTE - ADDITIONAL PROCEDURE DETAILS
Triple Lumen 7Fr catheter inserted under ultrasound guidance into Left Internal Jugular Vein. Wire confirmed via US. Post procedure VBG sent, POCUS shows lung sliding b/l. CXR to follow.    Irasema Rubi, PGY2  MICU

## 2021-06-23 NOTE — PROCEDURE NOTE - NSINDICATIONS_GEN_A_CORE
emergency venous access
dialysis/CRRT
dialysis/CRRT
critical illness/venous access/volume resuscitation
respiratory failure
arterial puncture to obtain ABG's

## 2021-06-23 NOTE — PROCEDURE NOTE - PROCEDURE DATE TIME, MLM
18-Jun-2021 18:56
20-Jun-2021 11:55
23-Jun-2021 15:20
18-Jun-2021 19:00
18-Jun-2021 18:54
23-Jun-2021 19:34

## 2021-06-23 NOTE — CHART NOTE - NSCHARTNOTEFT_GEN_A_CORE
ACS Follow Up Note:    Patient with history of Ijm patch and j-tube placement for perforated duodenal ulcer 2 months ago now admitted to Medicine for GIB which currently is not localized.  Prior to admission patient was eating with J tube clamped. Initial surgical consult recommendations were to place J tube to gravity to help with possible differentiating upper GI bleed vs lower GI bleed. Patient now receiving tube feeds via OG tube with some chyle and feeds draining from J-tube. No contraindication to clamping j-tube at this time. Continue management as per MICU team / GI.

## 2021-06-24 NOTE — PROGRESS NOTE ADULT - ATTENDING SUPERVISION STATEMENT
Fellow
ACP
ACP
Fellow
Resident
Fellow
Resident
Resident
Fellow

## 2021-06-24 NOTE — PROGRESS NOTE ADULT - ATTENDING COMMENTS
Agree with above.  Attempted pressure support 10/5 and downtitration of sedation in order to determine mental status. However, patient became very tachypneic (RR 50s) and became discordant with vent, so sedation had to be resumed.  Hemoglobin stable with no clotting of HD circuit after replacement of HD catheter. Continue to monitor Hgb  Very poor overall prognosis, as baseline mental status is poor (given H/O dementia)  Clamp J-tube and attempt trickle feeds through G-tube.  Patient will remain in ICU while orotracheally intubated on CVVHD.  Goals of care discussions ongoing.    I have personally provided 45 minutes of critical care time.

## 2021-06-24 NOTE — PROGRESS NOTE ADULT - ASSESSMENT
81M PMH thrombocytopenia, anemia, AAA, DVT s/p IVC filter, duodenal perforation 4/28/21 s/p amy patch plication and J-tube, H.pylori s/p triple therapy, DM, HTN, HLD, severe dementia (AAOx1 at baseline), CKD, recent admission 6/3-6/15 for GIB now admitted again for repeat GIB. Course complicated by hemorrhagic shock.     NEURO  - intubated/sedated with Ketamine and Precedex   - fent gtt d/c'd   - wean sedation as tolerated for CPAP trial  - Baseline advanced dementia, AAOx1    PULM  - Intubated for respiratory failure in setting of hemorrhagic shock and metabolic acidosis   - on minimal vent settings    CV  #hypotension  - c/w Levo for hemorrhagic shock  - weaned off harvey and vaso     GI  #GIB  - s/p perforated duodenal ulcer s/p amy patch plication with J tube 4/28/21  - CTA A/P 6/15 negative for active bleeding. Recent EGD and tagged RBC scan in early 6/2021 w/o source of bleeding but did note large deep ulcer in the anterior duodenal bulb and large flat ulcer over the posterior duodenal bulb without visible vessel.  - GI and surgery following  - continue PPI IV BID  - EGD showing numerous nonbleeding duodenal ulcers  - capsule study, w/ duodenitis, fresh blood seen in small intestine   - continuing to require multiple transfusions daily, intermittent melena      RENAL  #CKD  - SCr 2.5, at baseline  - CRRT started 6/20 for worsening renal function/anuria, now with some increase in UOP  - [facility] to [facility] 6/23   - pt repeatedly clotting filter; unable to heparinize filter 2/2 bleeding     ENDO  #DM  - ISS q6h, monitor FSG    ID  - empiric Vanc/Mariely (6/19- 6/21)  - CTX 6/20-6/22  - monitor off Abx  - cultures NGTD    HEME  # acute blood loss anemia 2/2 UGIB  - GIB plan as above  - s/p MTP 6/19 PM  - monitor CBC q6h  - plan as above    #DVT  - s/p IVC filter  - SCDs, no antithrombotics    #thrombocytopenia  - monitor plts, transfuse for plts<50 if bleeding    ETHICS  - full code

## 2021-06-24 NOTE — PROGRESS NOTE ADULT - ASSESSMENT
Pt. with WALDO on CKD in the setting of hemorrhagic shock    # WALDO/ ATN - oliguric. ATN secondary to hemorrhagic shock. Started on CRRT since 6/21/22 for volume overload. CRRT with no clotting after switching to RIJ non-tunneled HD catheter.   - Continue CRRT today. Plans for weaning off sedation and possible extubation. Will consider switching to iHD or diuretic change once off vasopressors and extubated.  - Bladder scan 250ml. Continue to check urine output to monitor for renal recovery.     # CKD - baseline serum creatinine of 2.5mg/dL. Etiology unclear. Spot urine TP/Cr elevated at ~2.6 g/g/ Please send HepB SAg, Hep C Ab, serum immunofixation, and free light chain panel.     # Hypophosphatemia - secondary to CRRT. Resolved after switching to phoxillum solution.     If you have any questions, please feel free to contact me  Alfonso Meeks  Nephrology Fellow  [phone]  (After 5pm or on weekends please page the on-call fellow)

## 2021-06-24 NOTE — CHART NOTE - NSCHARTNOTEFT_GEN_A_CORE
Discussed with primary team.  Family meeting attempting to be set up for tomorrow, however, family available likely next week.  If family meeting set up for tomorrow, please let us know to ensure provider availability.  this provider available 12pm, 2pm, 3pm on 6/25. Otherwise, will follow up 6/28.  Please page if acute issues. [phone]

## 2021-06-24 NOTE — PROGRESS NOTE ADULT - SUBJECTIVE AND OBJECTIVE BOX
[facility] Division of Kidney Diseases & Hypertension  FOLLOW UP NOTE  [phone]--------------------------------------------------------------------------------    Chief Complaint: WALDO on CKD, Hemorrhagic shock    24 hour events/subjective:  Had 1 episode of melena.   CRRT with no clotting overnight after changing HD catheter to [facility] non-tunneled HD catheter    PAST HISTORY  --------------------------------------------------------------------------------  No significant changes to PMH, PSH, FHx, SHx, unless otherwise noted    ALLERGIES & MEDICATIONS  --------------------------------------------------------------------------------  Allergies    No Known Allergies    Intolerances    Standing Inpatient Medications  calcium gluconate IVPB 2 Gram(s) IV Intermittent once  chlorhexidine 0.12% Liquid 15 milliLiter(s) Oral Mucosa every 12 hours  chlorhexidine 4% Liquid 1 Application(s) Topical <User Schedule>  CRRT Treatment    <Continuous>  dexMEDEtomidine Infusion 0.5 MICROgram(s)/kG/Hr IV Continuous <Continuous>  insulin lispro (ADMELOG) corrective regimen sliding scale   SubCutaneous every 6 hours  ketamine Infusion. 0.25 mG/kG/Hr IV Continuous <Continuous>  norepinephrine Infusion 0.05 MICROgram(s)/kG/Min IV Continuous <Continuous>  pantoprazole  Injectable 40 milliGRAM(s) IV Push every 12 hours  Phoxillum Filtration BK 4 / 2.5 5000 milliLiter(s) CRRT <Continuous>  PrismaSOL Filtration BGK 4 / 2.5 5000 milliLiter(s) CRRT <Continuous>  PrismaSOL Filtration BGK 4 / 2.5 5000 milliLiter(s) CRRT <Continuous>    REVIEW OF SYSTEMS  --------------------------------------------------------------------------------  Unable to obtain due to medical condition    VITALS/PHYSICAL EXAM  --------------------------------------------------------------------------------  T(C): 36.9 (06-24-21 @ 06:00), Max: 37.6 (06-23-21 @ 10:00)  HR: 71 (06-24-21 @ 08:00) (68 - 83)  BP: 97/58 (06-24-21 @ 08:00) (83/54 - 149/81)  RR: 35 (06-24-21 @ 08:00) (29 - 44)  SpO2: 98% (06-24-21 @ 08:00) (96% - 99%)  Wt(kg): --    06-23-21 @ 07:01  -  06-24-21 @ 07:00  --------------------------------------------------------  IN: 4114.8 mL / OUT: 4337 mL / NET: -222.2 mL    06-24-21 @ 07:01  -  06-24-21 @ 08:52  --------------------------------------------------------  IN: 66.5 mL / OUT: 129 mL / NET: -62.5 mL    Physical Exam:              Gen: Intubated  	HEENT: Intubated, ET tube +  	Pulm: Fair air entry  	CV: RRR, S1S2;  	Abd: +BS, soft, nontender/distended  	UE: Warm, + edema  	LE: Warm, + edema  	Psych: Unable to assess due to clinical condition   	Skin: Warm, without rashes  	Vascular access: Right IJ non-tunneled dialysis catheter    LABS/STUDIES  --------------------------------------------------------------------------------              8.2    17.03 >-----------<  41       [06-24-21 @ 05:55]              23.5     141  |  107  |  22  ----------------------------<  86      [06-24-21 @ 05:55]  3.6   |  15  |  1.49        Ca     7.1     [06-24-21 @ 05:55]      Mg     2.1     [06-24-21 @ 05:55]      Phos  2.6     [06-24-21 @ 05:55]    TPro  4.6  /  Alb  2.0  /  TBili  0.6  /  DBili  x   /  AST  31  /  ALT  14  /  AlkPhos  176  [06-24-21 @ 01:18]    PT/INR: PT 15.5 , INR 1.31       [06-24-21 @ 01:18]  PTT: 35.3       [06-24-21 @ 01:18]    Creatinine Trend:  SCr 1.49 [06-24 @ 05:55]  SCr 1.65 [06-24 @ 01:18]  SCr 1.86 [06-23 @ 19:51]  SCr 1.84 [06-23 @ 11:26]  SCr 1.75 [06-23 @ 06:34]    Urinalysis - [06-18-21 @ 21:44]      Color Yellow / Appearance Slightly Turbid / SG 1.023 / pH 5.5      Gluc Negative / Ketone Negative  / Bili Negative / Urobili Negative       Blood Small / Protein 100 / Leuk Est Moderate / Nitrite Negative      RBC 7 / WBC 15 / Hyaline 11 / Gran  / Sq Epi  / Non Sq Epi 6 / Bacteria Negative    Urine Creatinine 55      [06-23-21 @ 07:16]  Urine Protein 142      [06-23-21 @ 07:16]

## 2021-06-24 NOTE — PROGRESS NOTE ADULT - ATTENDING COMMENTS
Patient seen and examined on CRRT.     # WALDO/ ATN - oliguric. ATN secondary to hemorrhagic shock. Started on CRRT for volume overload. Filter clotted several times due to line position issues. Since dialysis catheter has been switched to the right IJ, no further clotting issues. Continue CRRT today.     # CKD - baseline serum creatinine of 2.5mg/dL. Etiology unclear. Unable to do urine studies to investigate further given that he is oliguric.       The rest of the recommendations as per fellow's note.    Lillie Muro MD  Attending Nephrologist  [phone]

## 2021-06-25 NOTE — PROGRESS NOTE ADULT - ASSESSMENT
Pt. with WALDO on CKD in the setting of hemorrhagic shock    # WALDO/ ATN - oliguric. ATN secondary to hemorrhagic shock. Started on CRRT since 6/21/22 for volume overload. CRRT with no clotting after switching to RIJ non-tunneled HD catheter.   - Pt. remains critically ill with increasing pressor requirements. As per discussion with MICU family leaning towards comfort measures. Pt. made DNR/DNI. Plan is to not escalate care and will continue CRRT for now until family visit this morning.  - Bladder scan 250ml. Continue to check urine output to monitor for renal recovery.     # CKD - baseline serum creatinine of 2.5mg/dL. Etiology unclear. Spot urine TP/Cr elevated at ~2.6 g/g/. Overall prognosis extremely guarded. Defer work up for now given patient is now being managed on the lines of comfort focused care.    # Hypophosphatemia - secondary to CRRT. Resolved after switching to phoxillum solution.     If you have any questions, please feel free to contact me  Alfonso Meeks  Nephrology Fellow  [phone]  (After 5pm or on weekends please page the on-call fellow)

## 2021-06-25 NOTE — PROGRESS NOTE ADULT - PROBLEM SELECTOR PROBLEM 1
Acute kidney injury superimposed on CKD

## 2021-06-25 NOTE — PROGRESS NOTE ADULT - NUTRITIONAL ASSESSMENT
This patient has been assessed with a concern for Malnutrition and has been determined to have a diagnosis/diagnoses of Severe protein-calorie malnutrition.    This patient is being managed with:   Diet NPO with Tube Feed-  Tube Feeding Modality: Orogastric  Nepro with Carb Steady (NEPRORTH)  Total Volume for 24 Hours (mL): 1320  Continuous  Starting Tube Feed Rate {mL per Hour}: 40  Increase Tube Feed Rate by (mL): 15     Every 4 hours  Until Goal Tube Feed Rate (mL per Hour): 55  Tube Feed Duration (in Hours): 24  Tube Feed Start Time: 11:00  Entered: Jun 22 2021 12:55PM    
This patient has been assessed with a concern for Malnutrition and has been determined to have a diagnosis/diagnoses of Severe protein-calorie malnutrition.    This patient is being managed with:   Diet NPO with Tube Feed-  Tube Feeding Modality: Orogastric  Nepro with Carb Steady (NEPRORTH)  Total Volume for 24 Hours (mL): 1320  Continuous  Starting Tube Feed Rate {mL per Hour}: 40  Increase Tube Feed Rate by (mL): 15     Every 4 hours  Until Goal Tube Feed Rate (mL per Hour): 55  Tube Feed Duration (in Hours): 24  Tube Feed Start Time: 11:00  Entered: Jun 22 2021 12:55PM    
This patient has been assessed with a concern for Malnutrition and has been determined to have a diagnosis/diagnoses of Severe protein-calorie malnutrition.      
This patient has been assessed with a concern for Malnutrition and has been determined to have a diagnosis/diagnoses of Severe protein-calorie malnutrition.    This patient is being managed with:   Diet NPO-  Except Medications  Entered: Jun 18 2021  8:48AM    
This patient has been assessed with a concern for Malnutrition and has been determined to have a diagnosis/diagnoses of Severe protein-calorie malnutrition.    This patient is being managed with:   Diet Clear Liquid-  Entered: Jun 17 2021  2:23PM    
This patient has been assessed with a concern for Malnutrition and has been determined to have a diagnosis/diagnoses of Severe protein-calorie malnutrition.    This patient is being managed with:   Diet NPO with Tube Feed-  Tube Feeding Modality: Orogastric  Nepro with Carb Steady (NEPRORTH)  Total Volume for 24 Hours (mL): 960  Continuous  Starting Tube Feed Rate {mL per Hour}: 10  Increase Tube Feed Rate by (mL): 10     Every 4 hours  Until Goal Tube Feed Rate (mL per Hour): 40  Tube Feed Duration (in Hours): 24  Tube Feed Start Time: 11:00  Entered: Jun 21 2021 11:15AM

## 2021-06-25 NOTE — DISCHARGE NOTE FOR THE EXPIRED PATIENT - HOSPITAL COURSE
Mr. Burns is a 80y/o M with PMHx of thrombocytopenia, anemia, AAA, DVT s/p IVC filter, duodenal perforation 21 s/p amy patch plication and J-tube, H.pylori s/p triple therapy, DM, HTN, HLD, and severe dementia (AAOx1 at baseline) recent admission for GIB presenting again for repeat GIB. Pt first presented on 6/3/21 for SOB and AMS and was admitted to [place] from 6/3 to 6/15 to the ICU for symptomatic anemia and GI bleeding. EGD performed during that admission did not show any active bleeding nor blood in esophagus, stomach, or duodenum but did show a large deep ulcer in the anterior duodenal bulb and large flat ulcer over the posterior duodenal bulb without visible vessel. He also had a tagged RBC scan which was negative. He did not have any further episodes of bleeding and was discharged on 6/15 with a Hgb of 8.4. Pt returned later on the same day 6/15 with repeat GIB and was found to have a Hgb of 6.4. CTA A/P w/o active bleeding. Pt received 2u pRBC however Hgb did not improve (repeat 6.2). He was transfused an additional unit (3u pRBC total) and transferred to [facility] for further management.   Pt was admitted to the [facility] MICU  and received 2u pRBCs on . Pt underwent EGD showing numerous duodenal ulcers without bleeding on . On  pt required an additional 4u pRBCs during the day and had multiple melanotic stools. EDG/colonoscopy performed  with video capsule placement and saulo blood visualized throughout the colon. The evening of  pt developed worsening hemorrhagic shock requiring maximal dose of three vasopressors, massive transfusion protocol, and intubation for increased work of breathing in setting of metabolic acidosis.  capsule study was completed showing small bowel bleed.  Pt developed worsening renal function and pt was started on CRRT for acidosis/fluid overload. US LE showed R femoral DVT. Palliative was consulted and GOC. GOC continued. Family elected to pursue comfort care measures. On , the patient .

## 2021-06-25 NOTE — PROGRESS NOTE ADULT - SUBJECTIVE AND OBJECTIVE BOX
[facility] Division of Kidney Diseases & Hypertension  FOLLOW UP NOTE  [phone]--------------------------------------------------------------------------------    Chief Complaint: WALDO on CKD, Hemorrhagic shock    24 hour events/subjective:  Worsening hemodynamic instability overnight, requiring 3 pressors.  CRRT with no issues overnight.    PAST HISTORY  --------------------------------------------------------------------------------  No significant changes to PMH, PSH, FHx, SHx, unless otherwise noted    ALLERGIES & MEDICATIONS  --------------------------------------------------------------------------------  Allergies    No Known Allergies    Intolerances    Standing Inpatient Medications  chlorhexidine 0.12% Liquid 15 milliLiter(s) Oral Mucosa every 12 hours  chlorhexidine 4% Liquid 1 Application(s) Topical <User Schedule>  CRRT Treatment    <Continuous>  dexMEDEtomidine Infusion 0.5 MICROgram(s)/kG/Hr IV Continuous <Continuous>  dextrose 20%. 500 milliLiter(s) IV Continuous <Continuous>  ketamine Infusion. 0.25 mG/kG/Hr IV Continuous <Continuous>  norepinephrine Infusion 0.05 MICROgram(s)/kG/Min IV Continuous <Continuous>  pantoprazole  Injectable 40 milliGRAM(s) IV Push every 12 hours  phenylephrine    Infusion 0.6 MICROgram(s)/kG/Min IV Continuous <Continuous>  Phoxillum Filtration BK 4 / 2.5 5000 milliLiter(s) CRRT <Continuous>  PrismaSOL Filtration BGK 4 / 2.5 5000 milliLiter(s) CRRT <Continuous>  PrismaSOL Filtration BGK 4 / 2.5 5000 milliLiter(s) CRRT <Continuous>  vasopressin Infusion 0.04 Unit(s)/Min IV Continuous <Continuous>    REVIEW OF SYSTEMS  --------------------------------------------------------------------------------  Unable to obtain due to medical condition    VITALS/PHYSICAL EXAM  --------------------------------------------------------------------------------  T(C): 36.9 (06-25-21 @ 08:00), Max: 36.9 (06-25-21 @ 08:00)  HR: 74 (06-25-21 @ 08:55) (67 - 80)  BP: 92/54 (06-25-21 @ 08:00) (75/51 - 108/64)  RR: 20 (06-25-21 @ 08:15) (20 - 42)  SpO2: 91% (06-24-21 @ 23:34) (87% - 98%)  Wt(kg): --    06-24-21 @ 07:01  -  06-25-21 @ 07:00  --------------------------------------------------------  IN: 3429.8 mL / OUT: 4858 mL / NET: -1428.2 mL    Physical Exam:              Gen: Intubated  	HEENT: Intubated, ET tube +  	Pulm: Fair air entry  	CV: RRR, S1S2;  	Abd: +BS, soft, nontender/distended  	UE: Warm, + edema  	LE: Warm, + edema  	Psych: Unable to assess due to clinical condition   	Skin: Warm, without rashes  	Vascular access: Right IJ non-tunneled dialysis catheter    LABS/STUDIES  --------------------------------------------------------------------------------              7.9    26.61 >-----------<  30       [06-24-21 @ 23:31]              23.7     138  |  105  |  17  ----------------------------<  151      [06-24-21 @ 23:31]  4.6   |  12  |  1.44        Ca     6.9     [06-24-21 @ 23:31]      Mg     2.3     [06-24-21 @ 23:31]      Phos  4.3     [06-24-21 @ 23:31]    TPro  4.1  /  Alb  1.8  /  TBili  2.0  /  DBili  x   /  AST  1038  /  ALT  247  /  AlkPhos  183  [06-24-21 @ 23:31]    PT/INR: PT 22.6 , INR 1.94       [06-24-21 @ 23:31]  PTT: 41.3       [06-24-21 @ 23:31]    Creatinine Trend:  SCr 1.44 [06-24 @ 23:31]  SCr 1.40 [06-24 @ 18:02]  SCr 1.45 [06-24 @ 12:05]  SCr 1.49 [06-24 @ 05:55]  SCr 1.65 [06-24 @ 01:18]    Urinalysis - [06-18-21 @ 21:44]      Color Yellow / Appearance Slightly Turbid / SG 1.023 / pH 5.5      Gluc Negative / Ketone Negative  / Bili Negative / Urobili Negative       Blood Small / Protein 100 / Leuk Est Moderate / Nitrite Negative      RBC 7 / WBC 15 / Hyaline 11 / Gran  / Sq Epi  / Non Sq Epi 6 / Bacteria Negative    Urine Creatinine 55      [06-23-21 @ 07:16]  Urine Protein 142      [06-23-21 @ 07:16]

## 2021-06-25 NOTE — PROGRESS NOTE ADULT - PROBLEM SELECTOR PROBLEM 3
Proteinuria, unspecified type

## 2021-06-25 NOTE — CHART NOTE - NSCHARTNOTEFT_GEN_A_CORE
DEATH NOTE    Called to bedside to evaluate the patient for pulselessness.     On physical exam, patient did not respond to verbal or noxious stimuli.  No spontaneous respirations.  Absent heart and breath sounds.  Absent radial and carotid pulses.   Pupils are fixed and dilated, no corneal reflex.  EKG rhythm strip shows asystole.   Patient pronounced dead at 9:20 AM.  Attending notified.  Family denied autopsy.      Christian Arguello M.D.  Internal Medicine PGY-1  [phone] / [phone]

## 2021-06-25 NOTE — PROGRESS NOTE ADULT - ASSESSMENT
81M PMH thrombocytopenia, anemia, AAA, DVT s/p IVC filter, duodenal perforation 4/28/21 s/p amy patch plication and J-tube, H.pylori s/p triple therapy, DM, HTN, HLD, severe dementia (AAOx1 at baseline), CKD, recent admission 6/3-6/15 for GIB now admitted again for repeat GIB. Course complicated by hemorrhagic shock.     NEURO  - intubated/sedated with Ketamine and Precedex   - fent gtt d/c'd   - wean sedation as tolerated for CPAP trial  - Baseline advanced dementia, AAOx1    PULM  - Intubated for respiratory failure in setting of hemorrhagic shock and metabolic acidosis   - on minimal vent settings    CV  #hypotension  - c/w Levo for hemorrhagic shock  - weaned off harvey and vaso     GI  #GIB  - s/p perforated duodenal ulcer s/p amy patch plication with J tube 4/28/21  - CTA A/P 6/15 negative for active bleeding. Recent EGD and tagged RBC scan in early 6/2021 w/o source of bleeding but did note large deep ulcer in the anterior duodenal bulb and large flat ulcer over the posterior duodenal bulb without visible vessel.  - GI and surgery following  - continue PPI IV BID  - EGD showing numerous nonbleeding duodenal ulcers  - capsule study, w/ duodenitis, fresh blood seen in small intestine   - continuing to require multiple transfusions daily, intermittent melena      RENAL  #CKD  - SCr 2.5, at baseline  - CRRT started 6/20 for worsening renal function/anuria, now with some increase in UOP  - [facility] moved [facility] to [facility] 6/23   - pt repeatedly clotting filter; unable to heparinize filter 2/2 bleeding     ENDO  #DM  - ISS q6h, monitor FSG    ID  - empiric Vanc/Mariely (6/19- 6/21)  - CTX 6/20-6/22  - monitor off Abx  - cultures NGTD    HEME  # acute blood loss anemia 2/2 UGIB  - GIB plan as above  - s/p MTP 6/19 PM  - monitor CBC q6h  - plan as above    #DVT  - s/p IVC filter  - SCDs, no antithrombotics    #thrombocytopenia  - monitor plts, transfuse for plts<50 if bleeding    ETHICS  - full code

## 2021-06-25 NOTE — PROGRESS NOTE ADULT - SUBJECTIVE AND OBJECTIVE BOX
Christian Arguello M.D.  Internal Medicine PGY-1  [phone] / [phone]     Patient is a 81y old  Male who presents with a chief complaint of GIB (24 Jun 2021 08:51)      SUBJECTIVE / OVERNIGHT EVENTS:          MEDICATIONS  (STANDING):  chlorhexidine 0.12% Liquid 15 milliLiter(s) Oral Mucosa every 12 hours  chlorhexidine 4% Liquid 1 Application(s) Topical <User Schedule>  CRRT Treatment    <Continuous>  dexMEDEtomidine Infusion 0.5 MICROgram(s)/kG/Hr (11.3 mL/Hr) IV Continuous <Continuous>  dextrose 10%. 1000 milliLiter(s) (55 mL/Hr) IV Continuous <Continuous>  dextrose 20%. 500 milliLiter(s) (30 mL/Hr) IV Continuous <Continuous>  ketamine Infusion. 0.25 mG/kG/Hr (2.25 mL/Hr) IV Continuous <Continuous>  norepinephrine Infusion 0.05 MICROgram(s)/kG/Min (4.22 mL/Hr) IV Continuous <Continuous>  pantoprazole  Injectable 40 milliGRAM(s) IV Push every 12 hours  phenylephrine    Infusion 0.6 MICROgram(s)/kG/Min (20.3 mL/Hr) IV Continuous <Continuous>  Phoxillum Filtration BK 4 / 2.5 5000 milliLiter(s) (1200 mL/Hr) CRRT <Continuous>  PrismaSOL Filtration BGK 4 / 2.5 5000 milliLiter(s) (1000 mL/Hr) CRRT <Continuous>  PrismaSOL Filtration BGK 4 / 2.5 5000 milliLiter(s) (200 mL/Hr) CRRT <Continuous>  vasopressin Infusion 0.04 Unit(s)/Min (2.4 mL/Hr) IV Continuous <Continuous>    MEDICATIONS  (PRN):  sodium chloride 0.9% lock flush 10 milliLiter(s) IV Push every 1 hour PRN Pre/post blood products, medications, blood draw, and to maintain line patency      Vital Signs Last 24 Hrs  T(C): 35.3 (25 Jun 2021 07:00), Max: 36.7 (24 Jun 2021 12:00)  T(F): 95.5 (25 Jun 2021 07:00), Max: 98.1 (24 Jun 2021 12:00)  HR: 74 (25 Jun 2021 07:00) (67 - 80)  BP: 98/55 (25 Jun 2021 07:00) (75/51 - 108/64)  BP(mean): 70 (25 Jun 2021 07:00) (59 - 83)  RR: 22 (25 Jun 2021 07:00) (22 - 42)  SpO2: 91% (24 Jun 2021 23:34) (87% - 99%)      PHYSICAL EXAM  GENERAL: NAD, well-developed  HEAD:  Atraumatic, Normocephalic  EYES: EOMI, PERRLA, conjunctiva and sclera clear  NECK: Supple, No JVD  CHEST/LUNG: Clear to auscultation bilaterally; No wheeze  HEART: Regular rate and rhythm; No murmurs, rubs, or gallops  ABDOMEN: Soft, Nontender, Nondistended; Bowel sounds present  EXTREMITIES:  2+ Peripheral Pulses, No clubbing, cyanosis, or edema  PSYCH: AAOx3  SKIN: No rashes or lesions    CAPILLARY BLOOD GLUCOSE  90 (25 Jun 2021 05:00)  87 (25 Jun 2021 04:00)  91 (25 Jun 2021 03:00)  100 (25 Jun 2021 02:00)  101 (25 Jun 2021 01:00)  113 (25 Jun 2021 00:00)  158 (24 Jun 2021 23:15)  57 (24 Jun 2021 23:00)  88 (24 Jun 2021 22:00)  22 (24 Jun 2021 21:45)  129 (24 Jun 2021 18:00)  45 (24 Jun 2021 17:45)      POCT Blood Glucose.: 90 mg/dL (25 Jun 2021 04:58)  POCT Blood Glucose.: 87 mg/dL (25 Jun 2021 03:52)  POCT Blood Glucose.: 91 mg/dL (25 Jun 2021 02:59)  POCT Blood Glucose.: 100 mg/dL (25 Jun 2021 02:00)  POCT Blood Glucose.: 101 mg/dL (25 Jun 2021 00:57)  POCT Blood Glucose.: 113 mg/dL (25 Jun 2021 00:00)  POCT Blood Glucose.: 158 mg/dL (24 Jun 2021 23:16)  POCT Blood Glucose.: 57 mg/dL (24 Jun 2021 22:58)  POCT Blood Glucose.: 88 mg/dL (24 Jun 2021 22:02)  POCT Blood Glucose.: 22 mg/dL (24 Jun 2021 21:42)  POCT Blood Glucose.: 129 mg/dL (24 Jun 2021 17:59)  POCT Blood Glucose.: 45 mg/dL (24 Jun 2021 17:44)  POCT Blood Glucose.: 80 mg/dL (24 Jun 2021 11:58)    I&O's Summary    24 Jun 2021 07:01  -  25 Jun 2021 07:00  --------------------------------------------------------  IN: 3429.8 mL / OUT: 4858 mL / NET: -1428.2 mL        LABS:                        7.9    26.61 )-----------( 30       ( 24 Jun 2021 23:31 )             23.7     06-24    138  |  105  |  17  ----------------------------<  151<H>  4.6   |  12<L>  |  1.44<H>    Ca    6.9<L>      24 Jun 2021 23:31  Phos  4.3     06-24  Mg     2.3     06-24    TPro  4.1<L>  /  Alb  1.8<L>  /  TBili  2.0<H>  /  DBili  x   /  AST  1038<H>  /  ALT  247<H>  /  AlkPhos  183<H>  06-24    PT/INR - ( 24 Jun 2021 23:31 )   PT: 22.6 sec;   INR: 1.94 ratio         PTT - ( 24 Jun 2021 23:31 )  PTT:41.3 sec          RADIOLOGY & ADDITIONAL TESTS:     MICROBIOLOGY:    ANTIMICROBIALS:    CONSULTS:

## 2021-06-25 NOTE — PROGRESS NOTE ADULT - NSICDXPILOT_GEN_ALL_CORE
[place]

## 2021-06-25 NOTE — GOALS OF CARE CONVERSATION - ADVANCED CARE PLANNING - CONVERSATION DETAILS
Patient's daughter and wife were contacted by phone regarding patient's clinical deterioration. It was explained to them that he was hypoglycemic, acidotic, and had increasing pressor requirements necessitating the addition of a third pressor. It was explained to them that these findings represent the dying process. They understood that nothing that we could do would change the ultimate outcome and could cause excess suffering. The decision was made to focus on comfort measures and not to escalate care. He was made DNR, pressors were capped, and blood draws were stopped. Family's goal was to try to see him in person in the morning.

## 2021-06-25 NOTE — CHART NOTE - NSCHARTNOTESELECT_GEN_ALL_CORE
IR
Nutrition Services
Off Service Note
Palliative Care/Event Note
Contrast Consent
Event Note
GI/Event Note
IR follow up note/Event Note
NGT/Event Note
Nutrition Services
Palliative Care/Event Note

## 2021-06-25 NOTE — PROGRESS NOTE ADULT - PROVIDER SPECIALTY LIST ADULT
Gastroenterology
Nephrology
Gastroenterology
MICU
MICU
Gastroenterology
MICU
Surgery
MICU
Nephrology
MICU
Nephrology

## 2024-04-16 NOTE — PROGRESS NOTE ADULT - SUBJECTIVE AND OBJECTIVE BOX
GI Progress Note      Interval Events:   - s/p EGD/colonoscopy yesterday, EGD w/ clean based duodenal ulcers; colonoscopy w/ blood but no active bleeding. During procedure patient decompensated, intubated, escalating pressors, transfused. EGD then reperformed for placement of capsule endoscopy  - overnight no further transfusions, no bowel movements, pressors stable    Allergies:  No Known Allergies      Hospital Medications:  aluminum hydroxide/magnesium hydroxide/simethicone Suspension 30 milliLiter(s) Oral every 6 hours PRN  chlorhexidine 0.12% Liquid 15 milliLiter(s) Oral Mucosa every 12 hours  chlorhexidine 4% Liquid 1 Application(s) Topical <User Schedule>  fentaNYL   Infusion... 0.5 MICROgram(s)/kG/Hr IV Continuous <Continuous>  insulin lispro (ADMELOG) corrective regimen sliding scale   SubCutaneous every 6 hours  ketamine Infusion. 0.25 mG/kG/Hr IV Continuous <Continuous>  meropenem  IVPB      meropenem  IVPB 500 milliGRAM(s) IV Intermittent every 12 hours  norepinephrine Infusion 0.05 MICROgram(s)/kG/Min IV Continuous <Continuous>  pantoprazole Infusion 8 mG/Hr IV Continuous <Continuous>  phenylephrine    Infusion 2 MICROgram(s)/kG/Min IV Continuous <Continuous>  vasopressin Infusion 0.04 Unit(s)/Min IV Continuous <Continuous>        PHYSICAL EXAM:   Vital Signs:  Vital Signs Last 24 Hrs  T(C): 38 (2021 08:00), Max: 38 (2021 08:00)  T(F): 100.4 (2021 08:00), Max: 100.4 (2021 08:00)  HR: 67 (2021 10:30) (47 - 93)  BP: 104/58 (2021 10:30) (59/22 - 200/89)  BP(mean): 75 (2021 10:30) (32 - 140)  RR: 20 (2021 10:30) (16 - 48)  SpO2: 99% (2021 10:30) (61% - 100%)  Daily     Daily     GENERAL:  intubated, sedated  HEENT:  NCAT, no scleral icterus  CHEST: intubated  HEART:  RRR  ABDOMEN:  Soft, non-tender, non-distended, normoactive bowel sounds, no masses, no hepato-splenomegaly, no signs of chronic liver disease  EXTREMITIES:  No cyanosis, clubbing, or edema  SKIN:  No rash/erythema/ecchymoses/petechiae/wounds/abscess/warm/dry  NEURO:  intubated    LABS:                        9.3    14.16 )-----------( 66       ( 2021 08:11 )             28.4     Mean Cell Volume: 91.0 fl (21 @ 08:11)        146<H>  |  119<H>  |  60<H>  ----------------------------<  180<H>  4.5   |  13<L>  |  3.23<H>    Ca    7.3<L>      2021 08:11  Phos  6.0       Mg     2.0         TPro  4.6<L>  /  Alb  2.2<L>  /  TBili  0.4  /  DBili  x   /  AST  26  /  ALT  8<L>  /  AlkPhos  43      LIVER FUNCTIONS - ( 2021 08:11 )  Alb: 2.2 g/dL / Pro: 4.6 g/dL / ALK PHOS: 43 U/L / ALT: 8 U/L / AST: 26 U/L / GGT: x           PT/INR - ( 2021 00:10 )   PT: 15.7 sec;   INR: 1.33 ratio         PTT - ( 2021 00:10 )  PTT:37.1 sec  Urinalysis Basic - ( 2021 21:44 )    Color: Yellow / Appearance: Slightly Turbid / S.023 / pH: x  Gluc: x / Ketone: Negative  / Bili: Negative / Urobili: Negative   Blood: x / Protein: 100 / Nitrite: Negative   Leuk Esterase: Moderate / RBC: 7 /hpf / WBC 15 /HPF   Sq Epi: x / Non Sq Epi: 6 / Bacteria: Negative            Imaging:  EGD 21  Findings:       The examined esophagus was normal.       The entire examined stomach was normal.       Few non-bleeding cratered duodenal ulcers with a clean ulcer base (Ilan Class III) were        found in the first portion of the duodenum and in the second portion of the duodenum. There        was surround erythema and edema next to the ulcers without bleeding.       The exam of the duodenum was otherwise normal.       The second portion of the duodenum was normal. Using the endoscope, the video capsule        enteroscope was advanced into the first portion of the duodenum.                                                                                                        Impression:          - Normal esophagus.                       - Normal stomach.                       - Multiple non-bleeding duodenal ulcers with no stigmata of bleeding.                       - Normal second portion of the duodenum.                       - Successful completion of the Video Capsule Enteroscope placement.                       - No specimens collected.    Colonoscopy 21  Findings:       Hemorrhoids were found on perianal exam.       Red blood was found in the entire colon.       A large amount of red bloody stool was found in the entire colon, interfering with        visualization.       Multiple small and large-mouthed diverticula were found in the ascending colon. No clear        active bleeding was seen from these lesions, though exam limited by poor prep.       The exam was otherwise normal throughout the examined colon.                                                                                                        Impression:          - No specimens collected.           Continue Regimen: -ketoconazole 2 % topical cream Apply twice daily to affected areas on the face and around nose indefinitely for maintenance Detail Level: Zone Render In Strict Bullet Format?: No

## 2025-03-10 NOTE — DIETITIAN INITIAL EVALUATION ADULT. - NUTRITIONGOAL OUTCOME1
Called patient to discuss results, no answer. LVM to call back  Pt to meet > 80% of estimated nutrition needs